# Patient Record
Sex: FEMALE | Race: WHITE | Employment: OTHER | URBAN - METROPOLITAN AREA
[De-identification: names, ages, dates, MRNs, and addresses within clinical notes are randomized per-mention and may not be internally consistent; named-entity substitution may affect disease eponyms.]

---

## 2018-09-03 ENCOUNTER — APPOINTMENT (OUTPATIENT)
Dept: GENERAL RADIOLOGY | Age: 73
End: 2018-09-03
Payer: COMMERCIAL

## 2018-09-03 ENCOUNTER — HOSPITAL ENCOUNTER (EMERGENCY)
Age: 73
Discharge: HOME | End: 2018-09-04
Attending: EMERGENCY MEDICINE
Payer: COMMERCIAL

## 2018-09-03 ENCOUNTER — APPOINTMENT (OUTPATIENT)
Dept: CT IMAGING | Age: 73
End: 2018-09-03
Payer: COMMERCIAL
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
## 2018-09-03 DIAGNOSIS — S09.90XA CLOSED HEAD INJURY, INITIAL ENCOUNTER: Primary | ICD-10-CM

## 2018-09-03 LAB
ANION GAP SERPL CALCULATED.3IONS-SCNC: 14 MMOL/L (ref 7–16)
APTT: 42.2 SEC (ref 24.5–35.1)
BASOPHILS ABSOLUTE: 0.05 E9/L (ref 0–0.2)
BASOPHILS RELATIVE PERCENT: 0.7 % (ref 0–2)
BUN BLDV-MCNC: 28 MG/DL (ref 8–23)
CALCIUM SERPL-MCNC: 9.8 MG/DL (ref 8.6–10.2)
CHLORIDE BLD-SCNC: 105 MMOL/L (ref 98–107)
CO2: 22 MMOL/L (ref 22–29)
CREAT SERPL-MCNC: 1.3 MG/DL (ref 0.5–1)
EKG ATRIAL RATE: 375 BPM
EKG Q-T INTERVAL: 420 MS
EKG QRS DURATION: 98 MS
EKG QTC CALCULATION (BAZETT): 478 MS
EKG R AXIS: -21 DEGREES
EKG T AXIS: -6 DEGREES
EKG VENTRICULAR RATE: 78 BPM
EOSINOPHILS ABSOLUTE: 0.35 E9/L (ref 0.05–0.5)
EOSINOPHILS RELATIVE PERCENT: 5.1 % (ref 0–6)
GFR AFRICAN AMERICAN: 48
GFR NON-AFRICAN AMERICAN: 40 ML/MIN/1.73
GLUCOSE BLD-MCNC: 95 MG/DL (ref 74–109)
HCT VFR BLD CALC: 38.1 % (ref 34–48)
HEMOGLOBIN: 11.7 G/DL (ref 11.5–15.5)
IMMATURE GRANULOCYTES #: 0.02 E9/L
IMMATURE GRANULOCYTES %: 0.3 % (ref 0–5)
INR BLD: 1.4
LYMPHOCYTES ABSOLUTE: 1.5 E9/L (ref 1.5–4)
LYMPHOCYTES RELATIVE PERCENT: 22 % (ref 20–42)
MCH RBC QN AUTO: 26.4 PG (ref 26–35)
MCHC RBC AUTO-ENTMCNC: 30.7 % (ref 32–34.5)
MCV RBC AUTO: 86 FL (ref 80–99.9)
MONOCYTES ABSOLUTE: 0.56 E9/L (ref 0.1–0.95)
MONOCYTES RELATIVE PERCENT: 8.2 % (ref 2–12)
NEUTROPHILS ABSOLUTE: 4.34 E9/L (ref 1.8–7.3)
NEUTROPHILS RELATIVE PERCENT: 63.7 % (ref 43–80)
PDW BLD-RTO: 15.8 FL (ref 11.5–15)
PLATELET # BLD: 197 E9/L (ref 130–450)
PMV BLD AUTO: 11.9 FL (ref 7–12)
POTASSIUM SERPL-SCNC: 4.2 MMOL/L (ref 3.5–5)
PROTHROMBIN TIME: 15.6 SEC (ref 9.3–12.4)
RBC # BLD: 4.43 E12/L (ref 3.5–5.5)
SODIUM BLD-SCNC: 141 MMOL/L (ref 132–146)
TROPONIN: <0.01 NG/ML (ref 0–0.03)
WBC # BLD: 6.8 E9/L (ref 4.5–11.5)

## 2018-09-03 PROCEDURE — 72131 CT LUMBAR SPINE W/O DYE: CPT

## 2018-09-03 PROCEDURE — 6360000002 HC RX W HCPCS: Performed by: STUDENT IN AN ORGANIZED HEALTH CARE EDUCATION/TRAINING PROGRAM

## 2018-09-03 PROCEDURE — 70450 CT HEAD/BRAIN W/O DYE: CPT

## 2018-09-03 PROCEDURE — 72125 CT NECK SPINE W/O DYE: CPT

## 2018-09-03 PROCEDURE — 85610 PROTHROMBIN TIME: CPT

## 2018-09-03 PROCEDURE — 71045 X-RAY EXAM CHEST 1 VIEW: CPT

## 2018-09-03 PROCEDURE — 85025 COMPLETE CBC W/AUTO DIFF WBC: CPT

## 2018-09-03 PROCEDURE — 80048 BASIC METABOLIC PNL TOTAL CA: CPT

## 2018-09-03 PROCEDURE — 73502 X-RAY EXAM HIP UNI 2-3 VIEWS: CPT

## 2018-09-03 PROCEDURE — 72128 CT CHEST SPINE W/O DYE: CPT

## 2018-09-03 PROCEDURE — 36415 COLL VENOUS BLD VENIPUNCTURE: CPT

## 2018-09-03 PROCEDURE — 96374 THER/PROPH/DIAG INJ IV PUSH: CPT

## 2018-09-03 PROCEDURE — 84484 ASSAY OF TROPONIN QUANT: CPT

## 2018-09-03 PROCEDURE — 99284 EMERGENCY DEPT VISIT MOD MDM: CPT

## 2018-09-03 PROCEDURE — 85730 THROMBOPLASTIN TIME PARTIAL: CPT

## 2018-09-03 RX ORDER — MORPHINE SULFATE 4 MG/ML
6 INJECTION, SOLUTION INTRAMUSCULAR; INTRAVENOUS ONCE
Status: COMPLETED | OUTPATIENT
Start: 2018-09-03 | End: 2018-09-03

## 2018-09-03 RX ADMIN — MORPHINE SULFATE 6 MG: 4 INJECTION, SOLUTION INTRAMUSCULAR; INTRAVENOUS at 23:15

## 2018-09-03 ASSESSMENT — PAIN SCALES - GENERAL
PAINLEVEL_OUTOF10: 6
PAINLEVEL_OUTOF10: 6

## 2018-09-03 ASSESSMENT — ENCOUNTER SYMPTOMS
SHORTNESS OF BREATH: 0
VOMITING: 0
SORE THROAT: 0
ABDOMINAL DISTENTION: 0
NAUSEA: 0
EYE REDNESS: 0
EYE DISCHARGE: 0
DIARRHEA: 0
COUGH: 0
WHEEZING: 0
BACK PAIN: 1
EYE PAIN: 0
SINUS PRESSURE: 0

## 2018-09-03 ASSESSMENT — PAIN DESCRIPTION - DESCRIPTORS: DESCRIPTORS: ACHING

## 2018-09-03 ASSESSMENT — PAIN DESCRIPTION - LOCATION: LOCATION: BACK;HEAD

## 2018-09-03 ASSESSMENT — PAIN DESCRIPTION - ORIENTATION: ORIENTATION: MID

## 2018-09-03 ASSESSMENT — PAIN DESCRIPTION - PAIN TYPE: TYPE: ACUTE PAIN

## 2018-09-04 ENCOUNTER — APPOINTMENT (OUTPATIENT)
Dept: CT IMAGING | Age: 73
End: 2018-09-04
Payer: COMMERCIAL

## 2018-09-04 VITALS
BODY MASS INDEX: 36.29 KG/M2 | HEIGHT: 69 IN | WEIGHT: 245 LBS | TEMPERATURE: 97.9 F | DIASTOLIC BLOOD PRESSURE: 78 MMHG | HEART RATE: 88 BPM | SYSTOLIC BLOOD PRESSURE: 140 MMHG | OXYGEN SATURATION: 97 % | RESPIRATION RATE: 19 BRPM

## 2018-09-04 PROCEDURE — 96375 TX/PRO/DX INJ NEW DRUG ADDON: CPT

## 2018-09-04 PROCEDURE — 2500000003 HC RX 250 WO HCPCS: Performed by: STUDENT IN AN ORGANIZED HEALTH CARE EDUCATION/TRAINING PROGRAM

## 2018-09-04 PROCEDURE — 70450 CT HEAD/BRAIN W/O DYE: CPT

## 2018-09-04 RX ORDER — KETAMINE HYDROCHLORIDE 10 MG/ML
20 INJECTION, SOLUTION INTRAMUSCULAR; INTRAVENOUS ONCE
Status: COMPLETED | OUTPATIENT
Start: 2018-09-04 | End: 2018-09-04

## 2018-09-04 RX ORDER — FENTANYL CITRATE 50 UG/ML
50 INJECTION, SOLUTION INTRAMUSCULAR; INTRAVENOUS ONCE
Status: DISCONTINUED | OUTPATIENT
Start: 2018-09-04 | End: 2018-09-04

## 2018-09-04 RX ADMIN — KETAMINE HYDROCHLORIDE 20 MG: 10 INJECTION, SOLUTION INTRAMUSCULAR; INTRAVENOUS at 01:01

## 2018-09-04 ASSESSMENT — PAIN SCALES - GENERAL
PAINLEVEL_OUTOF10: 6
PAINLEVEL_OUTOF10: 6

## 2018-09-04 NOTE — ED PROVIDER NOTES
and are negative. Physical Exam   Constitutional: She is oriented to person, place, and time. Alert and oriented ×3. No shortness of breath. No acute distress. HENT:   Head: Not macrocephalic and not microcephalic. Head is without raccoon's eyes, without Reddy's sign, without abrasion, without contusion, without laceration, without right periorbital erythema and without left periorbital erythema. Hair is normal.   Mouth/Throat: Oropharynx is clear and moist. No oropharyngeal exudate, posterior oropharyngeal edema or posterior oropharyngeal erythema. Eyes: Pupils are equal, round, and reactive to light. Conjunctivae and EOM are normal.   Neck: Spinous process tenderness and muscular tenderness present. No neck rigidity. No edema, no erythema and normal range of motion present. Cervical collar applied. Cardiovascular: Normal rate, regular rhythm, normal heart sounds and intact distal pulses. Exam reveals no friction rub. No murmur heard. Pulmonary/Chest: Effort normal and breath sounds normal. No respiratory distress. She has no wheezes. She has no rales. She exhibits no tenderness. Abdominal: Soft. She exhibits no distension. There is no tenderness. There is no rebound and no guarding. Musculoskeletal:        Left hip: She exhibits decreased range of motion, decreased strength, tenderness and bony tenderness. She exhibits no swelling, no crepitus, no deformity and no laceration. Thoracic back: She exhibits decreased range of motion, tenderness and bony tenderness. She exhibits no swelling, no edema, no deformity, no laceration, no pain, no spasm and normal pulse. Lumbar back: She exhibits decreased range of motion, tenderness and bony tenderness. She exhibits no swelling, no edema, no deformity, no laceration, no pain, no spasm and normal pulse. Left forearm: She exhibits swelling. She exhibits no tenderness, no bony tenderness, no edema, no deformity and no laceration. mg/dL    BUN 28 (H) 8 - 23 mg/dL    CREATININE 1.3 (H) 0.5 - 1.0 mg/dL    GFR Non-African American 40 >=60 mL/min/1.73    GFR African American 48     Calcium 9.8 8.6 - 10.2 mg/dL   Troponin   Result Value Ref Range    Troponin <0.01 0.00 - 0.03 ng/mL   Protime-INR   Result Value Ref Range    Protime 15.6 (H) 9.3 - 12.4 sec    INR 1.4    APTT   Result Value Ref Range    aPTT 42.2 (H) 24.5 - 35.1 sec   EKG 12 Lead   Result Value Ref Range    Ventricular Rate 78 BPM    Atrial Rate 375 BPM    QRS Duration 98 ms    Q-T Interval 420 ms    QTc Calculation (Bazett) 478 ms    R Axis -21 degrees    T Axis -6 degrees       Radiology:  CT Head WO Contrast   Final Result     Normal head/brain CT. Stable head CT compared with September 3, 2018. This report has been electronically signed by Zoila Santos MD.      CT Lumbar Spine WO Contrast   Final Result     No acute findings. This report has been electronically signed by Zoila Santos MD.      CT Thoracic Spine WO Contrast   Final Result     No acute findings. This report has been electronically signed by Zoila Santos MD.      CT Cervical Spine WO Contrast   Final Result     No acute findings. This report has been electronically signed by Zoila Santos MD.      CT Head WO Contrast   Final Result     No acute intracranial findings. This report has been electronically signed by Zoila Santos MD.      XR CHEST 1 VW   Final Result   No acute cardiopulmonary process. XR HIP LEFT (2-3 VIEWS)   Final Result   No acute fractures or dislocations in the left hip.          ------------------------- NURSING NOTES AND VITALS REVIEWED ---------------------------  Date / Time Roomed:  9/3/2018  9:26 PM  ED Bed Assignment:  18B/18B-18    The nursing notes within the ED encounter and vital signs as below have been reviewed.    BP (!) 173/89   Pulse 81   Temp 97.9 °F (36.6 °C) (Temporal)   Resp 19   Ht 5' 9\" (1.753 m)   Wt 245 lb (111.1 kg)   SpO2

## 2018-09-08 ENCOUNTER — APPOINTMENT (OUTPATIENT)
Dept: GENERAL RADIOLOGY | Age: 73
End: 2018-09-08
Payer: COMMERCIAL

## 2018-09-08 ENCOUNTER — HOSPITAL ENCOUNTER (EMERGENCY)
Age: 73
Discharge: HOME | End: 2018-09-08
Payer: COMMERCIAL

## 2018-09-08 VITALS
HEIGHT: 69 IN | HEART RATE: 78 BPM | OXYGEN SATURATION: 98 % | BODY MASS INDEX: 37.03 KG/M2 | DIASTOLIC BLOOD PRESSURE: 69 MMHG | WEIGHT: 250 LBS | TEMPERATURE: 98 F | SYSTOLIC BLOOD PRESSURE: 146 MMHG | RESPIRATION RATE: 20 BRPM

## 2018-09-08 DIAGNOSIS — T07.XXXA MULTIPLE CONTUSIONS: Primary | ICD-10-CM
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
## 2018-09-08 PROCEDURE — 73060 X-RAY EXAM OF HUMERUS: CPT

## 2018-09-08 PROCEDURE — 99284 EMERGENCY DEPT VISIT MOD MDM: CPT

## 2018-09-08 PROCEDURE — 73562 X-RAY EXAM OF KNEE 3: CPT

## 2018-09-08 PROCEDURE — 73070 X-RAY EXAM OF ELBOW: CPT

## 2018-09-08 ASSESSMENT — PAIN SCALES - GENERAL: PAINLEVEL_OUTOF10: 5

## 2018-09-08 ASSESSMENT — PAIN DESCRIPTION - LOCATION: LOCATION: ARM;KNEE

## 2018-09-08 ASSESSMENT — PAIN DESCRIPTION - PAIN TYPE: TYPE: ACUTE PAIN

## 2018-09-08 NOTE — ED PROVIDER NOTES
1. Multiple contusions    2. Fall, initial encounter      Plan   Discharge to home  Patient condition is good    New Medications     Discharge Medication List as of 9/8/2018  6:41 PM        Electronically signed by SONIA Duncan CNP   DD: 9/8/18  **This report was transcribed using voice recognition software. Every effort was made to ensure accuracy; however, inadvertent computerized transcription errors may be present.   END OF ED PROVIDER NOTE       SOINA Macias CNP  09/08/18 [id number]

## 2019-01-02 ENCOUNTER — APPOINTMENT (OUTPATIENT)
Dept: GENERAL RADIOLOGY | Age: 74
End: 2019-01-02
Payer: COMMERCIAL

## 2019-01-02 ENCOUNTER — HOSPITAL ENCOUNTER (EMERGENCY)
Age: 74
Discharge: HOME | End: 2019-01-02
Payer: COMMERCIAL

## 2019-01-02 ENCOUNTER — APPOINTMENT (OUTPATIENT)
Dept: CT IMAGING | Age: 74
End: 2019-01-02
Payer: COMMERCIAL

## 2019-01-02 VITALS
RESPIRATION RATE: 16 BRPM | DIASTOLIC BLOOD PRESSURE: 64 MMHG | BODY MASS INDEX: 37.03 KG/M2 | SYSTOLIC BLOOD PRESSURE: 118 MMHG | HEART RATE: 68 BPM | WEIGHT: 250 LBS | HEIGHT: 69 IN | TEMPERATURE: 97.6 F | OXYGEN SATURATION: 100 %

## 2019-01-02 DIAGNOSIS — T07.XXXA MULTIPLE CONTUSIONS: ICD-10-CM

## 2019-01-02 DIAGNOSIS — S09.90XA INJURY OF HEAD, INITIAL ENCOUNTER: Primary | ICD-10-CM
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
## 2019-01-02 LAB
ALBUMIN SERPL-MCNC: 4.1 G/DL (ref 3.5–5.2)
ALP BLD-CCNC: 117 U/L (ref 35–104)
ALT SERPL-CCNC: 12 U/L (ref 0–32)
ANION GAP SERPL CALCULATED.3IONS-SCNC: 13 MMOL/L (ref 7–16)
AST SERPL-CCNC: 24 U/L (ref 0–31)
BACTERIA: ABNORMAL /HPF
BASOPHILS ABSOLUTE: 0.04 E9/L (ref 0–0.2)
BASOPHILS RELATIVE PERCENT: 0.5 % (ref 0–2)
BILIRUB SERPL-MCNC: 0.4 MG/DL (ref 0–1.2)
BILIRUBIN URINE: NEGATIVE
BLOOD, URINE: NEGATIVE
BUN BLDV-MCNC: 28 MG/DL (ref 8–23)
CALCIUM SERPL-MCNC: 9.4 MG/DL (ref 8.6–10.2)
CHLORIDE BLD-SCNC: 106 MMOL/L (ref 98–107)
CLARITY: CLEAR
CO2: 22 MMOL/L (ref 22–29)
CO2: 23 MMOL/L (ref 22–29)
COLOR: YELLOW
CREAT SERPL-MCNC: 1.3 MG/DL (ref 0.5–1)
EOSINOPHILS ABSOLUTE: 0.21 E9/L (ref 0.05–0.5)
EOSINOPHILS RELATIVE PERCENT: 2.9 % (ref 0–6)
EPITHELIAL CELLS, UA: ABNORMAL /HPF
GFR AFRICAN AMERICAN: 48
GFR AFRICAN AMERICAN: 48
GFR NON-AFRICAN AMERICAN: 40 ML/MIN/1.73
GFR NON-AFRICAN AMERICAN: 40 ML/MIN/1.73
GLUCOSE BLD-MCNC: 89 MG/DL (ref 74–99)
GLUCOSE BLD-MCNC: 91 MG/DL (ref 74–99)
GLUCOSE URINE: NEGATIVE MG/DL
HCT VFR BLD CALC: 37.8 % (ref 34–48)
HEMOGLOBIN: 11.8 G/DL (ref 11.5–15.5)
IMMATURE GRANULOCYTES #: 0.02 E9/L
IMMATURE GRANULOCYTES %: 0.3 % (ref 0–5)
KETONES, URINE: NEGATIVE MG/DL
LACTIC ACID: 1.1 MMOL/L (ref 0.5–2.2)
LEUKOCYTE ESTERASE, URINE: ABNORMAL
LYMPHOCYTES ABSOLUTE: 0.7 E9/L (ref 1.5–4)
LYMPHOCYTES RELATIVE PERCENT: 9.6 % (ref 20–42)
MCH RBC QN AUTO: 26.9 PG (ref 26–35)
MCHC RBC AUTO-ENTMCNC: 31.2 % (ref 32–34.5)
MCV RBC AUTO: 86.1 FL (ref 80–99.9)
MONOCYTES ABSOLUTE: 0.55 E9/L (ref 0.1–0.95)
MONOCYTES RELATIVE PERCENT: 7.5 % (ref 2–12)
NEUTROPHILS ABSOLUTE: 5.8 E9/L (ref 1.8–7.3)
NEUTROPHILS RELATIVE PERCENT: 79.2 % (ref 43–80)
NITRITE, URINE: NEGATIVE
PDW BLD-RTO: 14.3 FL (ref 11.5–15)
PH UA: 5.5 (ref 5–9)
PLATELET # BLD: 192 E9/L (ref 130–450)
PMV BLD AUTO: 11.3 FL (ref 7–12)
POC ANION GAP: 12 MMOL/L (ref 7–16)
POC BUN: 28 MG/DL (ref 8–23)
POC CHLORIDE: 108 MMOL/L (ref 100–108)
POC CREATININE: 1.3 MG/DL (ref 0.5–1)
POC POTASSIUM: 3.6 MMOL/L (ref 3.5–5)
POC SODIUM: 143 MMOL/L (ref 132–146)
POTASSIUM SERPL-SCNC: 3.8 MMOL/L (ref 3.5–5)
PROTEIN UA: ABNORMAL MG/DL
RBC # BLD: 4.39 E12/L (ref 3.5–5.5)
RBC UA: ABNORMAL /HPF (ref 0–2)
SODIUM BLD-SCNC: 141 MMOL/L (ref 132–146)
SPECIFIC GRAVITY UA: >=1.03 (ref 1–1.03)
TOTAL PROTEIN: 7.8 G/DL (ref 6.4–8.3)
UROBILINOGEN, URINE: 0.2 E.U./DL
WBC # BLD: 7.3 E9/L (ref 4.5–11.5)
WBC UA: ABNORMAL /HPF (ref 0–5)

## 2019-01-02 PROCEDURE — 6370000000 HC RX 637 (ALT 250 FOR IP): Performed by: PHYSICIAN ASSISTANT

## 2019-01-02 PROCEDURE — 99284 EMERGENCY DEPT VISIT MOD MDM: CPT

## 2019-01-02 PROCEDURE — 36415 COLL VENOUS BLD VENIPUNCTURE: CPT

## 2019-01-02 PROCEDURE — 74176 CT ABD & PELVIS W/O CONTRAST: CPT

## 2019-01-02 PROCEDURE — 84520 ASSAY OF UREA NITROGEN: CPT

## 2019-01-02 PROCEDURE — 70450 CT HEAD/BRAIN W/O DYE: CPT

## 2019-01-02 PROCEDURE — 73502 X-RAY EXAM HIP UNI 2-3 VIEWS: CPT

## 2019-01-02 PROCEDURE — 81001 URINALYSIS AUTO W/SCOPE: CPT

## 2019-01-02 PROCEDURE — 73030 X-RAY EXAM OF SHOULDER: CPT

## 2019-01-02 PROCEDURE — 80053 COMPREHEN METABOLIC PANEL: CPT

## 2019-01-02 PROCEDURE — 82947 ASSAY GLUCOSE BLOOD QUANT: CPT

## 2019-01-02 PROCEDURE — 73070 X-RAY EXAM OF ELBOW: CPT

## 2019-01-02 PROCEDURE — 85025 COMPLETE CBC W/AUTO DIFF WBC: CPT

## 2019-01-02 PROCEDURE — 72125 CT NECK SPINE W/O DYE: CPT

## 2019-01-02 PROCEDURE — 82565 ASSAY OF CREATININE: CPT

## 2019-01-02 PROCEDURE — 80051 ELECTROLYTE PANEL: CPT

## 2019-01-02 PROCEDURE — 83605 ASSAY OF LACTIC ACID: CPT

## 2019-01-02 RX ORDER — TRAMADOL HYDROCHLORIDE 50 MG/1
50 TABLET ORAL ONCE
Status: COMPLETED | OUTPATIENT
Start: 2019-01-02 | End: 2019-01-02

## 2019-01-02 RX ADMIN — TRAMADOL HYDROCHLORIDE 50 MG: 50 TABLET, FILM COATED ORAL at 16:39

## 2019-01-02 ASSESSMENT — PAIN SCALES - GENERAL
PAINLEVEL_OUTOF10: 8
PAINLEVEL_OUTOF10: 8

## 2019-01-02 ASSESSMENT — PAIN DESCRIPTION - ORIENTATION: ORIENTATION: LEFT

## 2019-01-02 ASSESSMENT — PAIN DESCRIPTION - LOCATION: LOCATION: SHOULDER;HIP

## 2019-01-02 ASSESSMENT — PAIN DESCRIPTION - PAIN TYPE: TYPE: ACUTE PAIN

## 2019-05-06 ENCOUNTER — HOSPITAL ENCOUNTER (OUTPATIENT)
Age: 74
Discharge: HOME | End: 2019-05-06
Payer: COMMERCIAL

## 2019-05-06 LAB
ALBUMIN SERPL-MCNC: 3.9 G/DL (ref 3.5–5.2)
ALP BLD-CCNC: 121 U/L (ref 35–104)
ALT SERPL-CCNC: 10 U/L (ref 0–32)
ANION GAP SERPL CALCULATED.3IONS-SCNC: 9 MMOL/L (ref 7–16)
AST SERPL-CCNC: 18 U/L (ref 0–31)
BILIRUB SERPL-MCNC: 0.4 MG/DL (ref 0–1.2)
BUN BLDV-MCNC: 19 MG/DL (ref 8–23)
CALCIUM SERPL-MCNC: 9.2 MG/DL (ref 8.6–10.2)
CHLORIDE BLD-SCNC: 107 MMOL/L (ref 98–107)
CO2: 27 MMOL/L (ref 22–29)
CREAT SERPL-MCNC: 1.2 MG/DL (ref 0.5–1)
CREATININE URINE: 122 MG/DL (ref 29–226)
GFR AFRICAN AMERICAN: 53
GFR NON-AFRICAN AMERICAN: 44 ML/MIN/1.73
GLUCOSE BLD-MCNC: 102 MG/DL (ref 74–99)
HCT VFR BLD CALC: 36.4 % (ref 34–48)
HEMOGLOBIN: 10.9 G/DL (ref 11.5–15.5)
MCH RBC QN AUTO: 26.5 PG (ref 26–35)
MCHC RBC AUTO-ENTMCNC: 29.9 % (ref 32–34.5)
MCV RBC AUTO: 88.3 FL (ref 80–99.9)
PARATHYROID HORMONE INTACT: 94 PG/ML (ref 15–65)
PDW BLD-RTO: 15.1 FL (ref 11.5–15)
PHOSPHORUS: 2.9 MG/DL (ref 2.5–4.5)
PLATELET # BLD: 185 E9/L (ref 130–450)
PMV BLD AUTO: 11.5 FL (ref 7–12)
POTASSIUM SERPL-SCNC: 4.4 MMOL/L (ref 3.5–5)
PROTEIN PROTEIN: 33 MG/DL (ref 0–12)
PROTEIN/CREAT RATIO: 0.3
PROTEIN/CREAT RATIO: 0.3 (ref 0–0.2)
RBC # BLD: 4.12 E12/L (ref 3.5–5.5)
SODIUM BLD-SCNC: 143 MMOL/L (ref 132–146)
TOTAL PROTEIN: 6.9 G/DL (ref 6.4–8.3)
VITAMIN D 25-HYDROXY: 25 NG/ML (ref 30–100)
WBC # BLD: 4.8 E9/L (ref 4.5–11.5)

## 2019-05-06 PROCEDURE — 85027 COMPLETE CBC AUTOMATED: CPT

## 2019-05-06 PROCEDURE — 84100 ASSAY OF PHOSPHORUS: CPT

## 2019-05-06 PROCEDURE — 83970 ASSAY OF PARATHORMONE: CPT

## 2019-05-06 PROCEDURE — 82306 VITAMIN D 25 HYDROXY: CPT

## 2019-05-06 PROCEDURE — 82570 ASSAY OF URINE CREATININE: CPT

## 2019-05-06 PROCEDURE — 80053 COMPREHEN METABOLIC PANEL: CPT

## 2019-05-06 PROCEDURE — 36415 COLL VENOUS BLD VENIPUNCTURE: CPT

## 2019-05-06 PROCEDURE — 84156 ASSAY OF PROTEIN URINE: CPT

## 2020-07-01 ENCOUNTER — HOSPITAL ENCOUNTER (OUTPATIENT)
Age: 75
Discharge: HOME | End: 2020-07-03
Payer: COMMERCIAL

## 2020-07-01 ENCOUNTER — HOSPITAL ENCOUNTER (OUTPATIENT)
Dept: CT IMAGING | Age: 75
Discharge: HOME | End: 2020-07-01
Payer: COMMERCIAL

## 2020-07-01 ENCOUNTER — HOSPITAL ENCOUNTER (OUTPATIENT)
Dept: GENERAL RADIOLOGY | Age: 75
Discharge: HOME | End: 2020-07-03
Payer: COMMERCIAL

## 2020-07-01 PROCEDURE — 70150 X-RAY EXAM OF FACIAL BONES: CPT

## 2020-07-01 PROCEDURE — 72050 X-RAY EXAM NECK SPINE 4/5VWS: CPT

## 2020-07-01 PROCEDURE — 70450 CT HEAD/BRAIN W/O DYE: CPT

## 2020-07-07 ENCOUNTER — HOSPITAL ENCOUNTER (EMERGENCY)
Age: 75
Discharge: HOME | End: 2020-07-07
Attending: STUDENT IN AN ORGANIZED HEALTH CARE EDUCATION/TRAINING PROGRAM
Payer: COMMERCIAL

## 2020-07-07 ENCOUNTER — APPOINTMENT (OUTPATIENT)
Dept: CT IMAGING | Age: 75
End: 2020-07-07
Payer: COMMERCIAL

## 2020-07-07 VITALS
SYSTOLIC BLOOD PRESSURE: 151 MMHG | RESPIRATION RATE: 16 BRPM | DIASTOLIC BLOOD PRESSURE: 98 MMHG | TEMPERATURE: 98.2 F | HEART RATE: 86 BPM | OXYGEN SATURATION: 94 %

## 2020-07-07 PROCEDURE — 99284 EMERGENCY DEPT VISIT MOD MDM: CPT

## 2020-07-07 PROCEDURE — 70450 CT HEAD/BRAIN W/O DYE: CPT

## 2020-07-07 PROCEDURE — 72125 CT NECK SPINE W/O DYE: CPT

## 2020-07-07 NOTE — ED PROVIDER NOTES
HPI:  7/7/20, Time: 3:35 PM EDT         Demi Bocanegra is a 67 y.o. female presenting to the ED for fall, beginning earlier today. Patient states that she was just walking in her home and she had a mechanical fall. Hit the left posterior portion of her head. She has a bump in this area. No loss of consciousness. Patient states that this is her second fall in a week. She was assessed earlier this week by her primary care physician who ordered imaging. She had no acute pathology at that time. Patient states that she is typically unsteady on her feet. States that she is on Xarelto for chronic A. fib. Denies any dizziness or lightheadedness or chest pain, shortness of breath or anything prior to this episode. States that this is a mechanical fall. Discussed possibility of rehab with patient. Patient denies any other injuries or any other symptoms. Patient denies fever/chills, cough, congestion, chest pain, shortness of breath, edema, headache, visual disturbances, focal paresthesias, focal weakness, abdominal pain, nausea, vomiting, diarrhea, constipation, dysuria, hematuria, neck or back pain or other complaints. ROS:   Pertinent positives and negatives are stated within HPI, all other systems reviewed and are negative.      --------------------------------------------- PAST HISTORY ---------------------------------------------  Past Medical History:  has a past medical history of Arthritis, Atrial fibrillation ([facility] [address] GERD (gastroesophageal reflux disease), History of blood transfusion, MI, old, Renal insufficiency, and Thyroid disease. Past Surgical History:  has a past surgical history that includes pacemaker placement; joint replacement; fracture surgery; Abdomen surgery; Gastric bypass surgery; Cholecystectomy; and Appendectomy. Social History:  reports that she has quit smoking. She has never used smokeless tobacco. She reports current alcohol use.  She reports that she does not use drugs. Family History: family history is not on file. The patients home medications have been reviewed. Allergies: Hydromorphone; Ciprofloxacin; Codeine; Dilaudid [hydromorphone hcl]; Hydrocodone-acetaminophen; Percodan [oxycodone-aspirin]; and Vicodin [hydrocodone-acetaminophen]        ---------------------------------------------------PHYSICAL EXAM--------------------------------------    Constitutional:  Well developed, well nourished, no acute distress, non-toxic appearance   Eyes:  PERRL, conjunctiva normal, EOMI  HENT: Swelling to posterior left side of head. Consistent with posterior parietal versus occipital region. No midline cervical tenderness. No reduction in range of motion. External ears normal, nose normal, oropharynx moist. Neck- normal range of motion, no tenderness, supple   Respiratory:  No respiratory distress, normal breath sounds, no rales, no wheezing   Cardiovascular:  Normal rate, normal rhythm, no murmurs, no gallops, no rubs. Radial and DP pulses 2+ bilaterally. GI:  Soft, nondistended, normal bowel sounds, nontender, no organomegaly, no mass, no rebound, no guarding   :  No costovertebral angle tenderness   Musculoskeletal: Multiple healing superficial abrasions on left elbow and right arm. Bandages in place. No tenderness. No reduction in range of motion. Integument:  Well hydrated, no rash. Adequate perfusion. Lymphatic:  No lymphadenopathy noted   Neurologic:  Alert & oriented x 3, CN 2-12 normal, normal motor function, normal sensory function, no focal deficits noted. Psychiatric:  Speech and behavior appropriate       -------------------------------------------------- RESULTS -------------------------------------------------  I have personally reviewed all laboratory and imaging results for this patient. Results are listed below. LABS:  No results found for this visit on 07/07/20.     RADIOLOGY:  Interpreted by Radiologist.  Ziyad Neumann Result   No acute intracranial abnormality. Specifically, there is no acute   intracranial hemorrhage. Atrophy and periventricular leukomalacia         CT Cervical Spine WO Contrast   Final Result   There is no acute fracture or subluxation of the cervical spine. Spondylosis               ------------------------- NURSING NOTES AND VITALS REVIEWED ---------------------------   The nursing notes within the ED encounter and vital signs as below have been reviewed by myself. BP (!) 151/98   Pulse 86   Temp 98.2 °F (36.8 °C) (Tympanic)   Resp 16   SpO2 94%   Oxygen Saturation Interpretation: Normal    The patients available past medical records and past encounters were reviewed. ------------------------------ ED COURSE/MEDICAL DECISION MAKING----------------------  Medications - No data to display    Medical Decision Making:    Discussed imaging results with patient. Patient's imaging is negative. This is fall occurred at about noon today. By the time the imaging was done she had multiple hours of observation with no symptoms. Denied any headache or visual changes. Imaging was negative for acute pathology. Patient is able to ambulate without any concern. Recommend that she discuss her fall concerns with her primary care provider and to return emergency department symptoms worsen. Patient agreed with this plan was discharged home. This patient's ED course included: a personal history and physicial examination, re-evaluation prior to disposition and a personal history and physicial eaxmination    This patient has remained hemodynamically stable during their ED course. Counseling: The emergency provider has spoken with the patient and discussed todays results, in addition to providing specific details for the plan of care and counseling regarding the diagnosis and prognosis. Questions are answered at this time and they are agreeable with the plan. --------------------------------- IMPRESSION AND DISPOSITION ---------------------------------    IMPRESSION  1. Injury of head, initial encounter    2.  Fall, initial encounter        DISPOSITION  Disposition: Discharge to home  Patient condition is stable                 Diana Chance DO  07/07/20 [id number]

## 2020-12-03 ENCOUNTER — OFFICE VISIT (OUTPATIENT)
Dept: PRIMARY CARE CLINIC | Age: 75
End: 2020-12-03
Payer: COMMERCIAL

## 2020-12-03 VITALS
OXYGEN SATURATION: 96 % | TEMPERATURE: 97.3 F | RESPIRATION RATE: 16 BRPM | BODY MASS INDEX: 39.25 KG/M2 | SYSTOLIC BLOOD PRESSURE: 122 MMHG | WEIGHT: 265 LBS | HEIGHT: 69 IN | DIASTOLIC BLOOD PRESSURE: 86 MMHG | HEART RATE: 68 BPM

## 2020-12-03 DIAGNOSIS — Z20.822 EXPOSURE TO COVID-19 VIRUS: ICD-10-CM

## 2020-12-03 PROCEDURE — G8417 CALC BMI ABV UP PARAM F/U: HCPCS | Performed by: PHYSICIAN ASSISTANT

## 2020-12-03 PROCEDURE — 1123F ACP DISCUSS/DSCN MKR DOCD: CPT | Performed by: PHYSICIAN ASSISTANT

## 2020-12-03 PROCEDURE — G8400 PT W/DXA NO RESULTS DOC: HCPCS | Performed by: PHYSICIAN ASSISTANT

## 2020-12-03 PROCEDURE — 99213 OFFICE O/P EST LOW 20 MIN: CPT | Performed by: PHYSICIAN ASSISTANT

## 2020-12-03 PROCEDURE — 1090F PRES/ABSN URINE INCON ASSESS: CPT | Performed by: PHYSICIAN ASSISTANT

## 2020-12-03 PROCEDURE — G8427 DOCREV CUR MEDS BY ELIG CLIN: HCPCS | Performed by: PHYSICIAN ASSISTANT

## 2020-12-03 PROCEDURE — G8484 FLU IMMUNIZE NO ADMIN: HCPCS | Performed by: PHYSICIAN ASSISTANT

## 2020-12-03 PROCEDURE — 1036F TOBACCO NON-USER: CPT | Performed by: PHYSICIAN ASSISTANT

## 2020-12-03 PROCEDURE — 4040F PNEUMOC VAC/ADMIN/RCVD: CPT | Performed by: PHYSICIAN ASSISTANT

## 2020-12-03 RX ORDER — CEPHALEXIN 250 MG/1
250 CAPSULE ORAL DAILY
COMMUNITY

## 2020-12-03 NOTE — PROGRESS NOTES
Chief Complaint   Headache (cough, scratchy throat, began 2 days ago fatigue, pos exposure)      History of Present Illness   Source of history provided by: patient. Vania Acosta is a 67 y.o. old female who has a past medical history of:   Past Medical History:   Diagnosis Date    Arthritis     Atrial fibrillation ([address]     GERD (gastroesophageal reflux disease)     History of blood transfusion     MI, old     Renal insufficiency     Thyroid disease     Presents to the flu clinic for evaluation of mild cough, fatigue and scratchy throat. She was exposed to a friend recently who a few days after told her they tested positive for COVID. She has had no known fever or chills. She always has soreness or pain , but she wanted to get checked just to be sure. She denies any CP, dyspnea, LE edema, abdominal pain, vomiting or rash. No history of international travel in the past 14 days. She has had contact with individuals with known COVID-19 infection or under investigation for COVID-19 infection. Denies any hx of asthma or COPD. No hx of tobacco use. ROS   Pertinent positives and negatives are stated within HPI, all other systems reviewed and are negative. Surgical History:  has a past surgical history that includes pacemaker placement; joint replacement; fracture surgery; Abdomen surgery; Gastric bypass surgery; Cholecystectomy; and Appendectomy. Social History:  reports that she has quit smoking. She has never used smokeless tobacco. She reports current alcohol use. She reports that she does not use drugs. Family History: family history is not on file. Allergies: Hydromorphone; Ciprofloxacin; Codeine; Dilaudid [hydromorphone hcl];  Hydrocodone-acetaminophen; Percodan [oxycodone-aspirin]; and Vicodin [hydrocodone-acetaminophen]    Physical Exam      VS:  /86 (Site: Left Upper Arm, Position: Standing, Cuff Size: Large Adult)   Pulse 68   Temp 97.3 °F (36.3 °C) (Temporal)   Resp 16   Ht 5' 9\" (1.753 m)   Wt 265 lb (120.2 kg)   SpO2 96%   Breastfeeding No   BMI 39.13 kg/m²    Oxygen Saturation Interpretation: Normal.    Constitutional:  Alert, development consistent with age. NAD. Non toxic in appearance. Head:  NC/NT. Airway patent. Ears: TMs normal bilaterally. Canals without exudate or swelling bilaterally. Mouth: Posterior pharynx with mild erythema and clear postnasal drip. No tonsillar hypertrophy or exudate. Mucosa moist  Neck:  Normal ROM. Supple. Some mild tenderness to  anterior cervical adenopathy noted. Lungs: CTAB without wheezes, rales, or rhonchi. CV:  Irregular rhythm(history of afib)  Skin:  Normal turgor. Warm, dry, without visible rash. Lymphatic: No lymphangitis or adenopathy noted. Neurological:  Oriented. Motor functions intact. Lab / Imaging Results   (All laboratory and radiology results have been personally reviewed by myself)  Labs:  No results found for this visit on 12/03/20. Imaging: All Radiology results interpreted by Radiologist unless otherwise noted. No results found. Medical Decision Making   Pt non-toxic, in no apparent distress and stable at time of discharge. Assessment/Plan   Cecilia Edwards was seen today for headache. Diagnoses and all orders for this visit:    Exposure to COVID-19 virus  -     COVID-19 Ambulatory; Future    Cough    Fatigue, unspecified type    Throat symptom        COVID-19 swab obtained and pending, will call with results once available. Advised cautionary self-quarantine at home in the interim. Increase fluids and rest. Symptomatic relief discussed including Tylenol prn pain/fever. Schedule virtual f/u with PCP in 7-10 days if symptoms persist. ED sooner if symptoms worsen or change. ED immediately with high or refractory fever, progressive SOB, dyspnea, CP, calf pain/swelling, shaking chills, vomiting, abdominal pain, lethargy, flank pain, or decreased urinary output.  Pt verbalizes understanding and is in agreement with plan of care. All questions answered. Ree Harry PA-C    This visit was provided as a focused evaluation during the COVID -19 pandemic/national emergency. A comprehensive review of all previous patient history and testing was not conducted. Pertinent findings were elicited during the visit.

## 2020-12-04 NOTE — PATIENT INSTRUCTIONS
Patient Education        Cough: Care Instructions  Your Care Instructions     A cough is your body's response to something that bothers your throat or airways. Many things can cause a cough. You might cough because of a cold or the flu, bronchitis, or asthma. Smoking, postnasal drip, allergies, and stomach acid that backs up into your throat also can cause coughs. A cough is a symptom, not a disease. Most coughs stop when the cause, such as a cold, goes away. You can take a few steps at home to cough less and feel better. Follow-up care is a key part of your treatment and safety. Be sure to make and go to all appointments, and call your doctor if you are having problems. It's also a good idea to know your test results and keep a list of the medicines you take. How can you care for yourself at home? · Drink lots of water and other fluids. This helps thin the mucus and soothes a dry or sore throat. Honey or lemon juice in hot water or tea may ease a dry cough. · Take cough medicine as directed by your doctor. · Prop up your head on pillows to help you breathe and ease a dry cough. · Try cough drops to soothe a dry or sore throat. Cough drops don't stop a cough. Medicine-flavored cough drops are no better than candy-flavored drops or hard candy. · Do not smoke. Avoid secondhand smoke. If you need help quitting, talk to your doctor about stop-smoking programs and medicines. These can increase your chances of quitting for good. When should you call for help? Call 911 anytime you think you may need emergency care. For example, call if:    · You have severe trouble breathing. Call your doctor now or seek immediate medical care if:    · You cough up blood.     · You have new or worse trouble breathing.     · You have a new or higher fever.     · You have a new rash.    Watch closely for changes in your health, and be sure to contact your doctor if:    · You cough more deeply or more often, especially if you notice more mucus or a change in the color of your mucus.     · You have new symptoms, such as a sore throat, an earache, or sinus pain.     · You do not get better as expected. Where can you learn more? Go to https://chpezeus.[employer]. org and sign in to your [employer] account. Enter D279 in the [facility] box to learn more about \"Cough: Care Instructions. \"     If you do not have an account, please click on the \"Sign Up Now\" link. Current as of: February 24, 2020               Content Version: 12.6  © 8026-1263 [employer]. Care instructions adapted under license by [address] If you have questions about a medical condition or this instruction, always ask your healthcare professional. [address] any warranty or liability for your use of this information. Patient Education        Sore Throat: Care Instructions  Your Care Instructions     Infection by bacteria or a virus causes most sore throats. Cigarette smoke, dry air, air pollution, allergies, and yelling can also cause a sore throat. Sore throats can be painful and annoying. Fortunately, most sore throats go away on their own. If you have a bacterial infection, your doctor may prescribe antibiotics. Follow-up care is a key part of your treatment and safety. Be sure to make and go to all appointments, and call your doctor if you are having problems. It's also a good idea to know your test results and keep a list of the medicines you take. How can you care for yourself at home? · If your doctor prescribed antibiotics, take them as directed. Do not stop taking them just because you feel better. You need to take the full course of antibiotics. · Gargle with warm salt water once an hour to help reduce swelling and relieve discomfort. Use 1 teaspoon of salt mixed in 1 cup of warm water.   · Take an over-the-counter pain medicine, such as acetaminophen (Tylenol), ibuprofen (Advil, Motrin), information. Patient Education        10 Things to Do When You Have COVID-19    Stay home. Don't go to school, work, or public areas. And don't use public transportation, ride-shares, or taxis unless you have no choice. Leave your home only if you need to get medical care. But call the doctor's office first so they know you're coming. And wear a cloth face cover. Ask before leaving isolation. Talk with your doctor or other health professional about when it will be safe for you to leave isolation. Wear a cloth face cover when you are around other people. It can help stop the spread of the virus when you cough or sneeze. Limit contact with people in your home. If possible, stay in a separate bedroom and use a separate bathroom. Avoid contact with pets and other animals. If possible, have a friend or family member care for them while you're sick. Cover your mouth and nose with a tissue when you cough or sneeze. Then throw the tissue in the trash right away. Wash your hands often, especially after you cough or sneeze. Use soap and water, and scrub for at least 20 seconds. If soap and water aren't available, use an alcohol-based hand . Don't share personal household items. These include bedding, towels, cups and glasses, and eating utensils. Clean and disinfect your home every day. Use household  or disinfectant wipes or sprays. Take special care to clean things that you grab with your hands. These include doorknobs, remote controls, phones, and handles on your refrigerator and microwave. And don't forget countertops, tabletops, bathrooms, and computer keyboards. Take acetaminophen (Tylenol) to relieve fever and body aches. Read and follow all instructions on the label. Current as of: July 10, 2020               Content Version: 12.6  © 2426-4189 [employer]. Care instructions adapted under license by [facility] ([facility]).  If you have questions about a medical condition or this instruction, always ask your healthcare professional. [place] [address] any warranty or liability for your use of this information.

## 2020-12-05 LAB
SARS-COV-2: NOT DETECTED
SOURCE: NORMAL

## 2021-01-02 ENCOUNTER — APPOINTMENT (OUTPATIENT)
Dept: GENERAL RADIOLOGY | Age: 76
End: 2021-01-02
Payer: COMMERCIAL

## 2021-01-02 ENCOUNTER — APPOINTMENT (OUTPATIENT)
Dept: CT IMAGING | Age: 76
End: 2021-01-02
Payer: COMMERCIAL

## 2021-01-02 ENCOUNTER — HOSPITAL ENCOUNTER (EMERGENCY)
Age: 76
Discharge: ANOTHER HOSPITAL | End: 2021-01-03
Attending: EMERGENCY MEDICINE
Payer: COMMERCIAL

## 2021-01-02 DIAGNOSIS — S72.031A: Primary | ICD-10-CM

## 2021-01-02 LAB
ANION GAP SERPL CALCULATED.3IONS-SCNC: 8 MMOL/L (ref 7–16)
APTT: 35.2 SEC (ref 24.5–35.1)
BUN BLDV-MCNC: 21 MG/DL (ref 8–23)
CALCIUM SERPL-MCNC: 9.3 MG/DL (ref 8.6–10.2)
CHLORIDE BLD-SCNC: 106 MMOL/L (ref 98–107)
CO2: 24 MMOL/L (ref 22–29)
CREAT SERPL-MCNC: 1.3 MG/DL (ref 0.5–1)
GFR AFRICAN AMERICAN: 48
GFR NON-AFRICAN AMERICAN: 40 ML/MIN/1.73
GLUCOSE BLD-MCNC: 148 MG/DL (ref 74–99)
INR BLD: 1.4
POTASSIUM SERPL-SCNC: 5.4 MMOL/L (ref 3.5–5)
PROTHROMBIN TIME: 15.7 SEC (ref 9.3–12.4)
SARS-COV-2, NAAT: NOT DETECTED
SODIUM BLD-SCNC: 138 MMOL/L (ref 132–146)

## 2021-01-02 PROCEDURE — 96376 TX/PRO/DX INJ SAME DRUG ADON: CPT

## 2021-01-02 PROCEDURE — 85610 PROTHROMBIN TIME: CPT

## 2021-01-02 PROCEDURE — U0002 COVID-19 LAB TEST NON-CDC: HCPCS

## 2021-01-02 PROCEDURE — 85730 THROMBOPLASTIN TIME PARTIAL: CPT

## 2021-01-02 PROCEDURE — 6360000002 HC RX W HCPCS

## 2021-01-02 PROCEDURE — 85025 COMPLETE CBC W/AUTO DIFF WBC: CPT

## 2021-01-02 PROCEDURE — 6360000002 HC RX W HCPCS: Performed by: EMERGENCY MEDICINE

## 2021-01-02 PROCEDURE — 80048 BASIC METABOLIC PNL TOTAL CA: CPT

## 2021-01-02 PROCEDURE — 96374 THER/PROPH/DIAG INJ IV PUSH: CPT

## 2021-01-02 PROCEDURE — 73700 CT LOWER EXTREMITY W/O DYE: CPT

## 2021-01-02 PROCEDURE — 73502 X-RAY EXAM HIP UNI 2-3 VIEWS: CPT

## 2021-01-02 PROCEDURE — 99285 EMERGENCY DEPT VISIT HI MDM: CPT

## 2021-01-02 RX ORDER — MORPHINE SULFATE 4 MG/ML
4 INJECTION, SOLUTION INTRAMUSCULAR; INTRAVENOUS ONCE
Status: COMPLETED | OUTPATIENT
Start: 2021-01-02 | End: 2021-01-02

## 2021-01-02 RX ORDER — MORPHINE SULFATE 4 MG/ML
INJECTION, SOLUTION INTRAMUSCULAR; INTRAVENOUS
Status: COMPLETED
Start: 2021-01-02 | End: 2021-01-02

## 2021-01-02 RX ORDER — MORPHINE SULFATE 4 MG/ML
INJECTION, SOLUTION INTRAMUSCULAR; INTRAVENOUS
Status: DISCONTINUED
Start: 2021-01-02 | End: 2021-01-03 | Stop reason: HOSPADM

## 2021-01-02 RX ORDER — MORPHINE SULFATE 10 MG/ML
8 INJECTION, SOLUTION INTRAMUSCULAR; INTRAVENOUS ONCE
Status: COMPLETED | OUTPATIENT
Start: 2021-01-02 | End: 2021-01-02

## 2021-01-02 RX ORDER — MORPHINE SULFATE 4 MG/ML
4 INJECTION, SOLUTION INTRAMUSCULAR; INTRAVENOUS ONCE
Status: DISCONTINUED | OUTPATIENT
Start: 2021-01-02 | End: 2021-01-02

## 2021-01-02 RX ADMIN — MORPHINE SULFATE 4 MG: 4 INJECTION, SOLUTION INTRAMUSCULAR; INTRAVENOUS at 22:06

## 2021-01-02 RX ADMIN — MORPHINE SULFATE 8 MG: 10 INJECTION, SOLUTION INTRAMUSCULAR; INTRAVENOUS at 22:07

## 2021-01-02 RX ADMIN — MORPHINE SULFATE 4 MG: 4 INJECTION, SOLUTION INTRAMUSCULAR; INTRAVENOUS at 20:06

## 2021-01-02 ASSESSMENT — ENCOUNTER SYMPTOMS
NAUSEA: 0
DIARRHEA: 0
WHEEZING: 0
RHINORRHEA: 0
CONSTIPATION: 0
PHOTOPHOBIA: 0
SINUS PAIN: 0
ABDOMINAL PAIN: 0
SHORTNESS OF BREATH: 0
VOMITING: 0
SINUS PRESSURE: 0
COUGH: 0
SORE THROAT: 0

## 2021-01-02 ASSESSMENT — PAIN DESCRIPTION - DESCRIPTORS: DESCRIPTORS: SHARP;SHOOTING;STABBING;TENDER

## 2021-01-02 ASSESSMENT — PAIN SCALES - GENERAL
PAINLEVEL_OUTOF10: 7
PAINLEVEL_OUTOF10: 10
PAINLEVEL_OUTOF10: 9

## 2021-01-02 ASSESSMENT — PAIN DESCRIPTION - LOCATION: LOCATION: HIP

## 2021-01-02 ASSESSMENT — PAIN DESCRIPTION - ORIENTATION: ORIENTATION: RIGHT

## 2021-01-02 ASSESSMENT — PAIN DESCRIPTION - PAIN TYPE: TYPE: ACUTE PAIN

## 2021-01-03 VITALS
OXYGEN SATURATION: 94 % | DIASTOLIC BLOOD PRESSURE: 84 MMHG | HEART RATE: 74 BPM | BODY MASS INDEX: 41.2 KG/M2 | RESPIRATION RATE: 19 BRPM | SYSTOLIC BLOOD PRESSURE: 160 MMHG | WEIGHT: 279 LBS | TEMPERATURE: 97.1 F

## 2021-01-03 LAB
BASOPHILS ABSOLUTE: 0 E9/L (ref 0–0.2)
BASOPHILS RELATIVE PERCENT: 0.2 % (ref 0–2)
BURR CELLS: ABNORMAL
EOSINOPHILS ABSOLUTE: 0.09 E9/L (ref 0.05–0.5)
EOSINOPHILS RELATIVE PERCENT: 0.9 % (ref 0–6)
HCT VFR BLD CALC: 38 % (ref 34–48)
HEMOGLOBIN: 12 G/DL (ref 11.5–15.5)
LYMPHOCYTES ABSOLUTE: 0.61 E9/L (ref 1.5–4)
LYMPHOCYTES RELATIVE PERCENT: 6.2 % (ref 20–42)
MCH RBC QN AUTO: 28.5 PG (ref 26–35)
MCHC RBC AUTO-ENTMCNC: 31.6 % (ref 32–34.5)
MCV RBC AUTO: 90.3 FL (ref 80–99.9)
MONOCYTES ABSOLUTE: 0.41 E9/L (ref 0.1–0.95)
MONOCYTES RELATIVE PERCENT: 4.4 % (ref 2–12)
NEUTROPHILS ABSOLUTE: 9.08 E9/L (ref 1.8–7.3)
NEUTROPHILS RELATIVE PERCENT: 88.5 % (ref 43–80)
OVALOCYTES: ABNORMAL
PDW BLD-RTO: 14 FL (ref 11.5–15)
PLATELET # BLD: 161 E9/L (ref 130–450)
PMV BLD AUTO: 11.3 FL (ref 7–12)
POIKILOCYTES: ABNORMAL
POLYCHROMASIA: ABNORMAL
RBC # BLD: 4.21 E12/L (ref 3.5–5.5)
WBC # BLD: 10.2 E9/L (ref 4.5–11.5)

## 2021-01-03 PROCEDURE — 96376 TX/PRO/DX INJ SAME DRUG ADON: CPT

## 2021-01-03 PROCEDURE — 6360000002 HC RX W HCPCS: Performed by: EMERGENCY MEDICINE

## 2021-01-03 RX ORDER — MORPHINE SULFATE 5 MG/ML
5 INJECTION, SOLUTION INTRAMUSCULAR; INTRAVENOUS ONCE
Status: COMPLETED | OUTPATIENT
Start: 2021-01-03 | End: 2021-01-03

## 2021-01-03 RX ADMIN — MORPHINE SULFATE 5 MG: 5 INJECTION, SOLUTION INTRAMUSCULAR; INTRAVENOUS at 01:10

## 2021-01-03 ASSESSMENT — PAIN SCALES - GENERAL: PAINLEVEL_OUTOF10: 10

## 2021-01-03 ASSESSMENT — PAIN DESCRIPTION - LOCATION: LOCATION: HIP

## 2021-01-03 NOTE — ED NOTES
Bed: 07  Expected date: 1/2/21  Expected time: 6:59 PM  Means of arrival: [facility]  Comments:     Emma Hodge RN  01/02/21 [id number]

## 2021-01-03 NOTE — ED PROVIDER NOTES
Patient is a 65-year-old female who arrives via EMS following a fall. Patient complains of right hip pain. States that she was in leaving after eating dinner and going down steps outside when she missed a step and fell onto her right side. Patient landed on her right hip and has pain. She has been unable to ambulate since the fall. She did not hit her head or lose consciousness. She is on Xarelto for history of A. fib and she has a pacemaker. Patient has had several orthopedic surgeries in the past and they have all been done at [facility]. Patient was given fentanyl in route. Patient denies any lightheadedness or dizziness, chest pain, shortness of breath, abdominal pain, nausea, vomiting. She denies any new numbness or tingling the extremity, she does of a known history of neuropathy from diabetes. The history is provided by the patient. No  was used. Review of Systems   Constitutional: Negative for chills, fatigue and fever. HENT: Negative for congestion, rhinorrhea, sinus pressure, sinus pain, sneezing and sore throat. Eyes: Negative for photophobia and visual disturbance. Respiratory: Negative for cough, shortness of breath and wheezing. Cardiovascular: Negative for chest pain and palpitations. Gastrointestinal: Negative for abdominal pain, constipation, diarrhea, nausea and vomiting. Genitourinary: Negative for dysuria, frequency and hematuria. Musculoskeletal: Positive for arthralgias (right hip pain). Negative for neck pain and neck stiffness. Skin: Negative for rash and wound. Neurological: Negative for dizziness, light-headedness and headaches. Psychiatric/Behavioral: Negative for agitation, behavioral problems and confusion. Physical Exam  Constitutional:       General: She is not in acute distress. Appearance: She is not toxic-appearing. HENT:      Head: Normocephalic.       Mouth/Throat:      Mouth: Mucous membranes this visit on 01/02/21. Radiology  CT HIP RIGHT WO CONTRAST   Final Result   1. Acute transcervical right femoral neck fracture. 2. Mild-to-moderate degenerative changes of the bilateral hips. 3. Chondrocalcinosis. XR HIP RIGHT (2-3 VIEWS)   Final Result   Subtle step-off along inferior aspect of right femoral neck could indicate   fracture. CT could be helpful for further evaluation.        ------------------------- NURSING NOTES AND VITALS REVIEWED ---------------------------  Date / Time Roomed:  1/2/2021  7:13 PM  ED Bed Assignment:  07/07    The nursing notes within the ED encounter and vital signs as below have been reviewed. Patient Vitals for the past 24 hrs:   BP Temp Temp src Pulse Resp SpO2 Weight   01/02/21 2212 (!) 145/99 97 °F (36.1 °C) Oral 91 18 94 % --   01/02/21 1913 (!) 173/95 97.7 °F (36.5 °C) Axillary 83 18 93 % 279 lb (126.6 kg)       Oxygen Saturation Interpretation: Normal      ------------------------------------------ PROGRESS NOTES ------------------------------------------  Re-evaluation(s):  Please see ED course. I have spoken with the patient and discussed todays results, in addition to providing specific details for the plan of care and counseling regarding the diagnosis and prognosis. Their questions are answered at this time and they are agreeable with the plan. I have discussed the risks and benefits of transfer and they wish to proceed with the transfer. --------------------------------- ADDITIONAL PROVIDER NOTES ---------------------------------  Consultations:  Time: 2219. Spoke with , orthopedics. Discussed case. He states that due to their hospital protocol internal medicine has to admit the patient due to her age. Otherwise he will provide consultation. Time: 2228. Spoke with , internal medicine. Discussed case, she will accept the patient as a transfer.   Once they receive bed assignment then the patient will be transferred over for possible surgery tomorrow morning. Reason for transfer: Orthopedic surgery, continuity of care. This patient's ED course included: a personal history and physicial examination, re-evaluation prior to disposition, IV medications, cardiac monitoring and continuous pulse oximetry    This patient has remained hemodynamically stable during their ED course. Please note that the withdrawal or failure to initiate urgent interventions for this patient would likely result in a life threatening deterioration or permanent disability. Accordingly this patient received 0 minutes of critical care time, excluding separately billable procedures. Clinical Impression  1. Closed transcervical fracture of right femur, initial encounter ([facility] [address]          Disposition  Patient's disposition: Transfer to [address] [place] [address] Transferred by: Ambulance. Patient's condition is stable.             Scarlett Pressley DO  01/02/21 2233

## 2021-03-02 ENCOUNTER — HOSPITAL ENCOUNTER (EMERGENCY)
Age: 76
Discharge: HOME | End: 2021-03-02
Attending: EMERGENCY MEDICINE
Payer: COMMERCIAL

## 2021-03-02 ENCOUNTER — APPOINTMENT (OUTPATIENT)
Dept: CT IMAGING | Age: 76
End: 2021-03-02
Payer: COMMERCIAL

## 2021-03-02 VITALS
TEMPERATURE: 98.2 F | OXYGEN SATURATION: 96 % | BODY MASS INDEX: 37.33 KG/M2 | HEART RATE: 85 BPM | DIASTOLIC BLOOD PRESSURE: 84 MMHG | HEIGHT: 69 IN | WEIGHT: 252 LBS | SYSTOLIC BLOOD PRESSURE: 141 MMHG | RESPIRATION RATE: 18 BRPM

## 2021-03-02 DIAGNOSIS — M25.551 RIGHT HIP PAIN: Primary | ICD-10-CM

## 2021-03-02 LAB
ALBUMIN SERPL-MCNC: 3.9 G/DL (ref 3.5–5.2)
ALP BLD-CCNC: 108 U/L (ref 35–104)
ALT SERPL-CCNC: 10 U/L (ref 0–32)
ANION GAP SERPL CALCULATED.3IONS-SCNC: 11 MMOL/L (ref 7–16)
ANISOCYTOSIS: ABNORMAL
AST SERPL-CCNC: 27 U/L (ref 0–31)
BACTERIA: ABNORMAL /HPF
BASOPHILS ABSOLUTE: 0 E9/L (ref 0–0.2)
BASOPHILS RELATIVE PERCENT: 0.3 % (ref 0–2)
BILIRUB SERPL-MCNC: 0.4 MG/DL (ref 0–1.2)
BILIRUBIN URINE: NEGATIVE
BLOOD, URINE: NEGATIVE
BUN BLDV-MCNC: 24 MG/DL (ref 8–23)
CALCIUM SERPL-MCNC: 9.8 MG/DL (ref 8.6–10.2)
CHLORIDE BLD-SCNC: 101 MMOL/L (ref 98–107)
CLARITY: ABNORMAL
CO2: 24 MMOL/L (ref 22–29)
COLOR: YELLOW
CREAT SERPL-MCNC: 1.3 MG/DL (ref 0.5–1)
EOSINOPHILS ABSOLUTE: 0 E9/L (ref 0.05–0.5)
EOSINOPHILS RELATIVE PERCENT: 0.1 % (ref 0–6)
EPITHELIAL CELLS, UA: ABNORMAL /HPF
GFR AFRICAN AMERICAN: 48
GFR NON-AFRICAN AMERICAN: 40 ML/MIN/1.73
GLUCOSE BLD-MCNC: 122 MG/DL (ref 74–99)
GLUCOSE URINE: NEGATIVE MG/DL
HCT VFR BLD CALC: 37.8 % (ref 34–48)
HEMOGLOBIN: 11.9 G/DL (ref 11.5–15.5)
HYPOCHROMIA: ABNORMAL
KETONES, URINE: NEGATIVE MG/DL
LACTIC ACID, SEPSIS: 1.6 MMOL/L (ref 0.5–1.9)
LEUKOCYTE ESTERASE, URINE: ABNORMAL
LYMPHOCYTES ABSOLUTE: 0.14 E9/L (ref 1.5–4)
LYMPHOCYTES RELATIVE PERCENT: 1.8 % (ref 20–42)
MCH RBC QN AUTO: 27.4 PG (ref 26–35)
MCHC RBC AUTO-ENTMCNC: 31.5 % (ref 32–34.5)
MCV RBC AUTO: 86.9 FL (ref 80–99.9)
MONOCYTES ABSOLUTE: 0 E9/L (ref 0.1–0.95)
MONOCYTES RELATIVE PERCENT: 3.1 % (ref 2–12)
NEUTROPHILS ABSOLUTE: 6.66 E9/L (ref 1.8–7.3)
NEUTROPHILS RELATIVE PERCENT: 98.2 % (ref 43–80)
NITRITE, URINE: NEGATIVE
OVALOCYTES: ABNORMAL
PDW BLD-RTO: 13.8 FL (ref 11.5–15)
PH UA: 8 (ref 5–9)
PLATELET # BLD: 250 E9/L (ref 130–450)
PMV BLD AUTO: 11 FL (ref 7–12)
POIKILOCYTES: ABNORMAL
POLYCHROMASIA: ABNORMAL
POTASSIUM REFLEX MAGNESIUM: 5.3 MMOL/L (ref 3.5–5)
POTASSIUM SERPL-SCNC: 5 MMOL/L (ref 3.5–5)
PROTEIN UA: NEGATIVE MG/DL
RBC # BLD: 4.35 E12/L (ref 3.5–5.5)
RBC UA: ABNORMAL /HPF (ref 0–2)
SEDIMENTATION RATE, ERYTHROCYTE: 29 MM/HR (ref 0–20)
SODIUM BLD-SCNC: 136 MMOL/L (ref 132–146)
SPECIFIC GRAVITY UA: 1.02 (ref 1–1.03)
TOTAL PROTEIN: 7.7 G/DL (ref 6.4–8.3)
TROPONIN: 0.02 NG/ML (ref 0–0.03)
UROBILINOGEN, URINE: 0.2 E.U./DL
WBC # BLD: 6.8 E9/L (ref 4.5–11.5)
WBC UA: ABNORMAL /HPF (ref 0–5)

## 2021-03-02 PROCEDURE — 99284 EMERGENCY DEPT VISIT MOD MDM: CPT

## 2021-03-02 PROCEDURE — 81001 URINALYSIS AUTO W/SCOPE: CPT

## 2021-03-02 PROCEDURE — 87040 BLOOD CULTURE FOR BACTERIA: CPT

## 2021-03-02 PROCEDURE — 85025 COMPLETE CBC W/AUTO DIFF WBC: CPT

## 2021-03-02 PROCEDURE — 84484 ASSAY OF TROPONIN QUANT: CPT

## 2021-03-02 PROCEDURE — 83605 ASSAY OF LACTIC ACID: CPT

## 2021-03-02 PROCEDURE — 86140 C-REACTIVE PROTEIN: CPT

## 2021-03-02 PROCEDURE — 93005 ELECTROCARDIOGRAM TRACING: CPT | Performed by: EMERGENCY MEDICINE

## 2021-03-02 PROCEDURE — 80053 COMPREHEN METABOLIC PANEL: CPT

## 2021-03-02 PROCEDURE — 73701 CT LOWER EXTREMITY W/DYE: CPT

## 2021-03-02 PROCEDURE — 84132 ASSAY OF SERUM POTASSIUM: CPT

## 2021-03-02 PROCEDURE — 36415 COLL VENOUS BLD VENIPUNCTURE: CPT

## 2021-03-02 PROCEDURE — 74177 CT ABD & PELVIS W/CONTRAST: CPT

## 2021-03-02 PROCEDURE — 85651 RBC SED RATE NONAUTOMATED: CPT

## 2021-03-02 PROCEDURE — 6360000004 HC RX CONTRAST MEDICATION: Performed by: RADIOLOGY

## 2021-03-02 RX ADMIN — IOPAMIDOL 75 ML: 755 INJECTION, SOLUTION INTRAVENOUS at 16:58

## 2021-03-02 ASSESSMENT — ENCOUNTER SYMPTOMS
ABDOMINAL PAIN: 0
SORE THROAT: 0
TACHYPNEA: 1
EYE PAIN: 0
VOMITING: 0
BACK PAIN: 0
SHORTNESS OF BREATH: 0
NAUSEA: 0

## 2021-03-02 ASSESSMENT — PAIN DESCRIPTION - LOCATION: LOCATION: GROIN;HIP

## 2021-03-02 ASSESSMENT — PAIN DESCRIPTION - FREQUENCY: FREQUENCY: CONTINUOUS

## 2021-03-02 ASSESSMENT — PAIN - FUNCTIONAL ASSESSMENT: PAIN_FUNCTIONAL_ASSESSMENT: 0-10

## 2021-03-02 ASSESSMENT — PAIN DESCRIPTION - DESCRIPTORS: DESCRIPTORS: BURNING

## 2021-03-02 ASSESSMENT — PAIN DESCRIPTION - ORIENTATION: ORIENTATION: RIGHT

## 2021-03-02 ASSESSMENT — PAIN SCALES - GENERAL: PAINLEVEL_OUTOF10: 8

## 2021-03-02 NOTE — PROGRESS NOTES
Radiology Procedure Waiver   Name: Tonya Bowie  : 1945  MRN: [id number]    Date:  3/2/21    Time: 4:58 PM EST    Benefits of immediately proceeding with radiology exam(s) without pre-testing outweigh the risks or are not indicated as specified below and therefore the following is/are being waived:    [x] Benefits of immediate radiology exam(s) outweigh any risk. OR    Pre-exam testing is not indicated for the following reason(s):  [] Pregnancy test   [] Patients LMP on-time and regular.   [] Patient had Tubal Ligation or has other Contraception Device. [] Patient  is Menopausal or Premenarcheal.    [] Patient had Full or Partial Hysterectomy. [] Protocol for CT contrast allegry   [] Patient has tolerated well previously   [] Patient does not have a true allergy    [] MRI Questionnaire     [] BUN/Creatinine   [] Patient age w/no hx of renal dysfunction. [] Patient on Dialysis. [] Recent Normal Labs.   Electronically signed by Rossi Ayala DO on 3/2/21 at 4:58 PM EST

## 2021-03-02 NOTE — ED PROVIDER NOTES
Patient is 50-year-old female presented emergency department with 2 months of right hip pain. Patient did have a right hip replacement on 3 January at [facility] clinic states that her right hip pain has gradually worsened/been constant since a hip replacement. She has been going to physical therapy and when ambulating however states that despite this the right hip plain has not improved. She denies any nausea, vomiting, diarrhea, warmth in the hip. States the pain is burning like inside the hip does not radiate. She does require assistance lifting the leg and states this has not improved since the hip replacement. The history is provided by the patient. No  was used. Groin Pain  This is a new problem. The current episode started more than 1 week ago. The problem occurs constantly. The problem has been gradually worsening. Pertinent negatives include no chest pain, no abdominal pain, no headaches and no shortness of breath. The symptoms are aggravated by walking, twisting and bending. The symptoms are relieved by rest. She has tried nothing for the symptoms. The treatment provided no relief. Review of Systems   Constitutional: Negative for chills and fever. HENT: Negative for ear pain and sore throat. Eyes: Negative for pain. Respiratory: Negative for shortness of breath. Cardiovascular: Negative for chest pain. Gastrointestinal: Negative for abdominal pain, nausea and vomiting. Genitourinary: Negative for flank pain and pelvic pain. Musculoskeletal: Positive for arthralgias (Right hip pain). Negative for back pain and neck pain. Skin: Negative for rash. Neurological: Negative for headaches. Physical Exam  Vitals signs and nursing note reviewed. Constitutional:       General: She is not in acute distress. Appearance: She is well-developed. She is obese. She is ill-appearing. HENT:      Head: Normocephalic and atraumatic.       Right Ear: here and in [facility] clinic for further evaluation however patient declined stating that she would call them tomorrow and go to their office. Amount and/or Complexity of Data Reviewed  Clinical lab tests: reviewed  Tests in the radiology section of CPT®: reviewed  Tests in the medicine section of CPT®: reviewed              --------------------------------------------- PAST HISTORY ---------------------------------------------  Past Medical History:  has a past medical history of Arthritis, Atrial fibrillation ([address] GERD (gastroesophageal reflux disease), History of blood transfusion, MI, old, Renal insufficiency, and Thyroid disease. Past Surgical History:  has a past surgical history that includes pacemaker placement; joint replacement; fracture surgery; Abdomen surgery; Gastric bypass surgery; Cholecystectomy; and Appendectomy. Social History:  reports that she has quit smoking. She has never used smokeless tobacco. She reports current alcohol use. She reports that she does not use drugs. Family History: family history is not on file. The patients home medications have been reviewed.     Allergies: Hydromorphone, Ciprofloxacin, Codeine, Dilaudid [hydromorphone hcl], Hydrocodone-acetaminophen, Percodan [oxycodone-aspirin], and Vicodin [hydrocodone-acetaminophen]    -------------------------------------------------- RESULTS -------------------------------------------------  Labs:  Results for orders placed or performed during the hospital encounter of 03/02/21   Culture, Blood 1    Specimen: Blood   Result Value Ref Range    Blood Culture, Routine 24 Hours no growth    Culture, Blood 2    Specimen: Blood   Result Value Ref Range    Culture, Blood 2 24 Hours no growth    CBC Auto Differential   Result Value Ref Range    WBC 6.8 4.5 - 11.5 E9/L    RBC 4.35 3.50 - 5.50 E12/L    Hemoglobin 11.9 11.5 - 15.5 g/dL    Hematocrit 37.8 34.0 - 48.0 %    MCV 86.9 80.0 - 99.9 fL    MCH 27.4 26.0 - 35.0 pg MCHC 31.5 (L) 32.0 - 34.5 %    RDW 13.8 11.5 - 15.0 fL    Platelets 985 678 - 975 E9/L    MPV 11.0 7.0 - 12.0 fL    Neutrophils % 98.2 (H) 43.0 - 80.0 %    Lymphocytes % 1.8 (L) 20.0 - 42.0 %    Monocytes % 3.1 2.0 - 12.0 %    Eosinophils % 0.1 0.0 - 6.0 %    Basophils % 0.3 0.0 - 2.0 %    Neutrophils Absolute 6.66 1.80 - 7.30 E9/L    Lymphocytes Absolute 0.14 (L) 1.50 - 4.00 E9/L    Monocytes Absolute 0.00 (L) 0.10 - 0.95 E9/L    Eosinophils Absolute 0.00 (L) 0.05 - 0.50 E9/L    Basophils Absolute 0.00 0.00 - 0.20 E9/L    Anisocytosis 1+     Polychromasia 1+     Hypochromia 1+     Poikilocytes 1+     Ovalocytes 1+    Comprehensive Metabolic Panel w/ Reflex to MG   Result Value Ref Range    Sodium 136 132 - 146 mmol/L    Potassium reflex Magnesium 5.3 (H) 3.5 - 5.0 mmol/L    Chloride 101 98 - 107 mmol/L    CO2 24 22 - 29 mmol/L    Anion Gap 11 7 - 16 mmol/L    Glucose 122 (H) 74 - 99 mg/dL    BUN 24 (H) 8 - 23 mg/dL    CREATININE 1.3 (H) 0.5 - 1.0 mg/dL    GFR Non-African American 40 >=60 mL/min/1.73    GFR African American 48     Calcium 9.8 8.6 - 10.2 mg/dL    Total Protein 7.7 6.4 - 8.3 g/dL    Albumin 3.9 3.5 - 5.2 g/dL    Total Bilirubin 0.4 0.0 - 1.2 mg/dL    Alkaline Phosphatase 108 (H) 35 - 104 U/L    ALT 10 0 - 32 U/L    AST 27 0 - 31 U/L   Troponin   Result Value Ref Range    Troponin 0.02 0.00 - 0.03 ng/mL   C-Reactive Protein   Result Value Ref Range    CRP 0.8 (H) 0.0 - 0.4 mg/dL   Urinalysis, reflex to microscopic   Result Value Ref Range    Color, UA Yellow Straw/Yellow    Clarity, UA SLCLOUDY Clear    Glucose, Ur Negative Negative mg/dL    Bilirubin Urine Negative Negative    Ketones, Urine Negative Negative mg/dL    Specific Gravity, UA 1.020 1.005 - 1.030    Blood, Urine Negative Negative    pH, UA 8.0 5.0 - 9.0    Protein, UA Negative Negative mg/dL    Urobilinogen, Urine 0.2 <2.0 E.U./dL    Nitrite, Urine Negative Negative    Leukocyte Esterase, Urine SMALL (A) Negative   Lactate, Sepsis Result Value Ref Range    Lactic Acid, Sepsis 1.6 0.5 - 1.9 mmol/L   Microscopic Urinalysis   Result Value Ref Range    WBC, UA 2-5 0 - 5 /HPF    RBC, UA NONE 0 - 2 /HPF    Epithelial Cells, UA RARE /HPF    Bacteria, UA FEW (A) None Seen /HPF   Potassium   Result Value Ref Range    Potassium 5.0 3.5 - 5.0 mmol/L   Sedimentation Rate   Result Value Ref Range    Sed Rate 29 (H) 0 - 20 mm/Hr   EKG 12 Lead   Result Value Ref Range    Ventricular Rate 83 BPM    Atrial Rate 267 BPM    QRS Duration 100 ms    Q-T Interval 390 ms    QTc Calculation (Bazett) 458 ms    R Axis -40 degrees    T Axis -51 degrees       Radiology:  CT ABDOMEN PELVIS W IV CONTRAST Additional Contrast? None   Final Result   No evidence of acute intra-abdominal or pelvic pathology. Surgical changes as noted. CT HIP RIGHT W CONTRAST   Final Result   1. Right hip arthroplasty. No CT evidence of complication.             ------------------------- NURSING NOTES AND VITALS REVIEWED ---------------------------  Date / Time Roomed:  3/2/2021  2:59 PM  ED Bed Assignment:  22/22    The nursing notes within the ED encounter and vital signs as below have been reviewed. BP (!) 141/84   Pulse 85   Temp 98.2 °F (36.8 °C) (Oral)   Resp 18   Ht 5' 9\" (1.753 m)   Wt 252 lb (114.3 kg)   SpO2 96%   BMI 37.21 kg/m²   Oxygen Saturation Interpretation: Normal      ------------------------------------------ PROGRESS NOTES ------------------------------------------  6:33 AM EST  I have spoken with the Patient and/or Family and discussed todays results, in addition to providing specific details for the plan of care and counseling regarding the diagnosis and prognosis. Labs and Imaging discussed with patient including appropriate follow- up and re-evaluation. Their questions are answered at this time and they are agreeable with the plan. I discussed at length with them reasons for immediate return here for re evaluation.  They will followup with PCP by calling their office Next Business Day      --------------------------------- ADDITIONAL PROVIDER NOTES ---------------------------------  At this time the patient is without objective evidence of an acute process requiring hospitalization or inpatient management. They have remained hemodynamically stable throughout their entire ED visit and are stable for discharge with outpatient follow-up. The plan has been discussed in detail and they are aware of the specific conditions for emergent return, as well as the importance of follow-up. Discharge Medication List as of 3/2/2021  7:16 PM          Diagnosis:  1. Right hip pain        Disposition:  Patient's disposition: Discharge to home  Patient's condition is stable.                  Matt Washington DO  Resident  03/05/21 [id number]

## 2021-03-03 LAB
C-REACTIVE PROTEIN: 0.8 MG/DL (ref 0–0.4)
EKG ATRIAL RATE: 267 BPM
EKG Q-T INTERVAL: 390 MS
EKG QRS DURATION: 100 MS
EKG QTC CALCULATION (BAZETT): 458 MS
EKG R AXIS: -40 DEGREES
EKG T AXIS: -51 DEGREES
EKG VENTRICULAR RATE: 83 BPM

## 2021-03-07 LAB
BLOOD CULTURE, ROUTINE: NORMAL
CULTURE, BLOOD 2: NORMAL

## 2021-05-07 ENCOUNTER — TELEPHONE (OUTPATIENT)
Dept: ADMINISTRATIVE | Age: 76
End: 2021-05-07

## 2021-05-07 NOTE — TELEPHONE ENCOUNTER
Pt informed per Dr. Jordan Baugh she does not treat hip pain, and pt should f/u with orthopedic doctor or pain management.      Pt verbalized understanding

## 2021-05-07 NOTE — TELEPHONE ENCOUNTER
Lenora Hogan called trying to schedule with Neuro. She said that he doctor sent over the referral on Monday and she hasn't received a phone call yet - she said that this has been going on for weeks now; he was told to see doctor Sherri Ahumada. I explained that the referral is probably over at the office and that I would send a note over asking office to call her back, and if she doesn't hear back from office by Monday afternoon to please call back and select option number 3 in the phone tree to reach the office directly  - [phone].

## 2021-11-03 ENCOUNTER — APPOINTMENT (OUTPATIENT)
Dept: CT IMAGING | Age: 76
End: 2021-11-03
Payer: COMMERCIAL

## 2021-11-03 ENCOUNTER — HOSPITAL ENCOUNTER (EMERGENCY)
Age: 76
Discharge: HOME | End: 2021-11-03
Attending: EMERGENCY MEDICINE
Payer: COMMERCIAL

## 2021-11-03 VITALS
RESPIRATION RATE: 18 BRPM | SYSTOLIC BLOOD PRESSURE: 170 MMHG | OXYGEN SATURATION: 98 % | TEMPERATURE: 98.5 F | DIASTOLIC BLOOD PRESSURE: 93 MMHG | HEART RATE: 96 BPM

## 2021-11-03 DIAGNOSIS — S01.01XA LACERATION OF SCALP, INITIAL ENCOUNTER: ICD-10-CM

## 2021-11-03 DIAGNOSIS — S09.90XA INJURY OF HEAD, INITIAL ENCOUNTER: Primary | ICD-10-CM

## 2021-11-03 DIAGNOSIS — M54.50 ACUTE LOW BACK PAIN WITHOUT SCIATICA, UNSPECIFIED BACK PAIN LATERALITY: ICD-10-CM

## 2021-11-03 PROCEDURE — 99283 EMERGENCY DEPT VISIT LOW MDM: CPT

## 2021-11-03 PROCEDURE — 12013 RPR F/E/E/N/L/M 2.6-5.0 CM: CPT

## 2021-11-03 PROCEDURE — 90471 IMMUNIZATION ADMIN: CPT | Performed by: EMERGENCY MEDICINE

## 2021-11-03 PROCEDURE — 6360000002 HC RX W HCPCS: Performed by: EMERGENCY MEDICINE

## 2021-11-03 PROCEDURE — 90714 TD VACC NO PRESV 7 YRS+ IM: CPT | Performed by: EMERGENCY MEDICINE

## 2021-11-03 PROCEDURE — 72131 CT LUMBAR SPINE W/O DYE: CPT

## 2021-11-03 PROCEDURE — 72125 CT NECK SPINE W/O DYE: CPT

## 2021-11-03 PROCEDURE — 70450 CT HEAD/BRAIN W/O DYE: CPT

## 2021-11-03 RX ADMIN — CLOSTRIDIUM TETANI TOXOID ANTIGEN (FORMALDEHYDE INACTIVATED) AND CORYNEBACTERIUM DIPHTHERIAE TOXOID ANTIGEN (FORMALDEHYDE INACTIVATED) 0.5 ML: 5; 2 INJECTION, SUSPENSION INTRAMUSCULAR at 12:03

## 2021-11-03 ASSESSMENT — ENCOUNTER SYMPTOMS
CHEST TIGHTNESS: 0
ABDOMINAL PAIN: 0
WHEEZING: 0
NAUSEA: 0
DIARRHEA: 0
EYE PAIN: 0
SHORTNESS OF BREATH: 0
SORE THROAT: 0
FACIAL SWELLING: 1
COUGH: 0
VOMITING: 0
BACK PAIN: 1

## 2021-11-03 ASSESSMENT — PAIN SCALES - GENERAL: PAINLEVEL_OUTOF10: 8

## 2021-11-03 ASSESSMENT — PAIN DESCRIPTION - PAIN TYPE: TYPE: ACUTE PAIN

## 2021-11-03 ASSESSMENT — PAIN DESCRIPTION - LOCATION: LOCATION: HEAD

## 2021-11-03 NOTE — ED PROVIDER NOTES
Chief complaint:  Head injury    HPI history provided by the patient and report  The patient comes in stating that she uses a cane and was trying to step over the curb and her toe caught the edge of the curb causing her to trip and fall forward. She states that she has a history of hip fracture and did not want to hurt her hip so she protected her hip and hit the ground with her head. Complains of bruising and small laceration to right forehead, eyebrow area. Denies loss of consciousness, has slight headache where she hit her head no other general headache. Mild general lumbar pain as well. Denies neck or thoracic pain. Denies any arm or leg pain or injuries, states she protected them well. No extremity numbness, tingling, paresthesias or weakness. No abdominal pain or chest pain or shortness of breath. Does take Xarelto. Bandage applied prior to arrival.  Bleeding controlled on arrival.    Review of Systems   Constitutional: Negative for chills, diaphoresis, fatigue and fever. HENT: Positive for facial swelling. Negative for congestion and sore throat. Eyes: Negative for pain. Respiratory: Negative for cough, chest tightness, shortness of breath and wheezing. Cardiovascular: Negative for chest pain and palpitations. Gastrointestinal: Negative for abdominal pain, diarrhea, nausea and vomiting. Genitourinary: Negative for dysuria, flank pain, frequency and urgency. Musculoskeletal: Positive for back pain. Negative for arthralgias, gait problem, joint swelling, myalgias, neck pain and neck stiffness. Skin: Negative for rash and wound. Neurological: Negative for dizziness, seizures, syncope, weakness, light-headedness, numbness and headaches. Hematological: Negative for adenopathy. All other systems reviewed and are negative. Physical Exam  Vitals and nursing note reviewed. Constitutional:       General: She is not in acute distress. Appearance: She is well-developed.  She is not ill-appearing, toxic-appearing or diaphoretic. HENT:      Head: Normocephalic. Contusion and laceration present. No raccoon eyes or Reddy's sign. Jaw: There is normal jaw occlusion. No malocclusion. Comments: Hematoma with 1 cm laceration to right eyebrow, forehead area. No other sign of acute head or face injury. Bleeding controlled. No nasal bridge, nasal or facial, maxillary or mandibular pain on exam.  Eyes:      General: No scleral icterus. Extraocular Movements: Extraocular movements intact. Conjunctiva/sclera: Conjunctivae normal.      Pupils: Pupils are equal, round, and reactive to light. Cardiovascular:      Rate and Rhythm: Normal rate and regular rhythm. Heart sounds: Normal heart sounds. No murmur heard. Pulmonary:      Effort: Pulmonary effort is normal. No respiratory distress. Breath sounds: Normal breath sounds. No stridor, decreased air movement or transmitted upper airway sounds. No decreased breath sounds, wheezing, rhonchi or rales. Chest:      Chest wall: No tenderness. Abdominal:      General: Bowel sounds are normal. There is no distension. There are no signs of injury. Palpations: Abdomen is soft. Tenderness: There is no abdominal tenderness. There is no right CVA tenderness, left CVA tenderness, guarding or rebound. Musculoskeletal:         General: No swelling, tenderness, deformity or signs of injury. Cervical back: Normal range of motion and neck supple. No signs of trauma or rigidity. No pain with movement, spinous process tenderness or muscular tenderness. Normal range of motion. Right lower leg: No edema. Left lower leg: No edema. Comments: Arms legs are palpated throughout their entirety show no sign of acute bony or joint injury and are neurovascular intact. No cervical or thoracic tenderness on palpation.   Vague and nonspecific general lumbar tenderness on palpation with no point bony tenderness or step-off. Patient actually sits up in the bed during the exam tolerates range of motion well. Pelvis is stable and nontender. No sternal, clavicular chest wall tenderness. Skin:     General: Skin is warm and dry. Coloration: Skin is not cyanotic, jaundiced, mottled or pale. Findings: Laceration present. No erythema or rash. Neurological:      General: No focal deficit present. Mental Status: She is alert and oriented to person, place, and time. GCS: GCS eye subscore is 4. GCS verbal subscore is 5. GCS motor subscore is 6. Cranial Nerves: Cranial nerves are intact. No cranial nerve deficit. Sensory: Sensation is intact. Motor: Motor function is intact. Coordination: Coordination is intact. Coordination normal.          Procedures     LACERATION REPAIR  PROCEDURE NOTE:  Unless otherwise indicated, this procedure was done or directly supervised by the ED attending. Laceration #: 1. Location: right eye brow  Length: 3cm. The wound area was irrigated with sterile saline and draped in a sterile fashion. The wound area was anesthetized with Lidocaine 1% without epinephrine. WOUND COMPLEXITY:    Debridement: None. Undermining: None. Wound Margins Revised: None. Flaps Aligned: yes. The wound was explored with the following results no foreign body or tendon injury seen. The wound was closed with 5-0 Ethilon using interrupted sutures. Dressing:  a bandage was placed. Total number suture: 3    [facility]                --------------------------------------------- PAST HISTORY ---------------------------------------------  Past Medical History:  has a past medical history of Arthritis, Atrial fibrillation ([address] GERD (gastroesophageal reflux disease), History of blood transfusion, MI, old, Renal insufficiency, and Thyroid disease. Past Surgical History:  has a past surgical history that includes pacemaker placement; joint replacement; fracture surgery;  Abdomen surgery; Gastric bypass surgery; Cholecystectomy; and Appendectomy. Social History:  reports that she has quit smoking. She has never used smokeless tobacco. She reports current alcohol use. She reports that she does not use drugs. Family History: family history is not on file. The patients home medications have been reviewed. Allergies: Hydromorphone, Ciprofloxacin, Codeine, Dilaudid [hydromorphone hcl], Hydrocodone-acetaminophen, Percodan [oxycodone-aspirin], and Vicodin [hydrocodone-acetaminophen]    -------------------------------------------------- RESULTS -------------------------------------------------  Labs:  No results found for this visit on 11/03/21. Radiology:  CT CERVICAL SPINE WO CONTRAST   Final Result   1. There is no acute compression fracture or subluxation of the cervical   spine. 2. Multilevel degenerative disc and degenerative joint disease. CT HEAD WO CONTRAST   Final Result   1. There is no acute intracranial abnormality. Specifically, there is no   intracranial hemorrhage. 2. Atrophy and periventricular leukomalacia,   3. Right periorbital and supraorbital soft tissue contusion hematoma. CT LUMBAR SPINE WO CONTRAST   Final Result   No acute osseous abnormality of the lumbar spine. Multilevel degenerative disc disease with spondylosis.             ------------------------- NURSING NOTES AND VITALS REVIEWED ---------------------------  Date / Time Roomed:  11/3/2021 11:33 AM  ED Bed Assignment:  21/21    The nursing notes within the ED encounter and vital signs as below have been reviewed.    BP (!) 170/93   Pulse 96   Temp 98.5 °F (36.9 °C) (Oral)   Resp 18   SpO2 98%   Oxygen Saturation Interpretation: Normal      ------------------------------------------ PROGRESS NOTES ------------------------------------------  I have spoken with the patient and discussed todays results, in addition to providing specific details for the plan of care and

## 2021-11-29 ENCOUNTER — HOSPITAL ENCOUNTER (EMERGENCY)
Age: 76
Discharge: HOME | End: 2021-11-29
Attending: EMERGENCY MEDICINE
Payer: COMMERCIAL

## 2021-11-29 ENCOUNTER — APPOINTMENT (OUTPATIENT)
Dept: CT IMAGING | Age: 76
End: 2021-11-29
Payer: COMMERCIAL

## 2021-11-29 ENCOUNTER — APPOINTMENT (OUTPATIENT)
Dept: GENERAL RADIOLOGY | Age: 76
End: 2021-11-29
Payer: COMMERCIAL

## 2021-11-29 VITALS
SYSTOLIC BLOOD PRESSURE: 175 MMHG | OXYGEN SATURATION: 95 % | TEMPERATURE: 96.4 F | HEIGHT: 69 IN | HEART RATE: 90 BPM | RESPIRATION RATE: 18 BRPM | WEIGHT: 252 LBS | BODY MASS INDEX: 37.33 KG/M2 | DIASTOLIC BLOOD PRESSURE: 113 MMHG

## 2021-11-29 DIAGNOSIS — R42 VERTIGO: Primary | ICD-10-CM

## 2021-11-29 LAB
ANION GAP SERPL CALCULATED.3IONS-SCNC: 14 MMOL/L (ref 7–16)
BASOPHILS ABSOLUTE: 0.02 E9/L (ref 0–0.2)
BASOPHILS RELATIVE PERCENT: 0.5 % (ref 0–2)
BILIRUBIN URINE: NEGATIVE
BLOOD, URINE: NEGATIVE
BUN BLDV-MCNC: 21 MG/DL (ref 6–23)
CALCIUM SERPL-MCNC: 9.6 MG/DL (ref 8.6–10.2)
CHLORIDE BLD-SCNC: 106 MMOL/L (ref 98–107)
CLARITY: CLEAR
CO2: 22 MMOL/L (ref 22–29)
COLOR: YELLOW
CREAT SERPL-MCNC: 1.4 MG/DL (ref 0.5–1)
EOSINOPHILS ABSOLUTE: 0.23 E9/L (ref 0.05–0.5)
EOSINOPHILS RELATIVE PERCENT: 5.2 % (ref 0–6)
GFR AFRICAN AMERICAN: 44
GFR NON-AFRICAN AMERICAN: 36 ML/MIN/1.73
GLUCOSE BLD-MCNC: 111 MG/DL (ref 74–99)
GLUCOSE URINE: NEGATIVE MG/DL
HCT VFR BLD CALC: 38.4 % (ref 34–48)
HEMOGLOBIN: 12 G/DL (ref 11.5–15.5)
IMMATURE GRANULOCYTES #: 0.02 E9/L
IMMATURE GRANULOCYTES %: 0.5 % (ref 0–5)
KETONES, URINE: NEGATIVE MG/DL
LEUKOCYTE ESTERASE, URINE: NEGATIVE
LYMPHOCYTES ABSOLUTE: 0.91 E9/L (ref 1.5–4)
LYMPHOCYTES RELATIVE PERCENT: 20.5 % (ref 20–42)
MCH RBC QN AUTO: 26.8 PG (ref 26–35)
MCHC RBC AUTO-ENTMCNC: 31.3 % (ref 32–34.5)
MCV RBC AUTO: 85.9 FL (ref 80–99.9)
MONOCYTES ABSOLUTE: 0.51 E9/L (ref 0.1–0.95)
MONOCYTES RELATIVE PERCENT: 11.5 % (ref 2–12)
NEUTROPHILS ABSOLUTE: 2.75 E9/L (ref 1.8–7.3)
NEUTROPHILS RELATIVE PERCENT: 61.8 % (ref 43–80)
NITRITE, URINE: NEGATIVE
PDW BLD-RTO: 15.1 FL (ref 11.5–15)
PH UA: 6 (ref 5–9)
PLATELET # BLD: 199 E9/L (ref 130–450)
PMV BLD AUTO: 11.4 FL (ref 7–12)
POTASSIUM SERPL-SCNC: 4 MMOL/L (ref 3.5–5)
PROTEIN UA: NEGATIVE MG/DL
RBC # BLD: 4.47 E12/L (ref 3.5–5.5)
SODIUM BLD-SCNC: 142 MMOL/L (ref 132–146)
SPECIFIC GRAVITY UA: 1.02 (ref 1–1.03)
TROPONIN, HIGH SENSITIVITY: 33 NG/L (ref 0–9)
TROPONIN, HIGH SENSITIVITY: 35 NG/L (ref 0–9)
UROBILINOGEN, URINE: 0.2 E.U./DL
WBC # BLD: 4.4 E9/L (ref 4.5–11.5)

## 2021-11-29 PROCEDURE — 70450 CT HEAD/BRAIN W/O DYE: CPT

## 2021-11-29 PROCEDURE — 81003 URINALYSIS AUTO W/O SCOPE: CPT

## 2021-11-29 PROCEDURE — 99283 EMERGENCY DEPT VISIT LOW MDM: CPT

## 2021-11-29 PROCEDURE — 2580000003 HC RX 258: Performed by: EMERGENCY MEDICINE

## 2021-11-29 PROCEDURE — 85025 COMPLETE CBC W/AUTO DIFF WBC: CPT

## 2021-11-29 PROCEDURE — 93005 ELECTROCARDIOGRAM TRACING: CPT | Performed by: PHYSICIAN ASSISTANT

## 2021-11-29 PROCEDURE — 6370000000 HC RX 637 (ALT 250 FOR IP): Performed by: GENERAL PRACTICE

## 2021-11-29 PROCEDURE — 80048 BASIC METABOLIC PNL TOTAL CA: CPT

## 2021-11-29 PROCEDURE — 84484 ASSAY OF TROPONIN QUANT: CPT

## 2021-11-29 PROCEDURE — 71046 X-RAY EXAM CHEST 2 VIEWS: CPT

## 2021-11-29 RX ORDER — 0.9 % SODIUM CHLORIDE 0.9 %
500 INTRAVENOUS SOLUTION INTRAVENOUS ONCE
Status: COMPLETED | OUTPATIENT
Start: 2021-11-29 | End: 2021-11-29

## 2021-11-29 RX ORDER — CETIRIZINE HYDROCHLORIDE 10 MG/1
10 TABLET ORAL DAILY
COMMUNITY

## 2021-11-29 RX ORDER — ACETAMINOPHEN 160 MG
TABLET,DISINTEGRATING ORAL
COMMUNITY
End: 2022-09-02 | Stop reason: DRUGHIGH

## 2021-11-29 RX ORDER — GABAPENTIN 300 MG/1
300 CAPSULE ORAL 3 TIMES DAILY
COMMUNITY

## 2021-11-29 RX ORDER — MECLIZINE HCL 12.5 MG/1
25 TABLET ORAL ONCE
Status: COMPLETED | OUTPATIENT
Start: 2021-11-29 | End: 2021-11-29

## 2021-11-29 RX ORDER — HYDROXYZINE HYDROCHLORIDE 10 MG/1
10 TABLET, FILM COATED ORAL DAILY
COMMUNITY

## 2021-11-29 RX ORDER — MECLIZINE HCL 12.5 MG/1
12.5 TABLET ORAL 3 TIMES DAILY PRN
Qty: 30 TABLET | Refills: 0 | Status: SHIPPED | OUTPATIENT
Start: 2021-11-29 | End: 2021-12-09

## 2021-11-29 RX ADMIN — SODIUM CHLORIDE 500 ML: 9 INJECTION, SOLUTION INTRAVENOUS at 17:38

## 2021-11-29 RX ADMIN — MECLIZINE 25 MG: 12.5 TABLET ORAL at 17:51

## 2021-11-29 ASSESSMENT — ENCOUNTER SYMPTOMS
SORE THROAT: 0
EYE PAIN: 0
SHORTNESS OF BREATH: 0
VOMITING: 0
ABDOMINAL DISTENTION: 0
WHEEZING: 0
EYE REDNESS: 0
DIARRHEA: 0
EYE DISCHARGE: 0
SINUS PRESSURE: 0
COUGH: 0
BACK PAIN: 0
NAUSEA: 0

## 2021-11-29 ASSESSMENT — PAIN DESCRIPTION - LOCATION: LOCATION: HEAD

## 2021-11-29 ASSESSMENT — PAIN DESCRIPTION - PAIN TYPE: TYPE: ACUTE PAIN

## 2021-11-29 ASSESSMENT — PAIN DESCRIPTION - DESCRIPTORS: DESCRIPTORS: HEADACHE

## 2021-11-29 ASSESSMENT — PAIN DESCRIPTION - FREQUENCY: FREQUENCY: CONTINUOUS

## 2021-11-29 NOTE — ED NOTES
Radiology Procedure Waiver   Name: Kaylyn Cantor  : 1945  MRN: [id number]    Date:  21    Time: 6:58 PM EST    Benefits of immediately proceeding with radiology exam(s) without pre-testing outweigh the risks or are not indicated as specified below and therefore the following is/are being waived:    [x] Benefits of immediate radiology exam(s) outweigh any risk. OR    Pre-exam testing is not indicated for the following reason(s):  [] Pregnancy test   [] Patients LMP on-time and regular.   [] Patient had Tubal Ligation or has other Contraception Device. [] Patient  is Menopausal or Premenarcheal.    [] Patient had Full or Partial Hysterectomy. [] Protocol for CT contrast allegry   [] Patient has tolerated well previously   [] Patient does not have a true allergy    [] MRI Questionnaire     [] BUN/Creatinine   [] Patient age w/no hx of renal dysfunction. [] Patient on Dialysis. [] Recent Normal Labs.   Electronically signed by Diogo Talley DO on 21 at 6:58 PM EST                 Peterson Egan DO  Resident  [phone]

## 2021-11-29 NOTE — ED NOTES
Name: Rosalind Calderon  : 1945  MRN: [id number]    Date: 2021    Benefits of immediately proceeding with Radiology exam outweigh the risks and therefore the following is being waived:      [] Pregnancy test    [] Protocol for Iodine allergy    [] MRI questionnaire    [x] BUN/Creatinine        MD Leni Marin MD  21 [id number]

## 2021-11-29 NOTE — ED PROVIDER NOTES
ED  Provider Note  Admit Date/RoomTime: 11/29/2021  3:13 PM  ED Room: 07/07     HPI:   Shirley Easton is a 67 y.o. female presenting to the ED for dizzyness, beginning 8 hours ago. History comes primarily from the patient. Past medical history includes fibrillation (on Xarelto), hypothyroidism, renal failure. The complaint persistent, has been constant, mild in severity, improved by nothing and worsened by changing position. Associated symptoms include none. Radha Jordinolamide states that she got up to go to the bathroom early this morning, and noted that the room was spinning around her, rotating in a rightward direction. She states that she has never had such a vertiginous symptom before and it makes her feel unsteady on her feet. The symptoms have persisted over the course the last 8 hours. She had no sensation of the symptoms before going to bed the night previously. For this reason she presented to [facility] emergency department for further evaluation and treatment. On arrival, the patient was assessed with history, physical exam, imaging studies and laboratory studies, vital signs. Vital signs were stable on arrival and the patient was afebrile. Review of Systems   Constitutional: Positive for activity change. Negative for chills and fever. HENT: Negative for ear pain, sinus pressure and sore throat. Eyes: Negative for pain, discharge and redness. Respiratory: Negative for cough, shortness of breath and wheezing. Cardiovascular: Negative for chest pain. Gastrointestinal: Negative for abdominal distention, diarrhea, nausea and vomiting. Genitourinary: Negative for dysuria and frequency. Musculoskeletal: Negative for arthralgias and back pain. Skin: Negative for rash and wound. Neurological: Positive for dizziness. Negative for weakness and headaches. Hematological: Negative for adenopathy. All other systems reviewed and are negative.        Physical Exam  Vitals and nursing note reviewed. Constitutional:       Appearance: She is well-developed. HENT:      Head: Normocephalic and atraumatic. Eyes:      Pupils: Pupils are equal, round, and reactive to light. Cardiovascular:      Rate and Rhythm: Normal rate and regular rhythm. Pulmonary:      Effort: Pulmonary effort is normal. No respiratory distress. Breath sounds: Normal breath sounds. No wheezing or rales. Abdominal:      General: Bowel sounds are normal.      Palpations: Abdomen is soft. Tenderness: There is no abdominal tenderness. There is no guarding or rebound. Musculoskeletal:      Cervical back: Normal range of motion and neck supple. Skin:     General: Skin is warm and dry. Capillary Refill: Capillary refill takes less than 2 seconds. Neurological:      Mental Status: She is alert and oriented to person, place, and time. Cranial Nerves: No cranial nerve deficit. Coordination: Coordination normal.      Comments: Head impulse test exam positive with a leftward beating nystagmus appreciated. .  No focal deficits otherwise appreciated. EKG interpretation  Rate 90  Atrial fibrillation  Left axis deviation  Left anterior fascicular block  Q waves noted in anterior lateral leads consistent with old infarct  Abnormal EKG, unchanged from previous    Procedures     MDM  Number of Diagnoses or Management Options  Vertigo  Diagnosis management comments: Emergency Department evaluation was notable for reassuring work-up in the setting of dizziness. Patient symptoms were significantly improved with administration of meclizine, imaging studies were negative for evidence of obvious intracranial hemorrhage or mass. Laboratory studies were generally unremarkable, patient was considered stable for discharge to home with outpatient follow-up for her likely benign paroxysmal positional vertigo.     They were advised to return to the emergency department should they develop fever, chills, night sweats, nausea, vomiting, diarrhea, chest pain, shortness of breath, or worsening of their symptoms despite treatment from this emergency department visit. They were instructed to follow-up with their primary care provider in 2 days. This information was relayed to the patient who understood this plan of care and was amenable to the plan. Amount and/or Complexity of Data Reviewed  Clinical lab tests: reviewed  Tests in the radiology section of CPT®: reviewed  Tests in the medicine section of CPT®: reviewed  Decide to obtain previous medical records or to obtain history from someone other than the patient: yes              --------------------------------------------- PAST HISTORY ---------------------------------------------  Past Medical History:  has a past medical history of Arthritis, Atrial fibrillation ([facility] [address] GERD (gastroesophageal reflux disease), History of blood transfusion, MI, old, Renal insufficiency, and Thyroid disease. Past Surgical History:  has a past surgical history that includes pacemaker placement; joint replacement; fracture surgery; Abdomen surgery; Gastric bypass surgery; Cholecystectomy; and Appendectomy. Social History:  reports that she has quit smoking. She has never used smokeless tobacco. She reports current alcohol use. She reports that she does not use drugs. Family History: family history is not on file. The patients home medications have been reviewed.     Allergies: Hydromorphone, Ciprofloxacin, Codeine, Dilaudid [hydromorphone hcl], Hydrocodone-acetaminophen, Percodan [oxycodone-aspirin], and Vicodin [hydrocodone-acetaminophen]    -------------------------------------------------- RESULTS -------------------------------------------------  Labs:  Results for orders placed or performed during the hospital encounter of 11/29/21   CBC Auto Differential   Result Value Ref Range    WBC 4.4 (L) 4.5 - 11.5 E9/L    RBC 4.47 3.50 - 5.50 E12/L    Hemoglobin 12.0 11.5 - 15.5 g/dL    Hematocrit 38.4 34.0 - 48.0 %    MCV 85.9 80.0 - 99.9 fL    MCH 26.8 26.0 - 35.0 pg    MCHC 31.3 (L) 32.0 - 34.5 %    RDW 15.1 (H) 11.5 - 15.0 fL    Platelets 188 175 - 490 E9/L    MPV 11.4 7.0 - 12.0 fL    Neutrophils % 61.8 43.0 - 80.0 %    Immature Granulocytes % 0.5 0.0 - 5.0 %    Lymphocytes % 20.5 20.0 - 42.0 %    Monocytes % 11.5 2.0 - 12.0 %    Eosinophils % 5.2 0.0 - 6.0 %    Basophils % 0.5 0.0 - 2.0 %    Neutrophils Absolute 2.75 1.80 - 7.30 E9/L    Immature Granulocytes # 0.02 E9/L    Lymphocytes Absolute 0.91 (L) 1.50 - 4.00 E9/L    Monocytes Absolute 0.51 0.10 - 0.95 E9/L    Eosinophils Absolute 0.23 0.05 - 0.50 E9/L    Basophils Absolute 0.02 0.00 - 0.20 H2/O   Basic Metabolic Panel   Result Value Ref Range    Sodium 142 132 - 146 mmol/L    Potassium 4.0 3.5 - 5.0 mmol/L    Chloride 106 98 - 107 mmol/L    CO2 22 22 - 29 mmol/L    Anion Gap 14 7 - 16 mmol/L    Glucose 111 (H) 74 - 99 mg/dL    BUN 21 6 - 23 mg/dL    CREATININE 1.4 (H) 0.5 - 1.0 mg/dL    GFR Non-African American 36 >=60 mL/min/1.73    GFR African American 44     Calcium 9.6 8.6 - 10.2 mg/dL   Troponin   Result Value Ref Range    Troponin, High Sensitivity 35 (H) 0 - 9 ng/L   Urinalysis   Result Value Ref Range    Color, UA Yellow Straw/Yellow    Clarity, UA Clear Clear    Glucose, Ur Negative Negative mg/dL    Bilirubin Urine Negative Negative    Ketones, Urine Negative Negative mg/dL    Specific Gravity, UA 1.025 1.005 - 1.030    Blood, Urine Negative Negative    pH, UA 6.0 5.0 - 9.0    Protein, UA Negative Negative mg/dL    Urobilinogen, Urine 0.2 <2.0 E.U./dL    Nitrite, Urine Negative Negative    Leukocyte Esterase, Urine Negative Negative   Troponin   Result Value Ref Range    Troponin, High Sensitivity 33 (H) 0 - 9 ng/L   EKG 12 Lead   Result Value Ref Range    Ventricular Rate 90 BPM    Atrial Rate 37 BPM    QRS Duration 104 ms    Q-T Interval 412 ms    QTc Calculation (Bazett) 504 ms    R Axis -60 degrees T Axis 45 degrees       Radiology:  XR CHEST (2 VW)   Final Result   No acute process. Stable cardiomegaly. CT HEAD WO CONTRAST   Final Result   1. No acute intracranial abnormality. 2. Chronic small vessel ischemic disease.             ------------------------- NURSING NOTES AND VITALS REVIEWED ---------------------------  Date / Time Roomed:  11/29/2021  3:13 PM  ED Bed Assignment:  [facility]/    The nursing notes within the ED encounter and vital signs as below have been reviewed. BP (!) 175/113   Pulse 90   Temp 96.4 °F (35.8 °C) (Temporal)   Resp 18   Ht 5' 9\" (1.753 m)   Wt 252 lb (114.3 kg)   SpO2 95%   BMI 37.21 kg/m²   Oxygen Saturation Interpretation: Normal      ------------------------------------------ PROGRESS NOTES ------------------------------------------  1:07 AM EST  I have spoken with the patient and discussed todays results, in addition to providing specific details for the plan of care and counseling regarding the diagnosis and prognosis. Their questions are answered at this time and they are agreeable with the plan. I discussed at length with them reasons for immediate return here for re evaluation. They will followup with their primary care physician by calling their office tomorrow. --------------------------------- ADDITIONAL PROVIDER NOTES ---------------------------------  At this time the patient is without objective evidence of an acute process requiring hospitalization or inpatient management. They have remained hemodynamically stable throughout their entire ED visit and are stable for discharge with outpatient follow-up. The plan has been discussed in detail and they are aware of the specific conditions for emergent return, as well as the importance of follow-up.       Discharge Medication List as of 11/29/2021  8:37 PM      START taking these medications    Details   meclizine (ANTIVERT) 12.5 MG tablet Take 1 tablet by mouth 3 times daily as needed for Dizziness, Disp-30 tablet, R-0Normal             Diagnosis:  1. Vertigo        Disposition:  Patient's disposition: Discharge to home  Patient's condition is stable.             Hanna Shannon 42., DO  Resident  11/30/21 4818

## 2021-11-29 NOTE — ED NOTES
Bed: H5  Expected date:   Expected time:   Means of arrival:   Comments:  [address] [place] [address] [address], RN  11/29/21 5011

## 2021-11-29 NOTE — ED NOTES
FIRST PROVIDER CONTACT ASSESSMENT NOTE           Department of Emergency Medicine                 First Provider Note            21  12:17 PM EST    Date of Encounter: No admission date for patient encounter. Patient Name: Nidhi Harris  : 1945  MRN: [id number]    Chief Complaint: Dizziness (room spinning times 8 hours), Headache, and Nausea      History of Present Illness:   Nidhi Harris is a 67 y.o. female who presents to the ED for dizziness, room spinning since 0400 this morning. She also c/o headache. Denies recent fall/injury. Focused Physical Exam:  VS:    ED Triage Vitals [21 1135]   BP Temp Temp Source Pulse Resp SpO2 Height Weight   -- 96.4 °F (35.8 °C) Temporal -- -- -- -- --        Physical Ex: Constitutional: Alert and non-toxic. Patient was able to ambulate. Medical History:  has a past medical history of Arthritis, Atrial fibrillation ([address] GERD (gastroesophageal reflux disease), History of blood transfusion, MI, old, Renal insufficiency, and Thyroid disease. Surgical History:  has a past surgical history that includes pacemaker placement; joint replacement; fracture surgery; Abdomen surgery; Gastric bypass surgery; Cholecystectomy; and Appendectomy. Social History:  reports that she has quit smoking. She has never used smokeless tobacco. She reports current alcohol use. She reports that she does not use drugs. Family History: family history is not on file.     Allergies: Hydromorphone, Ciprofloxacin, Codeine, Dilaudid [hydromorphone hcl], Hydrocodone-acetaminophen, Percodan [oxycodone-aspirin], and Vicodin [hydrocodone-acetaminophen]     Initial Plan of Care: Initiate Treatment-Testing, Proceed toTreatment Area When Bed Available for ED Attending/MLP to Continue Care      ---END OF FIRST PROVIDER CONTACT ASSESSMENT NOTE---  Electronically signed by ALEX Pollack   DD: 21       ALEX Pollack  21 [address] [place], [place]  21 [id number] [facility]

## 2021-12-01 LAB
EKG ATRIAL RATE: 37 BPM
EKG Q-T INTERVAL: 412 MS
EKG QRS DURATION: 104 MS
EKG QTC CALCULATION (BAZETT): 504 MS
EKG R AXIS: -60 DEGREES
EKG T AXIS: 45 DEGREES
EKG VENTRICULAR RATE: 90 BPM

## 2022-05-12 ENCOUNTER — HOSPITAL ENCOUNTER (OUTPATIENT)
Age: 77
Discharge: HOME | End: 2022-05-12
Payer: COMMERCIAL

## 2022-05-12 LAB — POTASSIUM SERPL-SCNC: 4.4 MMOL/L (ref 3.5–5)

## 2022-05-12 PROCEDURE — 84132 ASSAY OF SERUM POTASSIUM: CPT

## 2022-05-12 PROCEDURE — 36415 COLL VENOUS BLD VENIPUNCTURE: CPT

## 2022-09-02 ENCOUNTER — APPOINTMENT (OUTPATIENT)
Dept: CT IMAGING | Age: 77
DRG: 137 | End: 2022-09-02
Payer: COMMERCIAL

## 2022-09-02 ENCOUNTER — APPOINTMENT (OUTPATIENT)
Dept: GENERAL RADIOLOGY | Age: 77
DRG: 137 | End: 2022-09-02
Payer: COMMERCIAL

## 2022-09-02 ENCOUNTER — HOSPITAL ENCOUNTER (INPATIENT)
Age: 77
LOS: 3 days | Discharge: HOME | DRG: 137 | End: 2022-09-05
Attending: EMERGENCY MEDICINE | Admitting: INTERNAL MEDICINE
Payer: COMMERCIAL

## 2022-09-02 DIAGNOSIS — U07.1 COVID-19: ICD-10-CM

## 2022-09-02 DIAGNOSIS — S09.90XA CLOSED HEAD INJURY, INITIAL ENCOUNTER: ICD-10-CM

## 2022-09-02 DIAGNOSIS — D64.9 ANEMIA, UNSPECIFIED TYPE: ICD-10-CM

## 2022-09-02 DIAGNOSIS — R53.1 GENERALIZED WEAKNESS: ICD-10-CM

## 2022-09-02 DIAGNOSIS — J96.01 ACUTE RESPIRATORY FAILURE WITH HYPOXIA (HCC): Primary | ICD-10-CM
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
## 2022-09-02 DIAGNOSIS — Z79.01 ANTICOAGULATED: ICD-10-CM

## 2022-09-02 LAB
ALBUMIN SERPL-MCNC: 3.9 G/DL (ref 3.5–5.2)
ALP BLD-CCNC: 116 U/L (ref 35–104)
ALT SERPL-CCNC: 12 U/L (ref 0–32)
ANION GAP SERPL CALCULATED.3IONS-SCNC: 10 MMOL/L (ref 7–16)
ANISOCYTOSIS: ABNORMAL
AST SERPL-CCNC: 23 U/L (ref 0–31)
BACTERIA: ABNORMAL /HPF
BASOPHILS ABSOLUTE: 0.04 E9/L (ref 0–0.2)
BASOPHILS RELATIVE PERCENT: 0.9 % (ref 0–2)
BILIRUB SERPL-MCNC: 0.7 MG/DL (ref 0–1.2)
BILIRUBIN URINE: NEGATIVE
BLOOD, URINE: NEGATIVE
BUN BLDV-MCNC: 21 MG/DL (ref 6–23)
CALCIUM SERPL-MCNC: 9.2 MG/DL (ref 8.6–10.2)
CHLORIDE BLD-SCNC: 101 MMOL/L (ref 98–107)
CLARITY: CLEAR
CO2: 24 MMOL/L (ref 22–29)
COLOR: YELLOW
CREAT SERPL-MCNC: 1.4 MG/DL (ref 0.5–1)
EKG ATRIAL RATE: 19 BPM
EKG Q-T INTERVAL: 384 MS
EKG QRS DURATION: 106 MS
EKG QTC CALCULATION (BAZETT): 482 MS
EKG R AXIS: -29 DEGREES
EKG T AXIS: 4 DEGREES
EKG VENTRICULAR RATE: 95 BPM
EOSINOPHILS ABSOLUTE: 0.04 E9/L (ref 0.05–0.5)
EOSINOPHILS RELATIVE PERCENT: 0.9 % (ref 0–6)
EPITHELIAL CELLS, UA: ABNORMAL /HPF
GFR AFRICAN AMERICAN: 44
GFR NON-AFRICAN AMERICAN: 36 ML/MIN/1.73
GLUCOSE BLD-MCNC: 89 MG/DL (ref 74–99)
GLUCOSE URINE: NEGATIVE MG/DL
HCT VFR BLD CALC: 32.2 % (ref 34–48)
HEMOGLOBIN: 9.5 G/DL (ref 11.5–15.5)
HYPOCHROMIA: ABNORMAL
KETONES, URINE: NEGATIVE MG/DL
LACTATE DEHYDROGENASE: 238 U/L (ref 135–214)
LACTIC ACID: 0.8 MMOL/L (ref 0.5–2.2)
LEUKOCYTE ESTERASE, URINE: ABNORMAL
LIPASE: 37 U/L (ref 13–60)
LYMPHOCYTES ABSOLUTE: 0.14 E9/L (ref 1.5–4)
LYMPHOCYTES RELATIVE PERCENT: 2.6 % (ref 20–42)
MCH RBC QN AUTO: 23.3 PG (ref 26–35)
MCHC RBC AUTO-ENTMCNC: 29.5 % (ref 32–34.5)
MCV RBC AUTO: 78.9 FL (ref 80–99.9)
MONOCYTES ABSOLUTE: 0.4 E9/L (ref 0.1–0.95)
MONOCYTES RELATIVE PERCENT: 8.7 % (ref 2–12)
NEUTROPHILS ABSOLUTE: 3.92 E9/L (ref 1.8–7.3)
NEUTROPHILS RELATIVE PERCENT: 87 % (ref 43–80)
NITRITE, URINE: NEGATIVE
OVALOCYTES: ABNORMAL
PDW BLD-RTO: 17.1 FL (ref 11.5–15)
PH UA: 7 (ref 5–9)
PLATELET # BLD: 154 E9/L (ref 130–450)
PMV BLD AUTO: 11.8 FL (ref 7–12)
POIKILOCYTES: ABNORMAL
POLYCHROMASIA: ABNORMAL
POTASSIUM REFLEX MAGNESIUM: 4.4 MMOL/L (ref 3.5–5)
PRO-BNP: 5329 PG/ML (ref 0–450)
PROTEIN UA: NEGATIVE MG/DL
RBC # BLD: 4.08 E12/L (ref 3.5–5.5)
RBC UA: ABNORMAL /HPF (ref 0–2)
SARS-COV-2, NAAT: DETECTED
SODIUM BLD-SCNC: 135 MMOL/L (ref 132–146)
SPECIFIC GRAVITY UA: 1.01 (ref 1–1.03)
TARGET CELLS: ABNORMAL
TOTAL PROTEIN: 6.9 G/DL (ref 6.4–8.3)
TROPONIN, HIGH SENSITIVITY: 52 NG/L (ref 0–9)
TROPONIN, HIGH SENSITIVITY: 58 NG/L (ref 0–9)
UROBILINOGEN, URINE: 0.2 E.U./DL
WBC # BLD: 4.5 E9/L (ref 4.5–11.5)
WBC UA: ABNORMAL /HPF (ref 0–5)

## 2022-09-02 PROCEDURE — 6360000002 HC RX W HCPCS: Performed by: STUDENT IN AN ORGANIZED HEALTH CARE EDUCATION/TRAINING PROGRAM

## 2022-09-02 PROCEDURE — 84484 ASSAY OF TROPONIN QUANT: CPT

## 2022-09-02 PROCEDURE — 83880 ASSAY OF NATRIURETIC PEPTIDE: CPT

## 2022-09-02 PROCEDURE — 93005 ELECTROCARDIOGRAM TRACING: CPT | Performed by: STUDENT IN AN ORGANIZED HEALTH CARE EDUCATION/TRAINING PROGRAM

## 2022-09-02 PROCEDURE — 36415 COLL VENOUS BLD VENIPUNCTURE: CPT

## 2022-09-02 PROCEDURE — 96374 THER/PROPH/DIAG INJ IV PUSH: CPT

## 2022-09-02 PROCEDURE — 83605 ASSAY OF LACTIC ACID: CPT

## 2022-09-02 PROCEDURE — 2580000003 HC RX 258: Performed by: STUDENT IN AN ORGANIZED HEALTH CARE EDUCATION/TRAINING PROGRAM

## 2022-09-02 PROCEDURE — 71045 X-RAY EXAM CHEST 1 VIEW: CPT

## 2022-09-02 PROCEDURE — 72125 CT NECK SPINE W/O DYE: CPT

## 2022-09-02 PROCEDURE — 70450 CT HEAD/BRAIN W/O DYE: CPT

## 2022-09-02 PROCEDURE — 1200000000 HC SEMI PRIVATE

## 2022-09-02 PROCEDURE — 6370000000 HC RX 637 (ALT 250 FOR IP): Performed by: STUDENT IN AN ORGANIZED HEALTH CARE EDUCATION/TRAINING PROGRAM

## 2022-09-02 PROCEDURE — 85025 COMPLETE CBC W/AUTO DIFF WBC: CPT

## 2022-09-02 PROCEDURE — 81001 URINALYSIS AUTO W/SCOPE: CPT

## 2022-09-02 PROCEDURE — 86140 C-REACTIVE PROTEIN: CPT

## 2022-09-02 PROCEDURE — 80053 COMPREHEN METABOLIC PANEL: CPT

## 2022-09-02 PROCEDURE — 83615 LACTATE (LD) (LDH) ENZYME: CPT

## 2022-09-02 PROCEDURE — 83690 ASSAY OF LIPASE: CPT

## 2022-09-02 PROCEDURE — 99285 EMERGENCY DEPT VISIT HI MDM: CPT

## 2022-09-02 PROCEDURE — 87635 SARS-COV-2 COVID-19 AMP PRB: CPT

## 2022-09-02 RX ORDER — ERGOCALCIFEROL 1.25 MG/1
50000 CAPSULE ORAL WEEKLY
COMMUNITY

## 2022-09-02 RX ORDER — NIRMATRELVIR AND RITONAVIR 300-100 MG
3 KIT ORAL EVERY 12 HOURS
Status: ON HOLD | COMMUNITY
End: 2022-09-05 | Stop reason: HOSPADM

## 2022-09-02 RX ORDER — ACETAMINOPHEN 500 MG
1000 TABLET ORAL ONCE
Status: COMPLETED | OUTPATIENT
Start: 2022-09-02 | End: 2022-09-02

## 2022-09-02 RX ORDER — 0.9 % SODIUM CHLORIDE 0.9 %
500 INTRAVENOUS SOLUTION INTRAVENOUS ONCE
Status: COMPLETED | OUTPATIENT
Start: 2022-09-02 | End: 2022-09-02

## 2022-09-02 RX ORDER — DEXAMETHASONE SODIUM PHOSPHATE 10 MG/ML
10 INJECTION INTRAMUSCULAR; INTRAVENOUS ONCE
Status: COMPLETED | OUTPATIENT
Start: 2022-09-02 | End: 2022-09-02

## 2022-09-02 RX ORDER — POLYETHYLENE GLYCOL 3350 17 G/17G
17 POWDER, FOR SOLUTION ORAL DAILY PRN
COMMUNITY

## 2022-09-02 RX ORDER — BENZONATATE 100 MG/1
100 CAPSULE ORAL 3 TIMES DAILY PRN
COMMUNITY

## 2022-09-02 RX ADMIN — ACETAMINOPHEN 1000 MG: 500 TABLET ORAL at 16:07

## 2022-09-02 RX ADMIN — SODIUM CHLORIDE 500 ML: 9 INJECTION, SOLUTION INTRAVENOUS at 16:06

## 2022-09-02 RX ADMIN — DEXAMETHASONE SODIUM PHOSPHATE 10 MG: 10 INJECTION INTRAMUSCULAR; INTRAVENOUS at 18:56

## 2022-09-02 ASSESSMENT — PAIN SCALES - GENERAL
PAINLEVEL_OUTOF10: 7
PAINLEVEL_OUTOF10: 5

## 2022-09-02 ASSESSMENT — PAIN DESCRIPTION - DESCRIPTORS: DESCRIPTORS: ACHING

## 2022-09-02 ASSESSMENT — PAIN - FUNCTIONAL ASSESSMENT: PAIN_FUNCTIONAL_ASSESSMENT: NONE - DENIES PAIN

## 2022-09-02 ASSESSMENT — LIFESTYLE VARIABLES: HOW OFTEN DO YOU HAVE A DRINK CONTAINING ALCOHOL: NEVER

## 2022-09-02 NOTE — ED PROVIDER NOTES
Department of Emergency Medicine   ED Provider Note  Admit Date/RoomTime: 9/2/2022  3:09 PM  ED Room: 7844/7705-79          History of Present Illness:  9/2/22, Time: 3:19 PM EDT         Sumi Kevin is a 65 y.o. female with history of atrial fibrillation presenting to the ED for generalized weakness, multiple falls, beginning yesterday. The complaint has been persistent, moderate in severity, and worsened by light exertion. Patient states that yesterday she started developing some nasal congestion, dry cough and generalized weakness. She had a COVID test yesterday at home and it was positive. She is fully vaccinated x4, no history of COVID-19. She denies shortness of breath or lightheadedness or syncope. Denies chest pain. She has not had any fevers, but complains of generalized weakness. Yesterday she had a fall where she lost her balance trying to get up to her Rollator, did not hit her head at that time and was helped up by EMS. This morning she had a fall trying to get up from the toilet, fell and hit her head on the door of the bathroom, did not lose consciousness. She is on anticoagulation with Xarelto. She was able to stand up afterwards with help, denies hip pain or abdominal pain or nausea or vomiting. Denies vision changes or numbness or weakness or tingling. She endorses generalized weakness and fatigue. Review of Systems:     Pertinent positives and negatives are stated within HPI. 10 point ROS otherwise negative.    --------------------------------------------- PAST HISTORY ---------------------------------------------  Past Medical History:  has a past medical history of Arthritis, Atrial fibrillation ([address] GERD (gastroesophageal reflux disease), History of blood transfusion, MI, old, Renal insufficiency, and Thyroid disease. Past Surgical History:  has a past surgical history that includes pacemaker placement; joint replacement; fracture surgery;  Abdomen surgery; Gastric bypass surgery; Cholecystectomy; and Appendectomy. Social History:  reports that she has quit smoking. She has never used smokeless tobacco. She reports current alcohol use. She reports that she does not use drugs. Family History: family history is not on file. The patients home medications have been reviewed. Allergies: Hydromorphone, Ciprofloxacin, Codeine, Dilaudid [hydromorphone hcl], Hydrocodone-acetaminophen, Percodan [oxycodone-aspirin], and Vicodin [hydrocodone-acetaminophen]        ---------------------------------------------------PHYSICAL EXAM--------------------------------------    Constitutional/General: AAO to person/place/time/purpose, NAD, no labored breathing  Head: Normocephalic, small hematoma R parietal region. Eyes: EOMI, conjunctiva normal, sclera non icteric  Mouth: Moist mucous membranes, uvula midline  Neck: Supple, no stridor, no meningeal signs, no midline tenderness  Respiratory: Lungs clear to auscultation bilaterally, no wheezes, rales, or rhonchi. Not in respiratory distress  Cardiovascular:  Irregularly irregular, normal rate. No murmurs, no gallops, or rubs. 2+ distal pulses. Equal extremity pulses. Chest: No chest wall tenderness or deformity  GI:  Abdomen Soft, Non tender, Non distended. No rebound, guarding, or rigidity. No pulsatile masses. Multiple surgical scars. Musculoskeletal: Moves all extremities x 4. Warm and well perfused, no clubbing, cyanosis, or edema. Capillary refill <3 seconds  Integument: skin warm and dry. No rashes.    Neurologic: GCS 15, no focal deficits, symmetric strength 4/5 in the major muscle groups of upper and lower extremities bilaterally  Psychiatric: Normal Affect      -----------------------------------------PROCEDURES----------------------------------------------------      -------------------------------------------------- RESULTS -------------------------------------------------  I have personally reviewed all laboratory and imaging results for this patient. Results are listed below.      LABS:  Results for orders placed or performed during the hospital encounter of 09/02/22   COVID-19, Rapid    Specimen: Nasopharyngeal Swab   Result Value Ref Range    SARS-CoV-2, NAAT DETECTED (A) Not Detected   CBC with Auto Differential   Result Value Ref Range    WBC 4.5 4.5 - 11.5 E9/L    RBC 4.08 3.50 - 5.50 E12/L    Hemoglobin 9.5 (L) 11.5 - 15.5 g/dL    Hematocrit 32.2 (L) 34.0 - 48.0 %    MCV 78.9 (L) 80.0 - 99.9 fL    MCH 23.3 (L) 26.0 - 35.0 pg    MCHC 29.5 (L) 32.0 - 34.5 %    RDW 17.1 (H) 11.5 - 15.0 fL    Platelets 343 752 - 472 E9/L    MPV 11.8 7.0 - 12.0 fL    Neutrophils % 87.0 (H) 43.0 - 80.0 %    Lymphocytes % 2.6 (L) 20.0 - 42.0 %    Monocytes % 8.7 2.0 - 12.0 %    Eosinophils % 0.9 0.0 - 6.0 %    Basophils % 0.9 0.0 - 2.0 %    Neutrophils Absolute 3.92 1.80 - 7.30 E9/L    Lymphocytes Absolute 0.14 (L) 1.50 - 4.00 E9/L    Monocytes Absolute 0.40 0.10 - 0.95 E9/L    Eosinophils Absolute 0.04 (L) 0.05 - 0.50 E9/L    Basophils Absolute 0.04 0.00 - 0.20 E9/L    Anisocytosis 1+     Polychromasia 1+     Hypochromia 1+     Poikilocytes 1+     Ovalocytes 1+     Target Cells 1+    Comprehensive Metabolic Panel w/ Reflex to MG   Result Value Ref Range    Sodium 135 132 - 146 mmol/L    Potassium reflex Magnesium 4.4 3.5 - 5.0 mmol/L    Chloride 101 98 - 107 mmol/L    CO2 24 22 - 29 mmol/L    Anion Gap 10 7 - 16 mmol/L    Glucose 89 74 - 99 mg/dL    BUN 21 6 - 23 mg/dL    Creatinine 1.4 (H) 0.5 - 1.0 mg/dL    GFR Non-African American 36 >=60 mL/min/1.73    GFR African American 44     Calcium 9.2 8.6 - 10.2 mg/dL    Total Protein 6.9 6.4 - 8.3 g/dL    Albumin 3.9 3.5 - 5.2 g/dL    Total Bilirubin 0.7 0.0 - 1.2 mg/dL    Alkaline Phosphatase 116 (H) 35 - 104 U/L    ALT 12 0 - 32 U/L    AST 23 0 - 31 U/L   Lipase   Result Value Ref Range    Lipase 37 13 - 60 U/L   Troponin   Result Value Ref Range    Troponin, High Sensitivity 58 (H) 0 - 9 ng/L °C) (Bladder)   Resp 23   Wt 252 lb (114.3 kg)   SpO2 96%   BMI 37.21 kg/m²   Oxygen Saturation Interpretation: Abnormal and Improved after treatment    The patients available past medical records and past encounters were reviewed.         ------------------------------ ED COURSE/MEDICAL DECISION MAKING----------------------  Medications   glucose chewable tablet 16 g (has no administration in time range)   dextrose bolus 10% 125 mL (has no administration in time range)     Or   dextrose bolus 10% 250 mL (has no administration in time range)   glucagon (rDNA) injection 1 mg (has no administration in time range)   dextrose 10 % infusion (has no administration in time range)   sodium chloride flush 0.9 % injection 5-40 mL (has no administration in time range)   sodium chloride flush 0.9 % injection 5-40 mL (has no administration in time range)   0.9 % sodium chloride infusion (has no administration in time range)   polyethylene glycol (GLYCOLAX) packet 17 g (has no administration in time range)   acetaminophen (TYLENOL) tablet 650 mg (has no administration in time range)     Or   acetaminophen (TYLENOL) suppository 650 mg (has no administration in time range)   benzonatate (TESSALON) capsule 100 mg (has no administration in time range)   gabapentin (NEURONTIN) capsule 300 mg (has no administration in time range)   levothyroxine (SYNTHROID) tablet 50 mcg (has no administration in time range)   rivaroxaban (XARELTO) tablet 15 mg (has no administration in time range)   calcitRIOL (ROCALTROL) capsule 0.5 mcg (has no administration in time range)   cetirizine (ZYRTEC) tablet 10 mg (has no administration in time range)   hydrOXYzine HCl (ATARAX) tablet 10 mg (has no administration in time range)   sucralfate (CARAFATE) tablet 1 g (has no administration in time range)   acetaminophen (TYLENOL) tablet 1,000 mg (1,000 mg Oral Given 9/2/22 1607)   0.9 % sodium chloride bolus (0 mLs IntraVENous Stopped 9/2/22 5857) dexamethasone (DECADRON) injection 10 mg (10 mg IntraVENous Given 9/2/22 3800)            Medical Decision Making: This is a 67-year-old female presenting to the emerged department for generalized weakness, falls. Patient is on anticoagulation with Xarelto, has had decreased p.o. intake, multiple falls. She is COVID-19 positive yesterday. Given fall, head strike, CT imaging obtained with no evidence of intracranial process or acute fracture or dislocation of cervical spine. Patient found to be hypoxic with light exertion, pulse ox 82% on room air when ambulating to the commode. Patient was placed on 2 L of O2 via nasal cannula with improvement. She is fully vaccinated for COVID-19, COVID-19 positive here. Chest x-ray relatively clear. She is borderline febrile, given Tylenol. Given patient's decreased p.o. intake, Xarelto use, concern for possible pulmonary embolism, CT imaging was ordered but patient refused given her kidney function. Troponin here elevated from baseline but stable on repeat. Suspect acute hypoxic respiratory failure secondary to COVID-19, patient given Decadron, will be admitted for further work-up, treatment, monitoring. See ED COURSE for additional MDM. Labs & imaging reviewed and interpreted, see RESULTS above. Re-Evaluations:             ED Course as of 09/03/22 0031   Fri Sep 02, 2022   1557 EKG shows atrial fibrillation rate controlled at 95 with no acute ischemic changes. Normal conduction. Otherwise agree with resident. [NC]   1558 EKG: This EKG is signed and interpreted by me.     Rate: 95  Rhythm: Atrial fibrillation  Interpretation: no acute changes and atrial fibrillation (chronic)  Comparison: stable as compared to patient's most recent EKG   [RH]   1600 ATTENDING PROVIDER ATTESTATION:     I have personally performed and/or participated in the history, exam, medical decision making, and procedures and agree with all pertinent clinical information unless otherwise noted. I have also reviewed and agree with the past medical, family and social history unless otherwise noted. I have discussed this patient in detail with the resident, and provided the instruction and education regarding patient here stating she has COVID, is weak and fatigued and keeps slumping to the ground and unable to get up and walk or care for herself due to general fatigue and weakness. Has only had 2 cups of coffee in the last 2 days. No particular pain anywhere, no headache no stiff neck. No chest pain or palpitations or shortness of breath. No abdominal pain. No lightheadedness or syncope. No focal weakness. No treatment prior to arrival.  Exertion makes her worse. .  My findings/plan: Patient with dry lips, dry oral mucosa. Mild pharyngeal erythema and cobblestoning. No adenopathy or meningeal signs. Airway patent. Heart rate regular, lungs are clear and equal.  Abdomen soft and nontender. Arms and legs show no signs of acute bone or joint injury. No cervical, thoracic or lumbar spine tenderness. No sign of acute head or face injury. [NC]   [id number] Per nursing patient desatted to 82% spo2 on RA after ambulating to [facility]. Currently on 2 liters of o2 via NC.  [RH]   1832 Received call from CT, patient is refusing CTA of chest, she was told her kidney doctor not to receive IV contrast.  Patient understands risks of missing diagnosis such as pulmonary embolism, will discuss further with patient when she is back from CT. [RH]   1833 Discussed case with Dr. Kanchan Shane, agreed to admit patient.   [RH]      ED Course User Index  [NC] Britton Nguyen DO  [RH] Ryley Clancy Dam, DO         This patient's ED course included: a personal history and physicial examination, re-evaluation prior to disposition, multiple bedside re-evaluations, IV medications, cardiac monitoring, and continuous pulse oximetry    This patient has improved during their ED

## 2022-09-03 ENCOUNTER — APPOINTMENT (OUTPATIENT)
Dept: GENERAL RADIOLOGY | Age: 77
DRG: 137 | End: 2022-09-03
Payer: COMMERCIAL

## 2022-09-03 ENCOUNTER — APPOINTMENT (OUTPATIENT)
Dept: CT IMAGING | Age: 77
DRG: 137 | End: 2022-09-03
Payer: COMMERCIAL

## 2022-09-03 PROBLEM — U07.1 COVID-19: Status: ACTIVE | Noted: 2022-09-03

## 2022-09-03 LAB
ADENOVIRUS BY PCR: NOT DETECTED
ALBUMIN SERPL-MCNC: 4.1 G/DL (ref 3.5–5.2)
ALP BLD-CCNC: 118 U/L (ref 35–104)
ALT SERPL-CCNC: 13 U/L (ref 0–32)
ANION GAP SERPL CALCULATED.3IONS-SCNC: 13 MMOL/L (ref 7–16)
ANISOCYTOSIS: ABNORMAL
AST SERPL-CCNC: 22 U/L (ref 0–31)
BASOPHILS ABSOLUTE: 0 E9/L (ref 0–0.2)
BASOPHILS RELATIVE PERCENT: 0 % (ref 0–2)
BILIRUB SERPL-MCNC: 0.5 MG/DL (ref 0–1.2)
BORDETELLA PARAPERTUSSIS BY PCR: NOT DETECTED
BORDETELLA PERTUSSIS BY PCR: NOT DETECTED
BUN BLDV-MCNC: 30 MG/DL (ref 6–23)
C-REACTIVE PROTEIN: 1.9 MG/DL (ref 0–0.4)
C-REACTIVE PROTEIN: 2.1 MG/DL (ref 0–0.4)
CALCIUM SERPL-MCNC: 9.3 MG/DL (ref 8.6–10.2)
CHLAMYDOPHILIA PNEUMONIAE BY PCR: NOT DETECTED
CHLORIDE BLD-SCNC: 102 MMOL/L (ref 98–107)
CO2: 21 MMOL/L (ref 22–29)
CORONAVIRUS 229E BY PCR: NOT DETECTED
CORONAVIRUS HKU1 BY PCR: NOT DETECTED
CORONAVIRUS NL63 BY PCR: NOT DETECTED
CORONAVIRUS OC43 BY PCR: NOT DETECTED
CREAT SERPL-MCNC: 1.3 MG/DL (ref 0.5–1)
D DIMER: 550 NG/ML DDU
EOSINOPHILS ABSOLUTE: 0 E9/L (ref 0.05–0.5)
EOSINOPHILS RELATIVE PERCENT: 0 % (ref 0–6)
FERRITIN: 50 NG/ML
FOLATE: 13.4 NG/ML (ref 4.8–24.2)
GFR AFRICAN AMERICAN: 48
GFR NON-AFRICAN AMERICAN: 40 ML/MIN/1.73
GLUCOSE BLD-MCNC: 235 MG/DL (ref 74–99)
HCT VFR BLD CALC: 32.4 % (ref 34–48)
HEMOGLOBIN: 9.4 G/DL (ref 11.5–15.5)
HUMAN METAPNEUMOVIRUS BY PCR: NOT DETECTED
HUMAN RHINOVIRUS/ENTEROVIRUS BY PCR: NOT DETECTED
HYPOCHROMIA: ABNORMAL
INFLUENZA A BY PCR: NOT DETECTED
INFLUENZA B BY PCR: NOT DETECTED
IRON SATURATION: 8 % (ref 15–50)
IRON: 25 MCG/DL (ref 37–145)
LYMPHOCYTES ABSOLUTE: 0.22 E9/L (ref 1.5–4)
LYMPHOCYTES RELATIVE PERCENT: 10 % (ref 20–42)
MCH RBC QN AUTO: 23.8 PG (ref 26–35)
MCHC RBC AUTO-ENTMCNC: 29 % (ref 32–34.5)
MCV RBC AUTO: 82 FL (ref 80–99.9)
METER GLUCOSE: 224 MG/DL (ref 74–99)
MONOCYTES ABSOLUTE: 0.11 E9/L (ref 0.1–0.95)
MONOCYTES RELATIVE PERCENT: 5 % (ref 2–12)
MYCOPLASMA PNEUMONIAE BY PCR: NOT DETECTED
NEUTROPHILS ABSOLUTE: 1.87 E9/L (ref 1.8–7.3)
NEUTROPHILS RELATIVE PERCENT: 85 % (ref 43–80)
OVALOCYTES: ABNORMAL
PARAINFLUENZA VIRUS 1 BY PCR: NOT DETECTED
PARAINFLUENZA VIRUS 2 BY PCR: NOT DETECTED
PARAINFLUENZA VIRUS 3 BY PCR: NOT DETECTED
PARAINFLUENZA VIRUS 4 BY PCR: NOT DETECTED
PDW BLD-RTO: 17.2 FL (ref 11.5–15)
PLATELET # BLD: 152 E9/L (ref 130–450)
PMV BLD AUTO: 11.6 FL (ref 7–12)
POIKILOCYTES: ABNORMAL
POLYCHROMASIA: ABNORMAL
POTASSIUM SERPL-SCNC: 4.1 MMOL/L (ref 3.5–5)
PROCALCITONIN: 0.06 NG/ML (ref 0–0.08)
RBC # BLD: 3.95 E12/L (ref 3.5–5.5)
RESPIRATORY SYNCYTIAL VIRUS BY PCR: NOT DETECTED
SARS-COV-2, PCR: DETECTED
SODIUM BLD-SCNC: 136 MMOL/L (ref 132–146)
TOTAL CK: 94 U/L (ref 20–180)
TOTAL IRON BINDING CAPACITY: 323 MCG/DL (ref 250–450)
TOTAL PROTEIN: 7.2 G/DL (ref 6.4–8.3)
VITAMIN B-12: 504 PG/ML (ref 211–946)
VITAMIN D 25-HYDROXY: 51 NG/ML (ref 30–100)
WBC # BLD: 2.2 E9/L (ref 4.5–11.5)

## 2022-09-03 PROCEDURE — 82728 ASSAY OF FERRITIN: CPT

## 2022-09-03 PROCEDURE — XW033E5 INTRODUCTION OF REMDESIVIR ANTI-INFECTIVE INTO PERIPHERAL VEIN, PERCUTANEOUS APPROACH, NEW TECHNOLOGY GROUP 5: ICD-10-PCS | Performed by: INTERNAL MEDICINE

## 2022-09-03 PROCEDURE — 86140 C-REACTIVE PROTEIN: CPT

## 2022-09-03 PROCEDURE — 6360000002 HC RX W HCPCS: Performed by: INTERNAL MEDICINE

## 2022-09-03 PROCEDURE — 6360000002 HC RX W HCPCS: Performed by: CLINICAL NURSE SPECIALIST

## 2022-09-03 PROCEDURE — 82306 VITAMIN D 25 HYDROXY: CPT

## 2022-09-03 PROCEDURE — 6370000000 HC RX 637 (ALT 250 FOR IP): Performed by: INTERNAL MEDICINE

## 2022-09-03 PROCEDURE — 94640 AIRWAY INHALATION TREATMENT: CPT

## 2022-09-03 PROCEDURE — 87088 URINE BACTERIA CULTURE: CPT

## 2022-09-03 PROCEDURE — 6370000000 HC RX 637 (ALT 250 FOR IP): Performed by: NURSE PRACTITIONER

## 2022-09-03 PROCEDURE — 82607 VITAMIN B-12: CPT

## 2022-09-03 PROCEDURE — 71250 CT THORAX DX C-: CPT

## 2022-09-03 PROCEDURE — 0202U NFCT DS 22 TRGT SARS-COV-2: CPT

## 2022-09-03 PROCEDURE — 73502 X-RAY EXAM HIP UNI 2-3 VIEWS: CPT

## 2022-09-03 PROCEDURE — 82962 GLUCOSE BLOOD TEST: CPT

## 2022-09-03 PROCEDURE — 2580000003 HC RX 258: Performed by: INTERNAL MEDICINE

## 2022-09-03 PROCEDURE — 87186 SC STD MICRODIL/AGAR DIL: CPT

## 2022-09-03 PROCEDURE — 1200000000 HC SEMI PRIVATE

## 2022-09-03 PROCEDURE — 85025 COMPLETE CBC W/AUTO DIFF WBC: CPT

## 2022-09-03 PROCEDURE — 87081 CULTURE SCREEN ONLY: CPT

## 2022-09-03 PROCEDURE — 87077 CULTURE AEROBIC IDENTIFY: CPT

## 2022-09-03 PROCEDURE — 2700000000 HC OXYGEN THERAPY PER DAY

## 2022-09-03 PROCEDURE — 82746 ASSAY OF FOLIC ACID SERUM: CPT

## 2022-09-03 PROCEDURE — 83550 IRON BINDING TEST: CPT

## 2022-09-03 PROCEDURE — 80053 COMPREHEN METABOLIC PANEL: CPT

## 2022-09-03 PROCEDURE — 84145 PROCALCITONIN (PCT): CPT

## 2022-09-03 PROCEDURE — 36415 COLL VENOUS BLD VENIPUNCTURE: CPT

## 2022-09-03 PROCEDURE — 83540 ASSAY OF IRON: CPT

## 2022-09-03 PROCEDURE — 85378 FIBRIN DEGRADE SEMIQUANT: CPT

## 2022-09-03 PROCEDURE — 82550 ASSAY OF CK (CPK): CPT

## 2022-09-03 PROCEDURE — 2580000003 HC RX 258: Performed by: CLINICAL NURSE SPECIALIST

## 2022-09-03 PROCEDURE — 6370000000 HC RX 637 (ALT 250 FOR IP): Performed by: CLINICAL NURSE SPECIALIST

## 2022-09-03 RX ORDER — POLYETHYLENE GLYCOL 3350 17 G/17G
17 POWDER, FOR SOLUTION ORAL DAILY PRN
Status: DISCONTINUED | OUTPATIENT
Start: 2022-09-03 | End: 2022-09-05 | Stop reason: HOSPADM

## 2022-09-03 RX ORDER — ACETAMINOPHEN 650 MG/1
650 SUPPOSITORY RECTAL EVERY 6 HOURS PRN
Status: DISCONTINUED | OUTPATIENT
Start: 2022-09-03 | End: 2022-09-05 | Stop reason: HOSPADM

## 2022-09-03 RX ORDER — CEPHALEXIN 250 MG/1
250 CAPSULE ORAL DAILY
Status: DISCONTINUED | OUTPATIENT
Start: 2022-09-03 | End: 2022-09-05 | Stop reason: HOSPADM

## 2022-09-03 RX ORDER — ACETAMINOPHEN 325 MG/1
650 TABLET ORAL EVERY 6 HOURS PRN
Status: DISCONTINUED | OUTPATIENT
Start: 2022-09-03 | End: 2022-09-05 | Stop reason: HOSPADM

## 2022-09-03 RX ORDER — BENZONATATE 100 MG/1
100 CAPSULE ORAL 3 TIMES DAILY PRN
Status: DISCONTINUED | OUTPATIENT
Start: 2022-09-03 | End: 2022-09-05 | Stop reason: HOSPADM

## 2022-09-03 RX ORDER — DEXAMETHASONE SODIUM PHOSPHATE 10 MG/ML
6 INJECTION INTRAMUSCULAR; INTRAVENOUS EVERY 24 HOURS
Status: DISCONTINUED | OUTPATIENT
Start: 2022-09-03 | End: 2022-09-05 | Stop reason: HOSPADM

## 2022-09-03 RX ORDER — 0.9 % SODIUM CHLORIDE 0.9 %
30 INTRAVENOUS SOLUTION INTRAVENOUS PRN
Status: DISCONTINUED | OUTPATIENT
Start: 2022-09-03 | End: 2022-09-05 | Stop reason: HOSPADM

## 2022-09-03 RX ORDER — SODIUM CHLORIDE 0.9 % (FLUSH) 0.9 %
5-40 SYRINGE (ML) INJECTION EVERY 12 HOURS SCHEDULED
Status: DISCONTINUED | OUTPATIENT
Start: 2022-09-03 | End: 2022-09-05 | Stop reason: HOSPADM

## 2022-09-03 RX ORDER — ARFORMOTEROL TARTRATE 15 UG/2ML
15 SOLUTION RESPIRATORY (INHALATION) 2 TIMES DAILY
Status: DISCONTINUED | OUTPATIENT
Start: 2022-09-03 | End: 2022-09-05 | Stop reason: HOSPADM

## 2022-09-03 RX ORDER — BUDESONIDE 0.5 MG/2ML
0.5 INHALANT ORAL 2 TIMES DAILY
Status: DISCONTINUED | OUTPATIENT
Start: 2022-09-03 | End: 2022-09-05 | Stop reason: HOSPADM

## 2022-09-03 RX ORDER — TRAMADOL HYDROCHLORIDE 50 MG/1
50 TABLET ORAL EVERY 6 HOURS PRN
Status: DISCONTINUED | OUTPATIENT
Start: 2022-09-03 | End: 2022-09-05 | Stop reason: HOSPADM

## 2022-09-03 RX ORDER — DEXTROSE MONOHYDRATE 100 MG/ML
INJECTION, SOLUTION INTRAVENOUS CONTINUOUS PRN
Status: DISCONTINUED | OUTPATIENT
Start: 2022-09-03 | End: 2022-09-05 | Stop reason: HOSPADM

## 2022-09-03 RX ORDER — ASCORBIC ACID 500 MG
500 TABLET ORAL DAILY
Status: DISCONTINUED | OUTPATIENT
Start: 2022-09-03 | End: 2022-09-05 | Stop reason: HOSPADM

## 2022-09-03 RX ORDER — ZINC GLUCONATE 50 MG
50 TABLET ORAL DAILY
Status: DISCONTINUED | OUTPATIENT
Start: 2022-09-03 | End: 2022-09-05 | Stop reason: HOSPADM

## 2022-09-03 RX ORDER — SODIUM CHLORIDE 9 MG/ML
INJECTION, SOLUTION INTRAVENOUS PRN
Status: DISCONTINUED | OUTPATIENT
Start: 2022-09-03 | End: 2022-09-05 | Stop reason: HOSPADM

## 2022-09-03 RX ORDER — SODIUM CHLORIDE 0.9 % (FLUSH) 0.9 %
5-40 SYRINGE (ML) INJECTION PRN
Status: DISCONTINUED | OUTPATIENT
Start: 2022-09-03 | End: 2022-09-05 | Stop reason: HOSPADM

## 2022-09-03 RX ORDER — ENOXAPARIN SODIUM 100 MG/ML
30 INJECTION SUBCUTANEOUS 2 TIMES DAILY
Status: DISCONTINUED | OUTPATIENT
Start: 2022-09-03 | End: 2022-09-03

## 2022-09-03 RX ORDER — IPRATROPIUM BROMIDE AND ALBUTEROL SULFATE 2.5; .5 MG/3ML; MG/3ML
1 SOLUTION RESPIRATORY (INHALATION)
Status: DISCONTINUED | OUTPATIENT
Start: 2022-09-03 | End: 2022-09-05 | Stop reason: HOSPADM

## 2022-09-03 RX ORDER — AZITHROMYCIN 250 MG/1
500 TABLET, FILM COATED ORAL DAILY
Status: DISCONTINUED | OUTPATIENT
Start: 2022-09-03 | End: 2022-09-05 | Stop reason: HOSPADM

## 2022-09-03 RX ORDER — CETIRIZINE HYDROCHLORIDE 10 MG/1
10 TABLET ORAL DAILY
Status: DISCONTINUED | OUTPATIENT
Start: 2022-09-03 | End: 2022-09-05 | Stop reason: HOSPADM

## 2022-09-03 RX ORDER — SUCRALFATE 1 G/1
1 TABLET ORAL 2 TIMES DAILY
Status: DISCONTINUED | OUTPATIENT
Start: 2022-09-03 | End: 2022-09-05 | Stop reason: HOSPADM

## 2022-09-03 RX ORDER — GABAPENTIN 300 MG/1
300 CAPSULE ORAL 3 TIMES DAILY
Status: DISCONTINUED | OUTPATIENT
Start: 2022-09-03 | End: 2022-09-05 | Stop reason: HOSPADM

## 2022-09-03 RX ORDER — LEVOTHYROXINE SODIUM 0.05 MG/1
50 TABLET ORAL DAILY
Status: DISCONTINUED | OUTPATIENT
Start: 2022-09-03 | End: 2022-09-05 | Stop reason: HOSPADM

## 2022-09-03 RX ORDER — LANOLIN ALCOHOL/MO/W.PET/CERES
50 CREAM (GRAM) TOPICAL DAILY
Status: DISCONTINUED | OUTPATIENT
Start: 2022-09-03 | End: 2022-09-05 | Stop reason: HOSPADM

## 2022-09-03 RX ORDER — HYDROXYZINE HYDROCHLORIDE 10 MG/1
10 TABLET, FILM COATED ORAL DAILY
Status: DISCONTINUED | OUTPATIENT
Start: 2022-09-03 | End: 2022-09-05 | Stop reason: HOSPADM

## 2022-09-03 RX ORDER — CALCITRIOL 0.25 UG/1
0.5 CAPSULE, LIQUID FILLED ORAL DAILY
Status: DISCONTINUED | OUTPATIENT
Start: 2022-09-03 | End: 2022-09-05 | Stop reason: HOSPADM

## 2022-09-03 RX ADMIN — CETIRIZINE HYDROCHLORIDE 10 MG: 10 TABLET, FILM COATED ORAL at 08:55

## 2022-09-03 RX ADMIN — SUCRALFATE 1 G: 1 TABLET ORAL at 20:02

## 2022-09-03 RX ADMIN — ARFORMOTEROL TARTRATE 15 MCG: 15 SOLUTION RESPIRATORY (INHALATION) at 18:25

## 2022-09-03 RX ADMIN — BUDESONIDE 500 MCG: 0.5 INHALANT RESPIRATORY (INHALATION) at 10:48

## 2022-09-03 RX ADMIN — ARFORMOTEROL TARTRATE 15 MCG: 15 SOLUTION RESPIRATORY (INHALATION) at 10:48

## 2022-09-03 RX ADMIN — AZITHROMYCIN MONOHYDRATE 500 MG: 250 TABLET ORAL at 14:17

## 2022-09-03 RX ADMIN — Medication 10 ML: at 08:56

## 2022-09-03 RX ADMIN — CALCITRIOL 0.5 MCG: 0.25 CAPSULE ORAL at 08:55

## 2022-09-03 RX ADMIN — GABAPENTIN 300 MG: 300 CAPSULE ORAL at 08:55

## 2022-09-03 RX ADMIN — Medication 10 ML: at 20:03

## 2022-09-03 RX ADMIN — DEXAMETHASONE SODIUM PHOSPHATE 6 MG: 10 INJECTION INTRAMUSCULAR; INTRAVENOUS at 14:18

## 2022-09-03 RX ADMIN — Medication 50 MG: at 14:17

## 2022-09-03 RX ADMIN — HYDROXYZINE HYDROCHLORIDE 10 MG: 10 TABLET ORAL at 08:54

## 2022-09-03 RX ADMIN — REMDESIVIR 200 MG: 100 INJECTION, POWDER, LYOPHILIZED, FOR SOLUTION INTRAVENOUS at 18:49

## 2022-09-03 RX ADMIN — CEPHALEXIN 250 MG: 250 CAPSULE ORAL at 17:19

## 2022-09-03 RX ADMIN — SUCRALFATE 1 G: 1 TABLET ORAL at 08:55

## 2022-09-03 RX ADMIN — RIVAROXABAN 15 MG: 15 TABLET, FILM COATED ORAL at 08:55

## 2022-09-03 RX ADMIN — GABAPENTIN 300 MG: 300 CAPSULE ORAL at 20:02

## 2022-09-03 RX ADMIN — GABAPENTIN 300 MG: 300 CAPSULE ORAL at 14:17

## 2022-09-03 RX ADMIN — PYRIDOXINE HCL TAB 50 MG 50 MG: 50 TAB at 14:17

## 2022-09-03 RX ADMIN — BUDESONIDE 500 MCG: 0.5 INHALANT RESPIRATORY (INHALATION) at 18:25

## 2022-09-03 RX ADMIN — IPRATROPIUM BROMIDE AND ALBUTEROL SULFATE 1 AMPULE: .5; 2.5 SOLUTION RESPIRATORY (INHALATION) at 18:25

## 2022-09-03 RX ADMIN — IPRATROPIUM BROMIDE AND ALBUTEROL SULFATE 1 AMPULE: .5; 2.5 SOLUTION RESPIRATORY (INHALATION) at 10:48

## 2022-09-03 RX ADMIN — OXYCODONE HYDROCHLORIDE AND ACETAMINOPHEN 500 MG: 500 TABLET ORAL at 14:17

## 2022-09-03 RX ADMIN — LEVOTHYROXINE SODIUM 50 MCG: 0.05 TABLET ORAL at 06:48

## 2022-09-03 NOTE — PROGRESS NOTES
Internal Medicine Progress Note    [facility]    Rubia Pichardo, LEONCIO.CYNOMichaelleI. Rahat Mendez D.O., NAOMORRIS Montilla D.O. Addie Peter, MSN, APRN, NP-C  Amanda Ellis. Giorgio Vick, MSN, APRN-CNP     Primary Care Physician: Armani Munoz MD   Admitting Physician:  Roosevelt Griffin DO  Admission date and time: 9/2/2022  3:09 PM    Room:  [address] [place] [address]  Admitting diagnosis: Generalized weakness [R53.1]  Anticoagulated [Z79.01]  Acute respiratory failure with hypoxia ([facility] [address] [J96.01]  Closed head injury, initial encounter [S09.90XA]  Fall, initial encounter [W19. XXXA]  Anemia, unspecified type [D64.9]  Acute hypoxemic respiratory failure due to COVID-19 ([facility] [address] [U07.1, J96.01]  COVID-19 [U07.1]    Patient Name: Jeffery Brown  MRN: [id number]    Date of Service: 9/3/2022     Subjective:  Stacia Zamorano is a 65 y.o. female who was seen and examined today,9/3/2022, at the bedside. Patient does admit to shortness of breath with exertion. Patient also complains of weakness. States she did have 2 falls this past week. States on one instance-second fall that she did fall backward hitting her head and has a very sore area on the back/top area. States she was informed that she does have bruising in that area. Patient states that she did do a home COVID test and called and reported this to her family physician. States that she was started on Paxil Satnam and questions if she was having some sort of a allergic reaction to this causing weakness. Patient denies fevers but states she is chilled all the time which is not new for her. No family present during my examination. Review of System:   Constitutional:   Denies fever or chills, weight loss or gain, positive for fatigue/malaise. HEENT:   Denies ear pain, sore throat, sinus or eye problems. Cardiovascular:   Denies any chest pain, irregular heartbeats, or palpitations. Respiratory:   Admits to shortness of breath, coughing, no sputum production, hemoptysis, or wheezing. Gastrointestinal:   Denies nausea, vomiting, diarrhea, or constipation. Denies any abdominal pain. Patient reports she has chronic bowel incontinence. Genitourinary:    Denies any urgency, frequency, hematuria. Voiding  without difficulty. Patient mitts to chronic urinary incontinence. Extremities:   Patient mitts to chronic swelling of lower extremities. Neurology:    Denies any headache or focal neurological deficits, admits to generalized weakness. Psch:   Denies being anxious or depressed. Musculoskeletal:    Denies  myalgias, joint complaints. Patient is a chronic back pain. History of bilateral knee surgery. States primary care physician does prescribe tramadol for her which she takes very sparingly. Left hip pain status post fall  Integumentary:   Denies any rashes, ulcers, or excoriations. Denies bruising. Hematologic/Lymphatic:  Denies bruising or bleeding. Physical Exam:  I/O this shift: In: 580 [P.O.:580]  Out: 1000 [Urine:1000]    Intake/Output Summary (Last 24 hours) at 9/3/2022 1607  Last data filed at 9/3/2022 1414  Gross per 24 hour   Intake 580 ml   Output 1000 ml   Net -420 ml   No intake/output data recorded. Patient Vitals for the past 96 hrs (Last 3 readings):   Weight   09/03/22 0045 252 lb (114.3 kg)   09/02/22 1518 252 lb (114.3 kg)     Vital Signs:   Blood pressure 133/81, pulse 74, temperature 97.9 °F (36.6 °C), temperature source Oral, resp. rate 20, height 5' 9\" (1.753 m), weight 252 lb (114.3 kg), SpO2 97 %, not currently breastfeeding. General appearance:  Alert, responsive, oriented to person, place, and time. Well preserved, alert, no distress. Head:  Normocephalic. No masses, lesions or tenderness. Eyes:  PERRLA. EOMI. Sclera clear. Impaired vision. Eyeglasses on. ENT:  Ears normal. Mucosa normal.  Oxygen via nasal cannula. Neck:    Supple.  Trachea midline. No thyromegaly. No JVD. No bruits. Heart:    Rhythm regular. Rate controlled. No murmurs. Lungs:    Symmetrical. Clear to auscultation bilaterally. No wheezes. No rhonchi. No rales. Abdomen:   Soft. Non-tender. Obese. Non-distended. Bowel sounds positive. No organomegaly or masses. No pain on palpation. Extremities:    Peripheral pulses present. No peripheral edema. No ulcers. No cyanosis. No clubbing. Surgical scars noted of the bilateral knees. Neurologic:    Alert x 3. No focal deficit. Cranial nerves grossly intact. No focal weakness. Psych:   Behavior is normal. Mood appears normal. Speech is not rapid and/or pressured. Musculoskeletal:   Spine ROM normal. Muscular strength intact. Gait not assessed. Integumentary:  Bruising noted posterior scalp.   Genitalia/Breast:  Deferred  Medication:  Scheduled Meds:   sodium chloride flush  5-40 mL IntraVENous 2 times per day    gabapentin  300 mg Oral TID    levothyroxine  50 mcg Oral Daily    rivaroxaban  15 mg Oral Daily with breakfast    calcitRIOL  0.5 mcg Oral Daily    cetirizine  10 mg Oral Daily    hydrOXYzine HCl  10 mg Oral Daily    sucralfate  1 g Oral BID    dexamethasone  6 mg IntraVENous Q24H    azithromycin  500 mg Oral Daily    Arformoterol Tartrate  15 mcg Nebulization BID    budesonide  0.5 mg Nebulization BID    ipratropium-albuterol  1 ampule Inhalation Q4H WA    zinc gluconate  50 mg Oral Daily    ascorbic acid  500 mg Oral Daily    vitamin B-6  50 mg Oral Daily    cephALEXin  250 mg Oral Daily     Continuous Infusions:   dextrose      sodium chloride         Objective Data:  Recent Labs     09/02/22  1610   WBC 4.5   RBC 4.08   HGB 9.5*   HCT 32.2*   MCV 78.9*   MCH 23.3*   MCHC 29.5*   RDW 17.1*      MPV 11.8     Recent Labs     09/02/22  1610      K 4.4      CO2 24   BUN 21   CREATININE 1.4*   GLUCOSE 89   CALCIUM 9.2   PROT 6.9   LABALBU 3.9   BILITOT 0.7   ALKPHOS 116*   AST 23   ALT 12     Lab Results   Component Value Date    TROPONINI 0.02 03/02/2021    TROPONINI <0.01 09/03/2018            Assessment:  COVID-19  Acute hypoxic respiratory failure  Mechanical fall x2  Acute on chronic anemia  Elevated troponin  Chronic normocytic anemia  Arthritis  Atrial fibrillation  GERD  History of MI  Hypothyroidism  Bowel and bladder incontinence  History of gastric bypass  Pacemaker in situ  tobacco abuse next  Chronic kidney disease    Plan: Will consult infectious disease. Will add tramadol for chronic pain syndrome. Obtain x-ray of the left hip for further evaluation status post fall. Supportive oxygen therapy to maintain O2 saturation above 90%-wean/titrate as tolerated  Continue Dexamethasone   Monitor inflammatory markers    Anticoagulation: Continue Xarelto. Antibiotic regimen: As per infectious disease  Activity as tolerated. PT/OT to evaluate and treat. Incentive spirometry as instructed. Sit up in chair as often as tolerated and prone as often as tolerated but at least every 2 hours. Continue current respiratory treatments. Maximum COVID protocol is being undertaken  Monitor blood glucose levels and treat accordingly  We will continue to address underlying comorbidities during the hospitalization. Continue current therapy. See orders for further plan of care. More than 50% of my  time was spent at the bedside counseling/coordinating care with the patient and/or family with face to face contact. This time was spent reviewing notes and laboratory data as well as instructing and counseling the patient. Time I spent with the family or surrogate(s) is included only if the patient was incapable of providing the necessary information or participating in medical decisions. I also discussed the differential diagnosis and all of the proposed management plans with the patient and individuals accompanying the patient. The patient was seen, examined and then discussed with Dr. Stephanie Allan. Janessa Dawkins, SONIA - CNP,  9/3/2022  4:07 PM        I saw and evaluated the patient. I agree with the findings and the plan of care as documented in Janessa Dawkins NP-C's  note.     Estelita Dumont DO, D.O., [facility]  4:33 PM  9/3/2022

## 2022-09-03 NOTE — PROGRESS NOTES
Pharmacy Consultation Note    Consult date: 9/3/2022  Physician/provider: SONIA Brandt  Pharmacy has been consulted to evaluate criteria for Remdesivir therapy. Based on the criteria, the patient DOES currently meet [facility] approved Covid-19 treatment criteria for Remdesivir.       Thank you for the consult,    Jonna Duran, PharmD 9/3/2022 5:05 PM   [phone]

## 2022-09-03 NOTE — PLAN OF CARE
Problem: Pain  Goal: Verbalizes/displays adequate comfort level or baseline comfort level  Outcome: Progressing     Problem: Safety - Adult  Goal: Free from fall injury  Outcome: Progressing     Problem: Chronic Conditions and Co-morbidities  Goal: Patient's chronic conditions and co-morbidity symptoms are monitored and maintained or improved  Outcome: Progressing     Problem: Cardiovascular - Adult  Goal: Maintains optimal cardiac output and hemodynamic stability  Outcome: Progressing  Goal: Absence of cardiac dysrhythmias or at baseline  Outcome: Progressing     Problem: Skin/Tissue Integrity - Adult  Goal: Skin integrity remains intact  Outcome: Progressing

## 2022-09-03 NOTE — H&P
Department of Internal Medicine  History and Physical    PCP: Karthikeyan Segovia MD  Admitting Physician: Dr. Evonne Burgos   Consultants:   Date of Service: 9/2/2022    CHIEF COMPLAINT:  falls    HISTORY OF PRESENT ILLNESS:    Patient is a 71-year-old female presented to ED due to multiple falls. Patient states she has been falling but denies any weakness. She denies passing out. States that she has been coughing and having sputum production. She denies any fever or chills. She denies any chest pain. She denies any lightheadedness or dizziness    PAST MEDICAL Hx:  Past Medical History:   Diagnosis Date    Arthritis     Atrial fibrillation (HCC)     GERD (gastroesophageal reflux disease)     History of blood transfusion     MI, old     Renal insufficiency     Thyroid disease        PAST SURGICAL Hx:   Past Surgical History:   Procedure Laterality Date    ABDOMEN SURGERY      APPENDECTOMY      CHOLECYSTECTOMY      FRACTURE SURGERY      GASTRIC BYPASS SURGERY      JOINT REPLACEMENT      knee    PACEMAKER PLACEMENT         FAMILY Hx:  History reviewed. No pertinent family history. HOME MEDICATIONS:  Prior to Admission medications    Medication Sig Start Date End Date Taking? Authorizing Provider   benzonatate (TESSALON) 100 MG capsule Take 100 mg by mouth 3 times daily as needed for Cough   Yes Historical Provider, MD   nirmatrelvir/ritonavir (PAXLOVID, 300/100,) 20 x 150 MG & 10 x 100MG TBPK Take 3 tablets by mouth every 12 hours Take 3 tablets (two 150 mg nirmatrelvir and one 100 mg ritonavir tablets) by mouth every 12 hours for 5 days.    Yes Historical Provider, MD   polyethylene glycol (GLYCOLAX) 17 GM/SCOOP powder Take 17 g by mouth daily as needed (Constipation)   Yes Historical Provider, MD   vitamin D (ERGOCALCIFEROL) 1.25 MG (22707 UT) CAPS capsule Take 50,000 Units by mouth once a week Sunday   Yes Historical Provider, MD   cetirizine (ZYRTEC) 10 MG tablet Take 10 mg by mouth daily    Historical Provider, MD   hydrOXYzine (ATARAX) 10 MG tablet Take 10 mg by mouth daily    Historical Provider, MD   gabapentin (NEURONTIN) 300 MG capsule Take 300 mg by mouth 3 times daily. Historical Provider, MD   cephALEXin (KEFLEX) 250 MG capsule Take 250 mg by mouth daily    Historical Provider, MD   rivaroxaban (XARELTO) 15 MG TABS tablet Take 15 mg by mouth daily (with breakfast)    Historical Provider, MD   omeprazole (PRILOSEC) 20 MG capsule Take 20 mg by mouth Daily    Historical Provider, MD   docusate sodium (COLACE) 100 MG capsule Take 100 mg by mouth 2 times daily as needed for Constipation    Historical Provider, MD   sucralfate (CARAFATE) 1 GM tablet Take 1 g by mouth 2 times daily     Historical Provider, MD   calcitRIOL (ROCALTROL) 0.5 MCG capsule Take 0.5 mcg by mouth daily. Historical Provider, MD   levothyroxine (SYNTHROID) 50 MCG tablet Take 50 mcg by mouth Daily.     Historical Provider, MD       ALLERGIES:  Hydromorphone, Ciprofloxacin, Codeine, Dilaudid [hydromorphone hcl], Hydrocodone-acetaminophen, Percodan [oxycodone-aspirin], and Vicodin [hydrocodone-acetaminophen]    SOCIAL Hx:  Social History     Socioeconomic History    Marital status:      Spouse name: Not on file    Number of children: Not on file    Years of education: Not on file    Highest education level: Not on file   Occupational History    Not on file   Tobacco Use    Smoking status: Former    Smokeless tobacco: Never   Substance and Sexual Activity    Alcohol use: Yes     Comment: occasional    Drug use: No    Sexual activity: Never   Other Topics Concern    Not on file   Social History Narrative    Not on file     Social Determinants of Health     Financial Resource Strain: Not on file   Food Insecurity: Not on file   Transportation Needs: Not on file   Physical Activity: Not on file   Stress: Not on file   Social Connections: Not on file   Intimate Partner Violence: Not on file   Housing Stability: Not on file       ROS: Positive in bold  General:   Denies chills, fatigue, fever, malaise, night sweats or weight loss    Psychological:   Denies anxiety, disorientation or hallucinations    ENT:    Denies epistaxis, headaches, vertigo or visual changes    Cardiovascular:   Denies any chest pain, irregular heartbeats, or palpitations. No paroxysmal nocturnal dyspnea. Respiratory:   Denies shortness of breath, coughing, sputum production, hemoptysis, or wheezing. No orthopnea. Gastrointestinal:   Denies nausea, vomiting, diarrhea, or constipation. Denies any abdominal pain. Denies change in bowel habits or stools. Genito-Urinary:    Denies any urgency, frequency, hematuria. Voiding without difficulty. Musculoskeletal:   Denies joint pain, joint stiffness, joint swelling or muscle pain    Neurology:    Denies any headache or focal neurological deficits. No weakness or paresthesia. Derm:    Denies any rashes, ulcers, or excoriations. Denies bruising. Extremities:   Denies any lower extremity swelling or edema. PHYSICAL EXAM: Abnormal findings noted  VITALS:  Vitals:    09/02/22 2040   BP:    Pulse:    Resp:    Temp: 99.3 °F (37.4 °C)   SpO2:          CONSTITUTIONAL:    Awake, alert, cooperative, no apparent distress, and appears stated age    EYES:    EOMI, sclera clear, conjunctiva normal    ENT:    Normocephalic, atraumatic,  External ears without lesions. NECK:    Supple, symmetrical, trachea midline,  no JVD    HEMATOLOGIC/LYMPHATICS:    No cervical lymphadenopathy and no supraclavicular lymphadenopathy    LUNGS:    Symmetric. No increased work of breathing, good air exchange, clear to auscultation bilaterally, no wheezes, rhonchi, or rales,     CARDIOVASCULAR:    Normal apical impulse, regular rate and rhythm, normal S1 and S2, no S3 or S4, and no murmur noted    ABDOMEN:    soft, non-distended, non-tender    MUSCULOSKELETAL:    There is no redness, warmth, or swelling of the joints.       NEUROLOGIC:

## 2022-09-03 NOTE — CONSULTS
[address] [place] [address]  Phone [phone]   [phone]      Admit Date: 2022  3:09 PM  Pt Name: Aurora Handley  MRN: [id number]  : 1945  Reason for Consult:    Chief Complaint   Patient presents with    Fall     Pt fell twice, last night and today, hematoma to back of head, denies CP/SOB, hip replacement in may, feels weak on feet, covid + yesterday     Requesting Physician:  Terrence Jarrett DO  PCP: Damion Davis MD  History Obtained From:  patient, chart   ID consulted for Generalized weakness [R53.1]  Anticoagulated [Z79.01]  Acute respiratory failure with hypoxia ([address] [J96.01]  Closed head injury, initial encounter [E34.73OG]  Fall, initial encounter [W19. XXXA]  Anemia, unspecified type [D64.9]  Acute hypoxemic respiratory failure due to COVID-19 ([address] [U07.1, J96.01]  COVID-19 [U07.1]  on hospital day 1  CHIEF COMPLAINT       Chief Complaint   Patient presents with    Fall     Pt fell twice, last night and today, hematoma to back of head, denies CP/SOB, hip replacement in may, feels weak on feet, covid + yesterday     HISTORYOF PRESENT ILLNESS   Aurora Handley is a 65 y.o. female who presents with   has a past medical history of Arthritis, Atrial fibrillation ([address] GERD (gastroesophageal reflux disease), History of blood transfusion, MI, old, Renal insufficiency, and Thyroid disease. ED TRIAGEVITALS  BP: 133/81, Temp: 97.9 °F (36.6 °C), Heart Rate: 74, Resp: 20, SpO2: 97 %  HPI  Pt presented to ER on 2022 with diagnosis of  Generalized weakness [R53.1]  Anticoagulated [Z79.01]  Acute respiratory failure with hypoxia (HCC) [J96.01]  Closed head injury, initial encounter [N71.73LR]  Fall, initial encounter [W19. XXXA]  Anemia, unspecified type [D64.9]  Acute hypoxemic respiratory failure due to COVID-19 ([address] [U07.1, J96.01]  COVID-19 [U07.1]  ID was consulted on 22 for COVID    Patient was admitted for weakness and s/p fall.   She reports she started not feeling well this past Wednesday with headache and weakness. She then woke up on Thursday feeling worse and self tested and came to hospital with weakness and increasing shortness of breath. Patient was on Paxlovid for 2 days prior to admission- she feels she and a reaction to this and it made her weak. She is currently on 2 liters nasal canula. She denies any fevers or chills at home. She is fatigued. She is on azithromycin. ID has been asked to evaluate. REVIEW OF SYSTEMS     CONSTITUTIONAL:   No fever, chills, weight loss  ALLERGIES:    No rash or itch  EYES:     No visual changes  ENT:      No  hearing loss or sore throat  CARDIOVASCULAR:   No chest pain or palpitations  RESPIRATORY:   ++ cough, ++ sob  ENDOCRINE:    No increase thirst, urination   HEME-LYMPH:   No easy bruising or bleeding  GI:     No nausea, vomiting or diarrhea  :     No urinary complaints  NEURO:    No seizures, stroke, HA  MUSCULOSKELETAL:  ++ muscle weakness   SKIN:     No rash ulcers or wounds  PSYCH:    No depression or anxiety    Medications Prior to Admission: benzonatate (TESSALON) 100 MG capsule, Take 100 mg by mouth 3 times daily as needed for Cough  nirmatrelvir/ritonavir (PAXLOVID, 300/100,) 20 x 150 MG & 10 x 100MG TBPK, Take 3 tablets by mouth every 12 hours Take 3 tablets (two 150 mg nirmatrelvir and one 100 mg ritonavir tablets) by mouth every 12 hours for 5 days. (Patient not taking: Reported on 9/3/2022)  polyethylene glycol (GLYCOLAX) 17 GM/SCOOP powder, Take 17 g by mouth daily as needed (Constipation)  vitamin D (ERGOCALCIFEROL) 1.25 MG (78603 UT) CAPS capsule, Take 50,000 Units by mouth once a week Sunday  cetirizine (ZYRTEC) 10 MG tablet, Take 10 mg by mouth daily  hydrOXYzine (ATARAX) 10 MG tablet, Take 10 mg by mouth daily  gabapentin (NEURONTIN) 300 MG capsule, Take 300 mg by mouth 3 times daily.   cephALEXin (KEFLEX) 250 MG capsule, Take 250 mg by mouth daily  rivaroxaban (XARELTO) 15 MG TABS tablet, Take 15 mg by mouth daily (with breakfast)  omeprazole (PRILOSEC) 20 MG capsule, Take 20 mg by mouth Daily  docusate sodium (COLACE) 100 MG capsule, Take 100 mg by mouth 2 times daily as needed for Constipation  sucralfate (CARAFATE) 1 GM tablet, Take 1 g by mouth 2 times daily   calcitRIOL (ROCALTROL) 0.5 MCG capsule, Take 0.5 mcg by mouth daily. levothyroxine (SYNTHROID) 50 MCG tablet, Take 50 mcg by mouth Daily.   CURRENT MEDICATIONS     Current Facility-Administered Medications:     glucose chewable tablet 16 g, 4 tablet, Oral, PRN, Ismail U Carey, DO    dextrose bolus 10% 125 mL, 125 mL, IntraVENous, PRN **OR** dextrose bolus 10% 250 mL, 250 mL, IntraVENous, PRN, Ismail U Carey, DO    glucagon (rDNA) injection 1 mg, 1 mg, SubCUTAneous, PRN, Ismail U Carey, DO    dextrose 10 % infusion, , IntraVENous, Continuous PRN, Ismail U Carey, DO    sodium chloride flush 0.9 % injection 5-40 mL, 5-40 mL, IntraVENous, 2 times per day, [employer], DO, 10 mL at 09/03/22 0856    sodium chloride flush 0.9 % injection 5-40 mL, 5-40 mL, IntraVENous, PRN, [employer], DO    0.9 % sodium chloride infusion, , IntraVENous, PRN, Ismail U Carey, DO    polyethylene glycol (GLYCOLAX) packet 17 g, 17 g, Oral, Daily PRN, [employer], DO    acetaminophen (TYLENOL) tablet 650 mg, 650 mg, Oral, Q6H PRN **OR** acetaminophen (TYLENOL) suppository 650 mg, 650 mg, Rectal, Q6H PRN, [employer], DO    benzonatate (TESSALON) capsule 100 mg, 100 mg, Oral, TID PRN, [employer], DO    gabapentin (NEURONTIN) capsule 300 mg, 300 mg, Oral, TID, Ismail U Carey, DO, 300 mg at 09/03/22 0855    levothyroxine (SYNTHROID) tablet 50 mcg, 50 mcg, Oral, Daily, [employer], DO, 50 mcg at 09/03/22 4770    rivaroxaban (XARELTO) tablet 15 mg, 15 mg, Oral, Daily with breakfast, [employer], DO, 15 mg at 09/03/22 9929    calcitRIOL (ROCALTROL) capsule 0.5 mcg, 0.5 mcg, Oral, Daily, Ismail U Carey, DO, 0.5 mcg at 09/03/22 0855    cetirizine (ZYRTEC) tablet 10 mg, 10 mg, Oral, Daily, [employer], DO, 10 mg at 09/03/22 2239    hydrOXYzine HCl (ATARAX) tablet 10 mg, 10 mg, Oral, Daily, [employer], DO, 10 mg at 09/03/22 0854    sucralfate (CARAFATE) tablet 1 g, 1 g, Oral, BID, Ismail U Carey, DO, 1 g at 09/03/22 0855    dexamethasone (DECADRON) injection 6 mg, 6 mg, IntraVENous, Q24H, Ismail U Carey, DO    azithromycin (ZITHROMAX) tablet 500 mg, 500 mg, Oral, Daily, Reji Oretga DO    Arformoterol Tartrate (BROVANA) nebulizer solution 15 mcg, 15 mcg, Nebulization, BID, Reji Ortega DO    budesonide (PULMICORT) nebulizer suspension 500 mcg, 0.5 mg, Nebulization, BID, Reji Ortega DO    ipratropium-albuterol (DUONEB) nebulizer solution 1 ampule, 1 ampule, Inhalation, Q4H WA, Reji Ortega DO    traMADol (ULTRAM) tablet 50 mg, 50 mg, Oral, Q6H PRN, Reji Ortega DO  ALLERGIES     Hydromorphone, Ciprofloxacin, Codeine, Dilaudid [hydromorphone hcl], Hydrocodone-acetaminophen, Percodan [oxycodone-aspirin], and Vicodin [hydrocodone-acetaminophen]  Immunization History   Administered Date(s) Administered    COVID-19, PFIZER PURPLE top, DILUTE for use, (age 15 y+), 30mcg/0.3mL 01/26/2021, 02/16/2021, 10/21/2021    Td (Adult), 5 Lf Tetanus Toxoid, Pf (Tenivac, Decavac) 11/03/2021      Internal Administration   First Dose COVID-19, PFIZER PURPLE top, DILUTE for use, (age 15 y+), 30mcg/0.3mL  01/26/2021   Second Dose COVID-19, PFIZER PURPLE top, DILUTE for use, (age 15 y+), 30mcg/0.3mL   02/16/2021       Last COVID Lab SARS-CoV-2 (no units)   Date Value   12/03/2020 Not Detected     SARS-CoV-2, NAAT (no units)   Date Value   09/02/2022 DETECTED (A)          PAST MEDICAL HISTORY     Past Medical History:   Diagnosis Date    Arthritis     Atrial fibrillation (HCC)     GERD (gastroesophageal reflux disease)     History of blood transfusion     MI, old     Renal insufficiency     Thyroid disease      SURGICAL HISTORY       Past Surgical History:   Procedure Laterality Date    ABDOMEN SURGERY      APPENDECTOMY      CHOLECYSTECTOMY      FRACTURE SURGERY      GASTRIC BYPASS SURGERY      JOINT REPLACEMENT      knee    PACEMAKER PLACEMENT       FAMILY HISTORY     History reviewed. No pertinent family history. SOCIAL HISTORY       Social History     Socioeconomic History    Marital status:      Spouse name: None    Number of children: None    Years of education: None    Highest education level: None   Tobacco Use    Smoking status: Former    Smokeless tobacco: Never   Substance and Sexual Activity    Alcohol use: Yes     Comment: occasional    Drug use: No    Sexual activity: Never   Lives alone  Has a bird    PHYSICAL EXAM       ED Triage Vitals   BP Temp Temp Source Heart Rate Resp SpO2 Height Weight   09/02/22 1518 09/02/22 1518 09/02/22 1518 09/02/22 1518 09/02/22 1518 09/02/22 1518 09/03/22 0045 09/02/22 1518   139/79 100.1 °F (37.8 °C) Oral 97 20 93 % 5' 9\" (1.753 m) 252 lb (114.3 kg)     Vitals:    Vitals:    09/02/22 2040 09/03/22 0030 09/03/22 0045 09/03/22 0845   BP:  129/66  133/81   Pulse:  77  74   Resp:  23  20   Temp: 99.3 °F (37.4 °C) 98 °F (36.7 °C)  97.9 °F (36.6 °C)   TempSrc: Bladder Oral  Oral   SpO2:  98%  97%   Weight:   252 lb (114.3 kg)    Height:   5' 9\" (1.753 m)      Physical Exam   Constitutional/General: Alert and oriented, NAD- sitting up in chair. Head: NC/AT  Eyes: PERRL, EOMI  Mouth:  no thrush   Neck: Supple   Pulmonary: Lungs are diminished to bases. Not in respiratory distress. On 2 liters nasal canula   Cardiovascular:  Regular rate and rhythm. Abdomen: Soft, + BS. No distension. Nontender. Extremities: Moves all extremities x 4. Warm and well perfused  Some edema. Bilateral knee replacement - incisions healed well.    Skin: Warm and dry without rash  Neurologic:    No focal deficits  Psych: Normal Affect  PIV         DIAGNOSTIC RESULTS   RADIOLOGY:   CT Head WO Contrast    Result Date: 9/2/2022  EXAMINATION: CT OF THE HEAD WITHOUT CONTRAST  9/2/2022 6:43 pm TECHNIQUE: CT of the head was performed without the administration of intravenous contrast. Automated exposure control, iterative reconstruction, and/or weight based adjustment of the mA/kV was utilized to reduce the radiation dose to as low as reasonably achievable. COMPARISON: 11/29/2021. HISTORY: ORDERING SYSTEM PROVIDED HISTORY: Posterior hematoma, fall with headstrike on xarelto TECHNOLOGIST PROVIDED HISTORY: Reason for exam:->Posterior hematoma, fall with headstrike on xarelto Has a \"code stroke\" or \"stroke alert\" been called? ->No Decision Support Exception - unselect if not a suspected or confirmed emergency medical condition->Emergency Medical Condition (MA) FINDINGS: BRAIN/VENTRICLES: There is no acute intracranial hemorrhage, mass effect or midline shift. No abnormal extra-axial fluid collection. The gray-white differentiation is maintained without evidence of an acute infarct. There is no evidence of hydrocephalus. ORBITS: The visualized portion of the orbits demonstrate no acute abnormality. SINUSES: The visualized paranasal sinuses and mastoid air cells demonstrate no acute abnormality. SOFT TISSUES/SKULL:  No acute abnormality of the visualized skull or soft tissues. No acute intracranial abnormality. There is age-appropriate atrophy and small-vessel ischemic disease. Mucosal thickening in the ethmoid air cells. CT Cervical Spine WO Contrast    Result Date: 9/2/2022  EXAMINATION: CT OF THE CERVICAL SPINE WITHOUT CONTRAST 9/2/2022 6:43 pm TECHNIQUE: CT of the cervical spine was performed without the administration of intravenous contrast. Multiplanar reformatted images are provided for review. Automated exposure control, iterative reconstruction, and/or weight based adjustment of the mA/kV was utilized to reduce the radiation dose to as low as reasonably achievable.  COMPARISON: 7-20 HISTORY: ORDERING SYSTEM PROVIDED Value Date/Time    COVID19 DETECTED 09/02/2022 04:10 PM    COVID19 Not Detected 12/03/2020 12:00 PM     COVID-19/KRISTIN-COV2 LABS  Recent Labs     09/02/22  1610 09/02/22  1720   CRP  --  1.9*   LDH  --  238*   AST 23  --    ALT 12  --      Lab Results   Component Value Date/Time    CHOL 159 07/14/2015 12:50 PM    TRIG 80 07/14/2015 12:50 PM    HDL 57 12/16/2017 09:39 AM    LDLCALC 94 12/16/2017 09:39 AM    LABVLDL 12 12/16/2017 09:39 AM     MICROBIOLOGY:  Cultures :   Lab Results   Component Value Date/Time    BC 5 Days no growth 03/02/2021 03:32 PM     Lab Results   Component Value Date/Time    BLOODCULT2 5 Days no growth 03/02/2021 03:32 PM     FINAL IMPRESSION    Patient is a 65 y.o. female who presented with   Chief Complaint   Patient presents with    Fall     Pt fell twice, last night and today, hematoma to back of head, denies CP/SOB, hip replacement in may, feels weak on feet, covid + yesterday    and admitted for   Weakness, fatigue   S/p fall  COVID   Shortness of breath, hypoxia   Rule out pneumonia     Plan:  On azithromycin   Consulted pharmacy for Remdesivir  Monitor labs, check procal, check ddimer  Check CT of chest  On steroids   Check MRSA screen , check respiratory array panel     To follow ordered labs, cultures and imaging. Imaging and labs were reviewed per medical records and any ID pertinent labs were addressed with the patient. The patient/FAMILY  was educated about the diagnosis, prognosis, indications, risks and benefits of treatment. An opportunity to ask questions was given to the patient/FAMILY and questions were answered. Thank you for involving me in the care of Trudy Guo. Please do not hesitate to call for any questions or concerns. Electronically signed by SONIA Mcgregor on 9/3/2022 at 10:44 AM    Pt seen and examined. Above discussed agree with advanced practice nurse. Labs, cultures, and radiographs reviewed.   Face to Face encounter occurred. Changes made as necessary.      Caren Jaramillo MD

## 2022-09-04 LAB
ALBUMIN SERPL-MCNC: 3.7 G/DL (ref 3.5–5.2)
ALP BLD-CCNC: 118 U/L (ref 35–104)
ALT SERPL-CCNC: 15 U/L (ref 0–32)
ANION GAP SERPL CALCULATED.3IONS-SCNC: 13 MMOL/L (ref 7–16)
ANISOCYTOSIS: ABNORMAL
AST SERPL-CCNC: 22 U/L (ref 0–31)
BASOPHILS ABSOLUTE: 0 E9/L (ref 0–0.2)
BASOPHILS RELATIVE PERCENT: 0 % (ref 0–2)
BILIRUB SERPL-MCNC: 0.3 MG/DL (ref 0–1.2)
BUN BLDV-MCNC: 39 MG/DL (ref 6–23)
BURR CELLS: ABNORMAL
CALCIUM SERPL-MCNC: 9.6 MG/DL (ref 8.6–10.2)
CHLORIDE BLD-SCNC: 103 MMOL/L (ref 98–107)
CO2: 20 MMOL/L (ref 22–29)
CREAT SERPL-MCNC: 1.3 MG/DL (ref 0.5–1)
EOSINOPHILS ABSOLUTE: 0 E9/L (ref 0.05–0.5)
EOSINOPHILS RELATIVE PERCENT: 0 % (ref 0–6)
GFR AFRICAN AMERICAN: 48
GFR NON-AFRICAN AMERICAN: 40 ML/MIN/1.73
GLUCOSE BLD-MCNC: 208 MG/DL (ref 74–99)
HCT VFR BLD CALC: 30.4 % (ref 34–48)
HEMOGLOBIN: 9.1 G/DL (ref 11.5–15.5)
LYMPHOCYTES ABSOLUTE: 0.27 E9/L (ref 1.5–4)
LYMPHOCYTES RELATIVE PERCENT: 6.3 % (ref 20–42)
MCH RBC QN AUTO: 24 PG (ref 26–35)
MCHC RBC AUTO-ENTMCNC: 29.9 % (ref 32–34.5)
MCV RBC AUTO: 80.2 FL (ref 80–99.9)
METER GLUCOSE: 171 MG/DL (ref 74–99)
METER GLUCOSE: 177 MG/DL (ref 74–99)
METER GLUCOSE: 184 MG/DL (ref 74–99)
MONOCYTES ABSOLUTE: 0.09 E9/L (ref 0.1–0.95)
MONOCYTES RELATIVE PERCENT: 1.8 % (ref 2–12)
NEUTROPHILS ABSOLUTE: 4.14 E9/L (ref 1.8–7.3)
NEUTROPHILS RELATIVE PERCENT: 92 % (ref 43–80)
OVALOCYTES: ABNORMAL
PDW BLD-RTO: 17.3 FL (ref 11.5–15)
PLATELET # BLD: 167 E9/L (ref 130–450)
PMV BLD AUTO: 12 FL (ref 7–12)
POIKILOCYTES: ABNORMAL
POLYCHROMASIA: ABNORMAL
POTASSIUM SERPL-SCNC: 4.3 MMOL/L (ref 3.5–5)
RBC # BLD: 3.79 E12/L (ref 3.5–5.5)
SCHISTOCYTES: ABNORMAL
SODIUM BLD-SCNC: 136 MMOL/L (ref 132–146)
TOTAL PROTEIN: 6.8 G/DL (ref 6.4–8.3)
WBC # BLD: 4.5 E9/L (ref 4.5–11.5)

## 2022-09-04 PROCEDURE — 6370000000 HC RX 637 (ALT 250 FOR IP): Performed by: CLINICAL NURSE SPECIALIST

## 2022-09-04 PROCEDURE — 6370000000 HC RX 637 (ALT 250 FOR IP): Performed by: NURSE PRACTITIONER

## 2022-09-04 PROCEDURE — 2700000000 HC OXYGEN THERAPY PER DAY

## 2022-09-04 PROCEDURE — 86140 C-REACTIVE PROTEIN: CPT

## 2022-09-04 PROCEDURE — 6370000000 HC RX 637 (ALT 250 FOR IP): Performed by: INTERNAL MEDICINE

## 2022-09-04 PROCEDURE — 94640 AIRWAY INHALATION TREATMENT: CPT

## 2022-09-04 PROCEDURE — 36415 COLL VENOUS BLD VENIPUNCTURE: CPT

## 2022-09-04 PROCEDURE — 2580000003 HC RX 258: Performed by: CLINICAL NURSE SPECIALIST

## 2022-09-04 PROCEDURE — 1200000000 HC SEMI PRIVATE

## 2022-09-04 PROCEDURE — 80053 COMPREHEN METABOLIC PANEL: CPT

## 2022-09-04 PROCEDURE — 97116 GAIT TRAINING THERAPY: CPT | Performed by: PHYSICAL THERAPIST

## 2022-09-04 PROCEDURE — 85025 COMPLETE CBC W/AUTO DIFF WBC: CPT

## 2022-09-04 PROCEDURE — 2580000003 HC RX 258: Performed by: INTERNAL MEDICINE

## 2022-09-04 PROCEDURE — 6360000002 HC RX W HCPCS: Performed by: INTERNAL MEDICINE

## 2022-09-04 PROCEDURE — 82962 GLUCOSE BLOOD TEST: CPT

## 2022-09-04 PROCEDURE — 97161 PT EVAL LOW COMPLEX 20 MIN: CPT | Performed by: PHYSICAL THERAPIST

## 2022-09-04 PROCEDURE — 6360000002 HC RX W HCPCS: Performed by: CLINICAL NURSE SPECIALIST

## 2022-09-04 RX ORDER — ERGOCALCIFEROL 1.25 MG/1
50000 CAPSULE ORAL WEEKLY
Status: DISCONTINUED | OUTPATIENT
Start: 2022-09-04 | End: 2022-09-05 | Stop reason: HOSPADM

## 2022-09-04 RX ADMIN — PYRIDOXINE HCL TAB 50 MG 50 MG: 50 TAB at 09:50

## 2022-09-04 RX ADMIN — LEVOTHYROXINE SODIUM 50 MCG: 0.05 TABLET ORAL at 05:58

## 2022-09-04 RX ADMIN — BUDESONIDE 500 MCG: 0.5 INHALANT RESPIRATORY (INHALATION) at 19:28

## 2022-09-04 RX ADMIN — REMDESIVIR 100 MG: 100 INJECTION, POWDER, LYOPHILIZED, FOR SOLUTION INTRAVENOUS at 17:53

## 2022-09-04 RX ADMIN — CALCITRIOL 0.5 MCG: 0.25 CAPSULE ORAL at 09:49

## 2022-09-04 RX ADMIN — CETIRIZINE HYDROCHLORIDE 10 MG: 10 TABLET, FILM COATED ORAL at 09:50

## 2022-09-04 RX ADMIN — Medication 10 ML: at 09:48

## 2022-09-04 RX ADMIN — DEXAMETHASONE SODIUM PHOSPHATE 6 MG: 10 INJECTION INTRAMUSCULAR; INTRAVENOUS at 09:56

## 2022-09-04 RX ADMIN — ARFORMOTEROL TARTRATE 15 MCG: 15 SOLUTION RESPIRATORY (INHALATION) at 19:28

## 2022-09-04 RX ADMIN — Medication 50 MG: at 09:49

## 2022-09-04 RX ADMIN — RIVAROXABAN 15 MG: 15 TABLET, FILM COATED ORAL at 09:50

## 2022-09-04 RX ADMIN — GABAPENTIN 300 MG: 300 CAPSULE ORAL at 14:01

## 2022-09-04 RX ADMIN — Medication 10 ML: at 20:28

## 2022-09-04 RX ADMIN — GABAPENTIN 300 MG: 300 CAPSULE ORAL at 20:27

## 2022-09-04 RX ADMIN — IPRATROPIUM BROMIDE AND ALBUTEROL SULFATE 1 AMPULE: .5; 2.5 SOLUTION RESPIRATORY (INHALATION) at 09:58

## 2022-09-04 RX ADMIN — CEPHALEXIN 250 MG: 250 CAPSULE ORAL at 09:50

## 2022-09-04 RX ADMIN — SUCRALFATE 1 G: 1 TABLET ORAL at 20:27

## 2022-09-04 RX ADMIN — ARFORMOTEROL TARTRATE 15 MCG: 15 SOLUTION RESPIRATORY (INHALATION) at 06:18

## 2022-09-04 RX ADMIN — SUCRALFATE 1 G: 1 TABLET ORAL at 09:50

## 2022-09-04 RX ADMIN — IPRATROPIUM BROMIDE AND ALBUTEROL SULFATE 1 AMPULE: .5; 2.5 SOLUTION RESPIRATORY (INHALATION) at 06:18

## 2022-09-04 RX ADMIN — ERGOCALCIFEROL 50000 UNITS: 1.25 CAPSULE ORAL at 14:01

## 2022-09-04 RX ADMIN — BUDESONIDE 500 MCG: 0.5 INHALANT RESPIRATORY (INHALATION) at 06:18

## 2022-09-04 RX ADMIN — GABAPENTIN 300 MG: 300 CAPSULE ORAL at 09:49

## 2022-09-04 RX ADMIN — HYDROXYZINE HYDROCHLORIDE 10 MG: 10 TABLET ORAL at 11:57

## 2022-09-04 RX ADMIN — IPRATROPIUM BROMIDE AND ALBUTEROL SULFATE 1 AMPULE: .5; 2.5 SOLUTION RESPIRATORY (INHALATION) at 19:28

## 2022-09-04 RX ADMIN — IPRATROPIUM BROMIDE AND ALBUTEROL SULFATE 1 AMPULE: .5; 2.5 SOLUTION RESPIRATORY (INHALATION) at 13:20

## 2022-09-04 RX ADMIN — OXYCODONE HYDROCHLORIDE AND ACETAMINOPHEN 500 MG: 500 TABLET ORAL at 09:50

## 2022-09-04 RX ADMIN — AZITHROMYCIN MONOHYDRATE 500 MG: 250 TABLET ORAL at 09:50

## 2022-09-04 ASSESSMENT — PAIN SCALES - GENERAL: PAINLEVEL_OUTOF10: 0

## 2022-09-04 NOTE — PROGRESS NOTES
[address] [place] [address] [facility]  Progress Note    CC: COVID, hypoxia  Face to face encounter   SUBJECTIVE:  Patient is tolerating medications. No reported adverse drug reactions. ROS: No nausea, vomiting, diarrhea. Sitting up in chair. Working with [facility]  On 2 liters nasal canula. No fevers. Denies any urinary symptoms currently. Medications:  Scheduled Meds:   sodium chloride flush  5-40 mL IntraVENous 2 times per day    gabapentin  300 mg Oral TID    levothyroxine  50 mcg Oral Daily    rivaroxaban  15 mg Oral Daily with breakfast    calcitRIOL  0.5 mcg Oral Daily    cetirizine  10 mg Oral Daily    hydrOXYzine HCl  10 mg Oral Daily    sucralfate  1 g Oral BID    dexamethasone  6 mg IntraVENous Q24H    azithromycin  500 mg Oral Daily    Arformoterol Tartrate  15 mcg Nebulization BID    budesonide  0.5 mg Nebulization BID    ipratropium-albuterol  1 ampule Inhalation Q4H WA    zinc gluconate  50 mg Oral Daily    ascorbic acid  500 mg Oral Daily    vitamin B-6  50 mg Oral Daily    cephALEXin  250 mg Oral Daily    remdesivir IVPB  100 mg IntraVENous Q24H     Continuous Infusions:   dextrose      sodium chloride       PRN Meds:glucose, dextrose bolus **OR** dextrose bolus, glucagon (rDNA), dextrose, sodium chloride flush, sodium chloride, polyethylene glycol, acetaminophen **OR** acetaminophen, benzonatate, traMADol, sodium chloride  OBJECTIVE:  Patient Vitals for the past 24 hrs:   BP Temp Temp src Pulse Resp SpO2   09/04/22 0850 134/73 98.7 °F (37.1 °C) Oral 86 20 99 %   09/04/22 0630 -- -- -- -- -- 97 %   09/03/22 2000 -- -- -- -- -- (!) 87 %   09/03/22 1945 128/70 97.7 °F (36.5 °C) Oral 91 20 98 %     Physical Exam    Constitutional/General: Alert and oriented, NAD- sitting up in chair. working with [facility]  Head: NC/AT  Eyes: PERRL, EOMI  Mouth:  no thrush   Neck: Supple   Pulmonary: Lungs are diminished to bases. Not in respiratory distress.  On 2 liters nasal canula   Cardiovascular:  Regular rate and rhythm. Abdomen: Soft, + BS. No distension. Nontender. Extremities: Moves all extremities x 4. Warm and well perfused  Some edema. Bilateral knee replacement - incisions healed well.    Skin: Warm and dry without rash  Neurologic:    No focal deficits  Psych: Normal Affect  PIV       Laboratory and Tests Review:  Lab Results   Component Value Date    WBC 2.2 (L) 09/03/2022    WBC 4.5 09/02/2022    WBC 4.4 (L) 11/29/2021    HGB 9.4 (L) 09/03/2022    HCT 32.4 (L) 09/03/2022    MCV 82.0 09/03/2022     09/03/2022     Lab Results   Component Value Date    NEUTROABS 1.87 09/03/2022    NEUTROABS 3.92 09/02/2022    NEUTROABS 2.75 11/29/2021     Lab Results   Component Value Date    CRP 2.1 (H) 09/03/2022    CRP 1.9 (H) 09/02/2022    CRP 0.8 (H) 03/02/2021     Lab Results   Component Value Date    SEDRATE 29 (H) 03/02/2021     Lab Results   Component Value Date    ALT 13 09/03/2022    AST 22 09/03/2022    ALKPHOS 118 (H) 09/03/2022    BILITOT 0.5 09/03/2022     Lab Results   Component Value Date/Time     09/03/2022 04:09 PM    K 4.1 09/03/2022 04:09 PM    K 4.4 09/02/2022 04:10 PM     09/03/2022 04:09 PM    CO2 21 09/03/2022 04:09 PM    BUN 30 09/03/2022 04:09 PM    CREATININE 1.3 09/03/2022 04:09 PM    GFRAA 48 09/03/2022 04:09 PM    LABGLOM 40 09/03/2022 04:09 PM    GLUCOSE 235 09/03/2022 04:09 PM    PROT 7.2 09/03/2022 04:09 PM    LABALBU 4.1 09/03/2022 04:09 PM    CALCIUM 9.3 09/03/2022 04:09 PM    BILITOT 0.5 09/03/2022 04:09 PM    ALKPHOS 118 09/03/2022 04:09 PM    AST 22 09/03/2022 04:09 PM    ALT 13 09/03/2022 04:09 PM     Radiology:  Reviewed     Microbiology:   Urine cx-pending  MRSA screen- pending  + COVID     ASSESSMENT:  COVID  Shortness of breath - hypoxia   Weakness/ s/p fall  History of recurrent UTI    PLAN:  Continue Remdesivir- Day- #2 of 5  Continue Azithromycin- Day #2  Also on Keflex 250 mg daily- I had a lengthy discussion with patient- she has been on this dose for years and has been to see urologist- she has recurrent UTIs and reports this help her not have them. I attempted to discuss with patient that this is not an appropriate dose of Keflex and it is not beneficial to stay on it. She wants to continue it and discuss it with her PCP and urologist.   Check cultures  Monitor labs  CT reviewed   Encourage IS use  Wean off oxygen as tolerated. Yamilet Tam, APRN - CNS  10:48 AM  9/4/2022       Pt seen and examined. Above discussed agree with advanced practice nurse. Labs, cultures, and radiographs reviewed. Face to Face encounter occurred. Changes made as necessary.      Jay Deluna MD

## 2022-09-04 NOTE — PLAN OF CARE
Problem: Pain  Goal: Verbalizes/displays adequate comfort level or baseline comfort level  9/3/2022 2311 by Sena Washington RN  Outcome: Progressing     Problem: Safety - Adult  Goal: Free from fall injury  9/3/2022 2311 by Sena Washington RN  Outcome: Progressing     Problem: Chronic Conditions and Co-morbidities  Goal: Patient's chronic conditions and co-morbidity symptoms are monitored and maintained or improved  9/3/2022 2311 by Sena Washington RN  Outcome: Progressing     Problem: Cardiovascular - Adult  Goal: Maintains optimal cardiac output and hemodynamic stability  9/3/2022 2311 by Sena Washington RN  Outcome: Progressing     Problem: Cardiovascular - Adult  Goal: Absence of cardiac dysrhythmias or at baseline  9/3/2022 2311 by Sena Washington RN  Outcome: Progressing     Problem: Skin/Tissue Integrity - Adult  Goal: Skin integrity remains intact  9/3/2022 2311 by Sena Washington RN  Outcome: Progressing

## 2022-09-04 NOTE — PROGRESS NOTES
Internal Medicine Progress Note    [facility]    Rossi Olivares. Lianna Gardiner, DONA May D.O., DONA Martines D.O. Juice Thompson D.O. Hector Tolentino, MSN, APRN, NP-C  Lorelei Andersen. Dakotah Henriquez, MSN, APRN-CNP     Primary Care Physician: Honorio Jorge MD   Admitting Physician:  Lynnette Fox DO  Admission date and time: 9/2/2022  3:09 PM    Room:  [address] [place] [address]  Admitting diagnosis: Generalized weakness [R53.1]  Anticoagulated [Z79.01]  Acute respiratory failure with hypoxia ([facility] [address] [J96.01]  Closed head injury, initial encounter [S09.90XA]  Fall, initial encounter [W19. XXXA]  Anemia, unspecified type [D64.9]  Acute hypoxemic respiratory failure due to COVID-19 ([facility] [address] [U07.1, J96.01]  COVID-19 [U07.1]    Patient Name: Robinson Carey  MRN: [id number]    Date of Service: 9/4/2022     Subjective:  Nat Bales is a 65 y.o. female who was seen and examined today,9/4/2022, at the bedside. Patient was sitting up in chair at time examination. She states she is very fatigued today. States that the middle the night someone came into her room and woke her up and informed her that her oxygen level was low. We reviewed the chart with the patient and she was found to have an oxygen saturation of 87%. Patient is on 2 L/min nasal cannula. States that after she was unable to fall asleep and this is why she is much more fatigued today. Patient does admit to mild weakness. Patient continues to have complaint of left hip pain status post fall. We did review x-ray results with the patient which revealed no acute fracture or subluxation involving the left hip. Patient verbalized understanding. No family present during my examination. Review of System:   Constitutional:   Denies fever or chills, weight loss or gain, positive for fatigue/malaise-improved today. Oskar Librado     HEENT:   Denies ear pain, sore throat, sinus or eye problems. Cardiovascular:   Denies any chest pain, irregular heartbeats, or palpitations. Respiratory:   Admits to shortness of breath, coughing, no sputum production, hemoptysis, or wheezing. Gastrointestinal:   Denies nausea, vomiting, diarrhea, or constipation. Denies any abdominal pain. Patient reports she has chronic bowel incontinence. Genitourinary:    Denies any urgency, frequency, hematuria. Voiding  without difficulty. Patient admits to to chronic urinary incontinence-urinating okay status post Brewer catheter removal  Extremities:   Patient admits to chronic swelling of lower extremities. Neurology:    Denies any headache or focal neurological deficits, admits to generalized weakness. Psch:   Denies being anxious or depressed. Musculoskeletal:    Denies  myalgias, joint complaints. Patient is a chronic back pain. History of bilateral knee surgery. States primary care physician does prescribe tramadol for her which she takes very sparingly. Left hip pain status post fall  Integumentary:   Denies any rashes, ulcers, or excoriations. Denies bruising. Hematologic/Lymphatic:  Denies bruising or bleeding. Physical Exam:  I/O this shift: In: 780 [P.O.:780]  Out: 0     Intake/Output Summary (Last 24 hours) at 9/4/2022 1506  Last data filed at 9/4/2022 1357  Gross per 24 hour   Intake 780 ml   Output 0 ml   Net 780 ml   I/O last 3 completed shifts: In: 580 [P.O.:580]  Out: 1000 [Urine:1000]  Patient Vitals for the past 96 hrs (Last 3 readings):   Weight   09/03/22 0045 252 lb (114.3 kg)   09/02/22 1518 252 lb (114.3 kg)     Vital Signs:   Blood pressure 134/73, pulse 86, temperature 98.7 °F (37.1 °C), temperature source Oral, resp. rate 20, height 5' 9\" (1.753 m), weight 252 lb (114.3 kg), SpO2 99 %, not currently breastfeeding. General appearance:  Alert, responsive, oriented to person, place, and time. Well preserved, alert, no distress. Head:  Normocephalic.  No masses, lesions or 32.2* 32.4*   MCV 78.9* 82.0   MCH 23.3* 23.8*   MCHC 29.5* 29.0*   RDW 17.1* 17.2*    152   MPV 11.8 11.6     Recent Labs     09/02/22  1610 09/03/22  1609    136   K 4.4 4.1    102   CO2 24 21*   BUN 21 30*   CREATININE 1.4* 1.3*   GLUCOSE 89 235*   CALCIUM 9.2 9.3   PROT 6.9 7.2   LABALBU 3.9 4.1   BILITOT 0.7 0.5   ALKPHOS 116* 118*   AST 23 22   ALT 12 13     Lab Results   Component Value Date    TROPONINI 0.02 03/02/2021    TROPONINI <0.01 09/03/2018            Assessment:  COVID-19  Acute hypoxic respiratory failure  Mechanical fall x2  Acute on chronic anemia  Elevated troponin  Chronic normocytic anemia  Arthritis  Atrial fibrillation  GERD  History of MI  Hypothyroidism  Bowel and bladder incontinence  History of gastric bypass  Pacemaker in situ  tobacco abuse next  Chronic kidney disease    Plan:   Infectious disease is following the patient. Discussion was had with him in regard to discharge planning. Informed that if patient is stable from value medical standpoint she can be discharged home tomorrow from their perspective. Continue remdesivir. Antibiotic therapy at their discretion. Continue tramadol for chronic pain syndrome. Supportive oxygen therapy to maintain O2 saturation above 90%-wean/titrate as tolerated-patient will need to be evaluated for home-going oxygen. Continue Dexamethasone   Monitor inflammatory markers    Anticoagulation: Continue Xarelto. Activity as tolerated. PT/OT to evaluate and treat. Incentive spirometry as instructed. Sit up in chair as often as tolerated and prone as often as tolerated but at least every 2 hours. Continue current respiratory treatments. Maximum COVID protocol is being undertaken  Monitor blood glucose levels and treat accordingly  We will continue to address underlying comorbidities during the hospitalization. Continue current therapy. See orders for further plan of care.     More than 50% of my  time was spent at the bedside counseling/coordinating care with the patient and/or family with face to face contact. This time was spent reviewing notes and laboratory data as well as instructing and counseling the patient. Time I spent with the family or surrogate(s) is included only if the patient was incapable of providing the necessary information or participating in medical decisions. I also discussed the differential diagnosis and all of the proposed management plans with the patient and individuals accompanying the patient. The patient was seen, examined and then discussed with Dr. Romana Emmer. SONIA Cooley - CNP,  9/4/2022  3:06 PM        I saw and evaluated the patient. I agree with the findings and the plan of care as documented in Natalie Salomon NP-C's  note.     Benji Escoto DO, D.O., FACOI  3:15 PM  9/4/2022

## 2022-09-04 NOTE — PROGRESS NOTES
Physical Therapy    Physical Therapy Initial Evaluation/Plan of Care    Room #:  3791/0432-13  Patient Name: Juan Norman  YOB: 1945  MRN: [id number]    Date of Service: 9/4/2022     Tentative placement recommendation: Home Health Physical Therapy   Equipment recommendation: Patient has needed equipment       Evaluating Physical Therapist: Albina Hook, PT  #[id number]      Specific Provider Orders/Date/Referring Provider :  09/03/22 1630    PT eval and treat  Start:  09/03/22 1630,   End:  09/03/22 1630,   ONE TIME,   Standing Count:  1 Occurrences,   R         Lore Kayser, APRN - CNP     Admitting Diagnosis:   Generalized weakness [R53.1]  Anticoagulated [Z79.01]  Acute respiratory failure with hypoxia ([facility] [address] [J96.01]  Closed head injury, initial encounter [M65.06DY]  Fall, initial encounter [W19. XXXA]  Anemia, unspecified type [D64.9]  Acute hypoxemic respiratory failure due to COVID-19 ([facility] [address] [U07.1, J96.01]  COVID-19 [U07.1]    Admitted with    weakness, multiple falls, hit head, shortness of breath on exertion covid  Hip xray neg fracture  Surgery: none  Visit Diagnoses         Codes    Acute respiratory failure with hypoxia ([facility] [address]    -  Primary J96.01    Anemia, unspecified type     D64.9    Closed head injury, initial encounter     S09.90XA    Fall, initial encounter     W19. XXXA    Generalized weakness     R53.1    Anticoagulated     Z79.01            Patient Active Problem List   Diagnosis    Generalized abdominal pain    Diarrhea    Atrial fibrillation (HCC)    Hypothyroid    Acute renal failure (ARF) (HCC)    Acute hypoxemic respiratory failure due to COVID-19 [facility])    COVID-19        ASSESSMENT of Current Deficits Patient exhibits decreased strength, balance, and endurance impairing functional mobility, transfers, gait , gait distance, and tolerance to activity are barriers to d/c and require skilled intervention during hospital stay to attain pre hospital level of function.  Decreased strength, balance and endurance  increases patient's risk for fall. Patient with difficulty oxygenating impairing endurance and functional independence         PHYSICAL THERAPY  PLAN OF CARE       Physical therapy plan of care is established based on physician order,  patient diagnosis and clinical assessment    Current Treatment Recommendations:    -Bed Mobility: Lower extremity exercises , Upper extremity exercises , and Trunk control activities   -Standing Balance: Perform strengthening exercises in standing to promote motor control with or without upper extremity support   -Transfers: Provide instruction on proper hand and foot position for adequate transfer of weight onto lower extremities and use of gait device if needed and Cues for hand placement, technique and safety. Provide stabilization to prevent fall   -Gait: Gait training and Standing activities to improve: base of support, weight shift, weight bearing    -Endurance: Utilize Supervised activities to increase level of endurance to allow for safe functional mobility including transfers and gait   -Stairs: Stair training with instruction on proper technique and hand placement on rail    PT long term treatment goals are located in below grid    Patient and or family understand(s) diagnosis, prognosis, and plan of care. Frequency of treatments: Patient will be seen  daily.          Prior Level of Function: Patient ambulated independently and with rollator    Rehab Potential: good   for baseline    Past medical history:   Past Medical History:   Diagnosis Date    Arthritis     Atrial fibrillation (HCC)     GERD (gastroesophageal reflux disease)     History of blood transfusion     MI, old     Renal insufficiency     Thyroid disease      Past Surgical History:   Procedure Laterality Date    ABDOMEN SURGERY      APPENDECTOMY      CHOLECYSTECTOMY      FRACTURE SURGERY      GASTRIC BYPASS SURGERY      JOINT REPLACEMENT      knee    PACEMAKER PLACEMENT SUBJECTIVE:    Precautions: Up with assistance and Continuous Pulse Oximetry , falls, alarm, and Droplet plus/COVID-19 ,  2 Liters of o2 via nasal cannula     Social history: Patient lives alone in a apartment     with Elevator  to enter   Tub shower grab bars    Equipment owned: [address],  tub bench     [address] [facility]   How much difficulty turning over in bed?: None  How much difficulty sitting down on / standing up from a chair with arms?: A Little  How much difficulty moving from lying on back to sitting on side of bed?: A Little  How much help from another person moving to and from a bed to a chair?: A Little  How much help from another person needed to walk in hospital room?: A Little  How much help from another person for climbing 3-5 steps with a railing?: A Little  AM-PAC Inpatient Mobility Raw Score : 19  AM-PAC Inpatient T-Scale Score : 45.44  Mobility Inpatient CMS 0-100% Score: 41.77  Mobility Inpatient CMS G-Code Modifier : CK    Nursing cleared patient for PT evaluation. The admitting diagnosis and active problem list as listed above have been reviewed prior to the initiation of this evaluation. OBJECTIVE;   Initial Evaluation  Date: 9/4/2022 Treatment Date:     Short Term/ Long Term   Goals   Was pt agreeable to Eval/treatment? Yes  To be met in 3 days   Pain level   0/10        Bed Mobility    Rolling: Supervision     Supine to sit: Supervision     Sit to supine: Supervision     Scooting: Supervision     Rolling: Independent    Supine to sit:  Independent    Sit to supine: Independent    Scooting: Independent     Transfers Sit to stand: Minimal assist of 1  x 3 reps   Sit to stand: Independent     Ambulation     2x75 feet using  no device with Supervision    cues for safety and proper hand placement    125 feet using  wheeled walker with Independent    ROM Within functional limits        Strength BUE:   4/5  RLE:  3/5  LLE:  4/5  Increase strength in affected mm groups by 1/3 grade   Balance Sitting EOB:  not assessed    Dynamic Standing:  good     Sitting EOB:  good    Dynamic Standing: good       Patient is Alert & Oriented x person, place, time, and situation and follows directions    Sensation:  Patient  denies numbness/tingling   Edema:  yes bilateral lower extremities   Endurance: fair        Vitals:  2 liters nasal cannula   Blood Pressure at rest  Blood Pressure during session    Heart Rate at rest   Heart Rate during session     SPO2 at rest 100%  SPO2 during session 98%   Room air at rest 97%  and with activity 94-97%  Patient education  Patient educated on role of Physical Therapy, risks of immobility, safety and plan of care, importance of positional changes for oxygen exchange,  importance of mobility while in hospital , safety , and proper use/technique of incentive spirometer     Patient response to education:   Pt verbalized understanding Pt demonstrated skill Pt requires further education in this area   Yes Partial Yes      Treatment:  Patient practiced and was instructed/facilitated in the following treatment: Patient       performed  incentive spirometer and gait training     Therapeutic Exercises:  ankle pumps  x 10 reps. At end of session, patient in chair with  call light and phone within reach,  all lines and tubes intact, nursing notified. Patient would benefit from continued skilled Physical Therapy to improve functional independence and quality of life. Patient's/ family goals   home    Time in  1139  Time out  1209    Total Treatment Time  10 minutes    Evaluation time includes thorough review of current medical information, gathering information on past medical history/social history and prior level of function, completion of standardized testing/informal observation of tasks, assessment of data, and development of Plan of care and goals.      CPT codes:  Low Complexity PT evaluation (68006)  Gait Training (96539) 10 minutes 1 unit(s)    Zoey Walker, PT

## 2022-09-05 VITALS
OXYGEN SATURATION: 98 % | TEMPERATURE: 98.1 F | WEIGHT: 252 LBS | BODY MASS INDEX: 37.33 KG/M2 | RESPIRATION RATE: 24 BRPM | HEART RATE: 88 BPM | DIASTOLIC BLOOD PRESSURE: 90 MMHG | SYSTOLIC BLOOD PRESSURE: 127 MMHG | HEIGHT: 69 IN

## 2022-09-05 LAB
C-REACTIVE PROTEIN: 1.3 MG/DL (ref 0–0.4)
METER GLUCOSE: 156 MG/DL (ref 74–99)
ORGANISM: ABNORMAL

## 2022-09-05 PROCEDURE — 6370000000 HC RX 637 (ALT 250 FOR IP): Performed by: CLINICAL NURSE SPECIALIST

## 2022-09-05 PROCEDURE — 94640 AIRWAY INHALATION TREATMENT: CPT

## 2022-09-05 PROCEDURE — 6360000002 HC RX W HCPCS: Performed by: INTERNAL MEDICINE

## 2022-09-05 PROCEDURE — 6370000000 HC RX 637 (ALT 250 FOR IP): Performed by: INTERNAL MEDICINE

## 2022-09-05 PROCEDURE — 6360000002 HC RX W HCPCS: Performed by: CLINICAL NURSE SPECIALIST

## 2022-09-05 PROCEDURE — 2580000003 HC RX 258: Performed by: INTERNAL MEDICINE

## 2022-09-05 PROCEDURE — 82962 GLUCOSE BLOOD TEST: CPT

## 2022-09-05 PROCEDURE — 2580000003 HC RX 258: Performed by: CLINICAL NURSE SPECIALIST

## 2022-09-05 PROCEDURE — 6370000000 HC RX 637 (ALT 250 FOR IP): Performed by: NURSE PRACTITIONER

## 2022-09-05 RX ORDER — ASCORBIC ACID 500 MG
500 TABLET ORAL DAILY
Qty: 30 TABLET | Refills: 0 | Status: SHIPPED | OUTPATIENT
Start: 2022-09-05

## 2022-09-05 RX ORDER — AZITHROMYCIN 500 MG/1
500 TABLET, FILM COATED ORAL DAILY
Qty: 2 TABLET | Refills: 0 | Status: SHIPPED | OUTPATIENT
Start: 2022-09-06 | End: 2022-09-08

## 2022-09-05 RX ORDER — ZINC GLUCONATE 50 MG
50 TABLET ORAL DAILY
Qty: 30 TABLET | Refills: 0 | Status: SHIPPED | OUTPATIENT
Start: 2022-09-05

## 2022-09-05 RX ORDER — DEXAMETHASONE 6 MG/1
6 TABLET ORAL
Qty: 10 TABLET | Refills: 0 | Status: SHIPPED | OUTPATIENT
Start: 2022-09-05 | End: 2022-09-15

## 2022-09-05 RX ORDER — ALBUTEROL SULFATE 90 UG/1
2 AEROSOL, METERED RESPIRATORY (INHALATION) 4 TIMES DAILY PRN
Qty: 18 G | Refills: 0 | Status: SHIPPED | OUTPATIENT
Start: 2022-09-05

## 2022-09-05 RX ORDER — BUDESONIDE AND FORMOTEROL FUMARATE DIHYDRATE 160; 4.5 UG/1; UG/1
2 AEROSOL RESPIRATORY (INHALATION) 2 TIMES DAILY
Qty: 10.2 G | Refills: 1 | Status: SHIPPED | OUTPATIENT
Start: 2022-09-05

## 2022-09-05 RX ORDER — PYRIDOXINE HCL (VITAMIN B6) 50 MG
50 TABLET ORAL DAILY
Qty: 30 TABLET | Refills: 0 | Status: SHIPPED | OUTPATIENT
Start: 2022-09-05

## 2022-09-05 RX ADMIN — PYRIDOXINE HCL TAB 50 MG 50 MG: 50 TAB at 09:21

## 2022-09-05 RX ADMIN — AZITHROMYCIN MONOHYDRATE 500 MG: 250 TABLET ORAL at 09:18

## 2022-09-05 RX ADMIN — IPRATROPIUM BROMIDE AND ALBUTEROL SULFATE 1 AMPULE: .5; 2.5 SOLUTION RESPIRATORY (INHALATION) at 06:52

## 2022-09-05 RX ADMIN — SUCRALFATE 1 G: 1 TABLET ORAL at 09:19

## 2022-09-05 RX ADMIN — IPRATROPIUM BROMIDE AND ALBUTEROL SULFATE 1 AMPULE: .5; 2.5 SOLUTION RESPIRATORY (INHALATION) at 10:07

## 2022-09-05 RX ADMIN — Medication 50 MG: at 09:18

## 2022-09-05 RX ADMIN — IPRATROPIUM BROMIDE AND ALBUTEROL SULFATE 1 AMPULE: .5; 2.5 SOLUTION RESPIRATORY (INHALATION) at 15:01

## 2022-09-05 RX ADMIN — CEPHALEXIN 250 MG: 250 CAPSULE ORAL at 09:20

## 2022-09-05 RX ADMIN — GABAPENTIN 300 MG: 300 CAPSULE ORAL at 09:20

## 2022-09-05 RX ADMIN — GABAPENTIN 300 MG: 300 CAPSULE ORAL at 13:31

## 2022-09-05 RX ADMIN — CALCITRIOL 0.5 MCG: 0.25 CAPSULE ORAL at 09:19

## 2022-09-05 RX ADMIN — DEXAMETHASONE SODIUM PHOSPHATE 6 MG: 10 INJECTION INTRAMUSCULAR; INTRAVENOUS at 09:20

## 2022-09-05 RX ADMIN — OXYCODONE HYDROCHLORIDE AND ACETAMINOPHEN 500 MG: 500 TABLET ORAL at 09:19

## 2022-09-05 RX ADMIN — REMDESIVIR 100 MG: 100 INJECTION, POWDER, LYOPHILIZED, FOR SOLUTION INTRAVENOUS at 14:00

## 2022-09-05 RX ADMIN — ARFORMOTEROL TARTRATE 15 MCG: 15 SOLUTION RESPIRATORY (INHALATION) at 06:52

## 2022-09-05 RX ADMIN — LEVOTHYROXINE SODIUM 50 MCG: 0.05 TABLET ORAL at 06:16

## 2022-09-05 RX ADMIN — Medication 10 ML: at 09:20

## 2022-09-05 RX ADMIN — HYDROXYZINE HYDROCHLORIDE 10 MG: 10 TABLET ORAL at 09:19

## 2022-09-05 RX ADMIN — RIVAROXABAN 15 MG: 15 TABLET, FILM COATED ORAL at 09:19

## 2022-09-05 RX ADMIN — BUDESONIDE 500 MCG: 0.5 INHALANT RESPIRATORY (INHALATION) at 06:52

## 2022-09-05 RX ADMIN — CETIRIZINE HYDROCHLORIDE 10 MG: 10 TABLET, FILM COATED ORAL at 09:19

## 2022-09-05 NOTE — DISCHARGE SUMMARY
adjustment of the mA/kV was utilized to reduce the radiation dose to as low as reasonably achievable. COMPARISON: 11/29/2021. HISTORY: ORDERING SYSTEM PROVIDED HISTORY: Posterior hematoma, fall with headstrike on xarelto TECHNOLOGIST PROVIDED HISTORY: Reason for exam:->Posterior hematoma, fall with headstrike on xarelto Has a \"code stroke\" or \"stroke alert\" been called? ->No Decision Support Exception - unselect if not a suspected or confirmed emergency medical condition->Emergency Medical Condition (MA) FINDINGS: BRAIN/VENTRICLES: There is no acute intracranial hemorrhage, mass effect or midline shift. No abnormal extra-axial fluid collection. The gray-white differentiation is maintained without evidence of an acute infarct. There is no evidence of hydrocephalus. ORBITS: The visualized portion of the orbits demonstrate no acute abnormality. SINUSES: The visualized paranasal sinuses and mastoid air cells demonstrate no acute abnormality. SOFT TISSUES/SKULL:  No acute abnormality of the visualized skull or soft tissues. No acute intracranial abnormality. There is age-appropriate atrophy and small-vessel ischemic disease. Mucosal thickening in the ethmoid air cells. CT CHEST WO CONTRAST    Result Date: 9/3/2022  EXAMINATION: CT OF THE CHEST WITHOUT CONTRAST 9/3/2022 2:44 pm TECHNIQUE: CT of the chest was performed without the administration of intravenous contrast. Multiplanar reformatted images are provided for review. Automated exposure control, iterative reconstruction, and/or weight based adjustment of the mA/kV was utilized to reduce the radiation dose to as low as reasonably achievable. COMPARISON: None. HISTORY: ORDERING SYSTEM PROVIDED HISTORY: covid, pneumonia TECHNOLOGIST PROVIDED HISTORY: Reason for exam:->covid, pneumonia FINDINGS: Mediastinum: Ascending aorta is 3.9 cm in diameter compatible with mild dilatation. Pulmonary artery is 3.4 cm in diameter.   No pathologic mediastinal hilar or axillary adenopathy. There is mild 4 chamber cardiac enlargement. Pacing electrodes in the right heart. Lungs/pleura: Very minimal mosaic attenuation could reflect small airway disease in the lung bases. No pleural fluid consolidation or pneumothorax. No endobronchial lesions. No evidence of bronchiectasis. No ground-glass opacity to suggest viral pneumonia. Upper Abdomen: Postop changes involving the stomach. Upper abdomen otherwise unremarkable. Soft Tissues/Bones:  No acute abnormality of the visualized osseous structures. 1.  No ground-glass opacities or dense alveolar consolidation to suggest acute pneumonia. 2.  Very slight mosaic attenuation of the lungs could reflect small airway disease/bronchiolitis. 3.  No pleural fluid. 4.  Mild dilatation of the ascending aorta measuring 4 cm, consider follow-up imaging in 6-12 months. CT Cervical Spine WO Contrast    Result Date: 9/2/2022  EXAMINATION: CT OF THE CERVICAL SPINE WITHOUT CONTRAST 9/2/2022 6:43 pm TECHNIQUE: CT of the cervical spine was performed without the administration of intravenous contrast. Multiplanar reformatted images are provided for review. Automated exposure control, iterative reconstruction, and/or weight based adjustment of the mA/kV was utilized to reduce the radiation dose to as low as reasonably achievable. COMPARISON: 7-20 HISTORY: ORDERING SYSTEM PROVIDED HISTORY: fall with headstrike TECHNOLOGIST PROVIDED HISTORY: Reason for exam:->fall with headstrike Decision Support Exception - unselect if not a suspected or confirmed emergency medical condition->Emergency Medical Condition (MA) FINDINGS: BONES/ALIGNMENT: There is no acute fracture or traumatic malalignment. C5-6 fusion and anterior wedge compression deformities redemonstrated. Scoliosis noted DEGENERATIVE CHANGES: Overall moderate appearing SOFT TISSUES: There is no prevertebral soft tissue swelling. No acute fracture is identified.      XR CHEST PORTABLE    Result Date: 9/2/2022  EXAMINATION: ONE XRAY VIEW OF THE CHEST 9/2/2022 3:36 pm COMPARISON: 11/29/2021. HISTORY: ORDERING SYSTEM PROVIDED HISTORY: Generalized weakness, covid+ TECHNOLOGIST PROVIDED HISTORY: Reason for exam:->Generalized weakness, covid+ FINDINGS: There is a pacemaker/defibrillator. The cardiac silhouette is enlarged. The pulmonary vasculature is within normal limits. The lungs again demonstrate chronic-appearing interstitial changes. No focal pulmonary consolidation or collapse is identified. No pneumothorax or pleural effusion is seen. Enlarged cardiac silhouette and chronic-appearing pulmonary interstitial changes. HOSPITAL COURSE:   Bailee Pelaoy did well throughout hospitalization. She was initially admitted September 2 secondary to multiple falls at home due to generalized weakness. There is no syncope associated with this. She also had upper respiratory symptoms. Infectious work-up was undertaken the patient was found to have COVID-19 pneumonia and resultant acute respiratory failure with hypoxia. She was placed on maximal COVID protocol and the ID team provided consultation. He was treated with remdesivir, dexamethasone, multiple vitamin therapy respiratory supportive measures. She responded well to this and was weaned from oxygen. Initial work-up showed a mildly elevated troponin with nonischemic EKG without any anginal symptoms. She does have underlying coronary artery disease and this was attributed to demand ischemia from the above-mentioned respiratory condition. She was transitioned to inhaler therapy, oral dexamethasone and vitamins for home. Antimicrobial regimen is finalized by the ID team prior to discharge as well. She did well with the therapy team and was determined that she would benefit from discharge with home health care.   Her chronic comorbidities, laboratory values and vital signs were monitored and addressed accordingly throughout the hospitalization. Blood glucose was mildly elevated in setting of steroid administration but we expect this to a lesser degree with the oral steroid regimen. He understands the importance of quarantine and close outpatient follow-up. BRIEF PHYSICAL EXAMINATION AND LABORATORIES ON DAY OF DISCHARGE:  VITALS:  BP (!) 127/90   Pulse 88   Temp 98.1 °F (36.7 °C)   Resp 24   Ht 5' 9\" (1.753 m)   Wt 252 lb (114.3 kg)   SpO2 98%   BMI 37.21 kg/m²     HEENT:  PERRLA. EOMI. Sclera clear. Buccal mucosa moist.    Neck:  Supple. Trachea midline. No thyromegaly. No JVD. No bruits. Heart:  Rhythm regular, rate controlled. S1 and S2, systolic murmur. Lungs:  Symmetrical.  Diminished air exchange. Pattern is regular and even without labor. Clear to auscultation bilaterally. No wheezes. No rhonchi. No rales. Abdomen: Soft. Non-tender. Non-distended. Bowel sounds positive. No organomegaly or masses. No pain on palpation    Extremities:  Peripheral pulses present. No significant pitting peripheral edema. No ulcers. Neurologic:  Alert x 3. Mild generalized weakness without focal deficit. Cranial nerves grossly intact. Skin:  No petechia. No hemorrhage. No wounds. DISPOSITION:  The patient's condition is good. At this time the patient is without objective evidence of an acute process requiring continuing hospitalization or inpatient management. They are stable for discharge with outpatient follow-up. I have spoken with the patient and discussed the results of the current hospitalization, in addition to providing specific details for the plan of care and counseling regarding the diagnosis and prognosis. The plan has been discussed in detail and they are aware of the specific conditions for emergent return, as well as the importance of follow-up.   Their questions are answered at this time and they are agreeable with the plan for discharge to home    DISCHARGE MEDICATIONS:   Current Discharge Medication List             Details   albuterol sulfate HFA (VENTOLIN HFA) 108 (90 Base) MCG/ACT inhaler Inhale 2 puffs into the lungs 4 times daily as needed for Wheezing  Qty: 18 g, Refills: 0      albuterol-ipratropium (COMBIVENT RESPIMAT)  MCG/ACT AERS inhaler Inhale 1 puff into the lungs in the morning and 1 puff at noon and 1 puff in the evening and 1 puff before bedtime. Qty: 4 g, Refills: 1      budesonide-formoterol (SYMBICORT) 160-4.5 MCG/ACT AERO Inhale 2 puffs into the lungs 2 times daily . Rinse mouth and spit after each use. Qty: 10.2 g, Refills: 1      dexamethasone (DECADRON) 6 MG tablet Take 1 tablet by mouth daily (with breakfast) for 10 days  Qty: 10 tablet, Refills: 0      zinc gluconate 50 MG tablet Take 1 tablet by mouth daily  Qty: 30 tablet, Refills: 0      ascorbic acid (VITAMIN C) 500 MG tablet Take 1 tablet by mouth daily  Qty: 30 tablet, Refills: 0      vitamin B-6 (B-6) 50 MG tablet Take 1 tablet by mouth daily  Qty: 30 tablet, Refills: 0                Details   benzonatate (TESSALON) 100 MG capsule Take 100 mg by mouth 3 times daily as needed for Cough      nirmatrelvir/ritonavir (PAXLOVID, 300/100,) 20 x 150 MG & 10 x 100MG TBPK Take 3 tablets by mouth every 12 hours Take 3 tablets (two 150 mg nirmatrelvir and one 100 mg ritonavir tablets) by mouth every 12 hours for 5 days. polyethylene glycol (GLYCOLAX) 17 GM/SCOOP powder Take 17 g by mouth daily as needed (Constipation)      vitamin D (ERGOCALCIFEROL) 1.25 MG (77315 UT) CAPS capsule Take 50,000 Units by mouth once a week Sunday      cetirizine (ZYRTEC) 10 MG tablet Take 10 mg by mouth daily      hydrOXYzine (ATARAX) 10 MG tablet Take 10 mg by mouth daily      gabapentin (NEURONTIN) 300 MG capsule Take 300 mg by mouth 3 times daily.       cephALEXin (KEFLEX) 250 MG capsule Take 250 mg by mouth daily      rivaroxaban (XARELTO) 15 MG TABS tablet Take 15 mg by mouth daily (with breakfast)      omeprazole (PRILOSEC) 20 MG capsule Take 20 mg by mouth Daily      docusate sodium (COLACE) 100 MG capsule Take 100 mg by mouth 2 times daily as needed for Constipation      sucralfate (CARAFATE) 1 GM tablet Take 1 g by mouth 2 times daily       calcitRIOL (ROCALTROL) 0.5 MCG capsule Take 0.5 mcg by mouth daily. levothyroxine (SYNTHROID) 50 MCG tablet Take 50 mcg by mouth Daily. FOLLOW UP/INSTRUCTIONS:  This patient is instructed to follow-up with her primary care physician. Patient is instructed to follow-up with the consults listed above as directed by them. she is instructed to resume home medications and take new medications as indicated in the list above. If the patient has a recurrence of symptoms, she is instructed to go to the ED. Preparing for this patient's discharge, including paperwork, orders, instructions, and meeting with patient did require > 40 minutes.     SONIA Brown CNP     9/5/2022  8:51 AM

## 2022-09-05 NOTE — PROGRESS NOTES
[address] [place] [address] [facility]  Progress Note    CC: COVID, hypoxia  Face to face encounter   SUBJECTIVE:  Patient is tolerating medications. No reported adverse drug reactions. ROS: No nausea, vomiting, diarrhea. Up walking in room- feeling better today   Currently on room air. No fevers. Denies any urinary symptoms currently. Medications:  Scheduled Meds:   vitamin D  50,000 Units Oral Weekly    sodium chloride flush  5-40 mL IntraVENous 2 times per day    gabapentin  300 mg Oral TID    levothyroxine  50 mcg Oral Daily    rivaroxaban  15 mg Oral Daily with breakfast    calcitRIOL  0.5 mcg Oral Daily    cetirizine  10 mg Oral Daily    hydrOXYzine HCl  10 mg Oral Daily    sucralfate  1 g Oral BID    dexamethasone  6 mg IntraVENous Q24H    azithromycin  500 mg Oral Daily    Arformoterol Tartrate  15 mcg Nebulization BID    budesonide  0.5 mg Nebulization BID    ipratropium-albuterol  1 ampule Inhalation Q4H WA    zinc gluconate  50 mg Oral Daily    ascorbic acid  500 mg Oral Daily    vitamin B-6  50 mg Oral Daily    cephALEXin  250 mg Oral Daily    remdesivir IVPB  100 mg IntraVENous Q24H     Continuous Infusions:   dextrose      sodium chloride       PRN Meds:glucose, dextrose bolus **OR** dextrose bolus, glucagon (rDNA), dextrose, sodium chloride flush, sodium chloride, polyethylene glycol, acetaminophen **OR** acetaminophen, benzonatate, traMADol, sodium chloride  OBJECTIVE:  Patient Vitals for the past 24 hrs:   BP Temp Temp src Pulse Resp SpO2   09/05/22 0841 (!) 127/90 98.1 °F (36.7 °C) -- 88 24 98 %   09/04/22 2015 (!) 124/56 97.6 °F (36.4 °C) Oral 56 20 94 %   09/04/22 1928 -- -- -- -- -- 97 %   09/04/22 1510 -- -- -- -- -- 97 %       Physical Exam    Constitutional/General: Alert and oriented, NAD- walking in room  Head: NC/AT  Eyes: PERRL, EOMI  Mouth:  no thrush   Neck: Supple   Pulmonary: Lungs are diminished to bases. Not in respiratory distress.  On 2 liters nasal canula Cardiovascular:  Regular rate and rhythm. Abdomen: Soft, + BS. No distension. Nontender. Extremities: Moves all extremities x 4. Warm and well perfused  Some edema. Bilateral knee replacement - incisions healed well.    Skin: Warm and dry without rash  Neurologic:    No focal deficits  Psych: Normal Affect  PIV       Laboratory and Tests Review:  Lab Results   Component Value Date    WBC 4.5 09/04/2022    WBC 2.2 (L) 09/03/2022    WBC 4.5 09/02/2022    HGB 9.1 (L) 09/04/2022    HCT 30.4 (L) 09/04/2022    MCV 80.2 09/04/2022     09/04/2022     Lab Results   Component Value Date    NEUTROABS 4.14 09/04/2022    NEUTROABS 1.87 09/03/2022    NEUTROABS 3.92 09/02/2022     Lab Results   Component Value Date    CRP 2.1 (H) 09/03/2022    CRP 1.9 (H) 09/02/2022    CRP 0.8 (H) 03/02/2021     Lab Results   Component Value Date    SEDRATE 29 (H) 03/02/2021     Lab Results   Component Value Date    ALT 15 09/04/2022    AST 22 09/04/2022    ALKPHOS 118 (H) 09/04/2022    BILITOT 0.3 09/04/2022     Lab Results   Component Value Date/Time     09/04/2022 08:14 PM    K 4.3 09/04/2022 08:14 PM    K 4.4 09/02/2022 04:10 PM     09/04/2022 08:14 PM    CO2 20 09/04/2022 08:14 PM    BUN 39 09/04/2022 08:14 PM    CREATININE 1.3 09/04/2022 08:14 PM    GFRAA 48 09/04/2022 08:14 PM    LABGLOM 40 09/04/2022 08:14 PM    GLUCOSE 208 09/04/2022 08:14 PM    PROT 6.8 09/04/2022 08:14 PM    LABALBU 3.7 09/04/2022 08:14 PM    CALCIUM 9.6 09/04/2022 08:14 PM    BILITOT 0.3 09/04/2022 08:14 PM    ALKPHOS 118 09/04/2022 08:14 PM    AST 22 09/04/2022 08:14 PM    ALT 15 09/04/2022 08:14 PM      SARS-COV-2 biomarkers    Recent Labs     09/02/22  1610 09/02/22  1720 09/03/22  1609 09/04/22 2014   CRP  --  1.9* 2.1* 1.3*   PROCAL  --   --  0.06  --    FERRITIN  --   --  50  --    LDH  --  238*  --   --    DDIMER  --   --  550  --    AST 23  --  22 22   ALT 12  --  13 15     Lab Results   Component Value Date/Time    CHOL 159 07/14/2015 12:50 PM    TRIG 80 07/14/2015 12:50 PM    HDL 57 12/16/2017 09:39 AM    LDLCALC 94 12/16/2017 09:39 AM    LABVLDL 12 12/16/2017 09:39 AM     Lab Results   Component Value Date/Time    VITD25 51 09/03/2022 04:09 PM     Radiology:  Reviewed     Microbiology:   Urine cx- GNR  MRSA screen- ++   + COVID     ASSESSMENT:  COVID  Shortness of breath - hypoxia   Weakness/ s/p fall  History of recurrent UTI  Asymptomatic bacteruria- patient denies any urinary complaints at this time   MRSA colonization     PLAN:  Continue Remdesivir- Day- #3   Continue Azithromycin- Day #3/5  Also on Keflex 250 mg daily- I had a lengthy discussion with patient- she has been on this dose for years and has been to see urologist- she has recurrent UTIs and reports this help her not have them. I attempted to discuss with patient that this is not an appropriate dose of Keflex and it is not beneficial to stay on it. She wants to continue it and discuss it with her PCP and urologist.   Check cultures  Bactroban to nares   Monitor labs  Encourage IS use  Will not give anything for urine culture- patient denies any current symptoms of UTI  Can d/c from ID POV- after dose of remdesivir today     SONIA Palacios  10:05 AM  9/5/2022       As above    The patient has COVID-19 infection. To prevent self exposure and cross contamination care will be coordinated with patient's care team. All relevant records and diagnostic tests were reviewed in EMR, including laboratory results and imaging. I have directed the medical decision making, and  POC with the NURSE PRACTITIONER, SONIA Palacios. 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 Thank you for involving me in the care of Cyrus Ortiz.  Please do not hesitate to call for any questions or concerns.     Electronically signed by Mickey Davis MD on 9/5/2022 at 3:11 PM

## 2022-09-05 NOTE — DISCHARGE INSTRUCTIONS
Your information:  Name: Greg Styles  : 1945    Your instructions:    YOU ARE BEING DISCHARGED HOME. PLEASE MAKE AND KEEP YOUR FOLLOW UP APPOINTMENTS. What to do after you leave the hospital:    Recommended diet: regular diet    Recommended activity: activity as tolerated    IF YOU EXPERIENCE ANY OF THE FOLLOWING SYMPTOMS, CHEST PAIN, SHORTNESS OF BREATH, COUGHING UP BLOOD OR BLOODY SPUTUM, STOMACH PAIN OR CRAMPING, DARK, TARRY STOOLS, LOSS OF APPETITE, GENERAL NOT FEELING WELL, SIGNS AND SYMPTOMS OF INFECTION LIKE FEVER AND OR CHILLS, PLEASE CALL DR Alejandra Clemens OR RETURN TO THE EMERGENCY ROOM. The following personal items were collected during your admission and were returned to you:    Belongings  Dental Appliances: Uppers, Lowers, At bedside, Partials  Vision - Corrective Lenses: Eyeglasses  Hearing Aid: Bilateral hearing aids, At bedside  Clothing: Shirt, Undergarments, At bedside, Footwear  Jewelry: None  Body Piercings Removed: No  Electronic Devices: Cell Phone,   Weapons (Notify Protective Services/Security): None  Other Valuables: Leroy, At bedside  Home Medications: None  Valuables Given To: Patient  Provide Name(s) of Who Valuable(s) Were Given To: self  Responsible person(s) in the waiting room: n/a  Patient approves for provider to speak to responsible person post operatively: Yes    Information obtained by:  By signing below, I understand that if any problems occur once I leave the hospital I am to contact Dr Monica Cox or go to emergency room. I understand and acknowledge receipt of the instructions indicated above.

## 2022-09-05 NOTE — PROGRESS NOTES
Pt given pt education but would not cooperate w/ blood work, medication administration, assessment and other nursing/lab related care. Gets upset when staffs enters the room.

## 2022-09-05 NOTE — PLAN OF CARE
Problem: Pain  Goal: Verbalizes/displays adequate comfort level or baseline comfort level  Outcome: Adequate for Discharge     Problem: Safety - Adult  Goal: Free from fall injury  Outcome: Adequate for Discharge     Problem: Chronic Conditions and Co-morbidities  Goal: Patient's chronic conditions and co-morbidity symptoms are monitored and maintained or improved  Outcome: Adequate for Discharge     Problem: Cardiovascular - Adult  Goal: Maintains optimal cardiac output and hemodynamic stability  Outcome: Adequate for Discharge     Problem: Cardiovascular - Adult  Goal: Absence of cardiac dysrhythmias or at baseline  Outcome: Adequate for Discharge     Problem: Skin/Tissue Integrity - Adult  Goal: Skin integrity remains intact  Outcome: Adequate for Discharge     Problem: Cardiovascular - Adult  Goal: Absence of cardiac dysrhythmias or at baseline  Outcome: Adequate for Discharge

## 2022-09-06 ENCOUNTER — CARE COORDINATION (OUTPATIENT)
Dept: CARE COORDINATION | Age: 77
End: 2022-09-06

## 2022-09-06 LAB
ORGANISM: ABNORMAL
URINE CULTURE, ROUTINE: ABNORMAL

## 2022-09-06 NOTE — CARE COORDINATION
[facility] Transitions Initial Follow Up Call    Call within 2 business days of discharge: Yes    Patient: Savannah Moreno Patient : 1945   MRN: [id number]  Reason for Admission: [facility] -2022 Covid  Discharge Date: 22 RARS: Readmission Risk Score: 15.2      Last Discharge [facility]       Date Complaint Diagnosis Description Type Department Provider    22 Fall Acute respiratory failure with hypoxia ([facility] [address] . .. ED to Hosp-Admission (Discharged) (ADMITTED) [facility] [address] [facility]; [employer] .. Spoke with: PT,states she is doing ok just very tired. States she is feeling weak when she gets up and is using rollator whenever she is moving around. States she has lost taste and therefore she finds it hard to eat, but states she is increasing water intake. Denies chest pain, fever or dizziness. States she is short of breath with exertion. Pt is on xaralto and reviewed blood thinner precautions. Pt states she has not fallen since getting home from hospital. Is taking all meds per order and denies n/v/d. Pt is to call pcp to schedule follow up. Denies home, med or transportation needs. Ctn info given for future needs Patient contacted regarding COVID-19 diagnosis. Discussed COVID-19 related testing which was available at this time. Test results were positive. Patient informed of results, if available? Yes. Care Transition Nurse contacted the patient by telephone to perform post discharge assessment. Call within 2 business days of discharge: Yes. Verified name and  with patient as identifiers. Provided introduction to self, and explanation of the CTN/ACM role, and reason for call due to risk factors for infection and/or exposure to COVID-19. Symptoms reviewed with patient who verbalized the following symptoms: fatigue  cough  shortness of breath  loss of taste or smell. Due to no new or worsening symptoms encounter was not routed to provider for escalation. Discussed follow-up appointments. If no appointment was previously scheduled, appointment scheduling offered: No.  [facility] follow up appointment(s): No future appointments. Non-[facility] follow up appointment(s): na    Non-face-to-face services provided:  Obtained and reviewed discharge summary and/or continuity of care documents     Advance Care Planning:   Does patient have an Advance Directive:  not on file. Educated patient about risk for severe COVID-19 due to risk factors according to CDC guidelines. CTN reviewed discharge instructions, medical action plan and red flag symptoms with the patient who verbalized understanding. Discussed COVID vaccination status: Yes. Education provided on COVID-19 vaccination as appropriate. Discussed exposure protocols and quarantine with CDC Guidelines. Patient was given an opportunity to verbalize any questions and concerns and agrees to contact CTN or health care provider for questions related to their healthcare. Reviewed and educated patient on any new and changed medications related to discharge diagnosis     Was patient discharged with a pulse oximeter? no      CTN provided contact information. No further follow-up call identified based on severity of symptoms and risk factors. Facility: [address] [place] [address] 24 Hour Call    Do you have a copy of your discharge instructions?: Yes  Do you have all of your prescriptions and are they filled?: Yes  Have you been contacted by a [facility] Pharmacist?: No  Have you scheduled your follow up appointment?: No  Do you feel like you have everything you need to keep you well at home?: Yes  Care Transitions Interventions         Follow Up  No future appointments.     Soco Lowe LPN

## 2023-06-02 ENCOUNTER — APPOINTMENT (OUTPATIENT)
Dept: GENERAL RADIOLOGY | Age: 78
End: 2023-06-02
Payer: COMMERCIAL

## 2023-06-02 ENCOUNTER — HOSPITAL ENCOUNTER (EMERGENCY)
Age: 78
Discharge: HOME | End: 2023-06-02
Payer: COMMERCIAL

## 2023-06-02 VITALS
WEIGHT: 250 LBS | HEIGHT: 69 IN | OXYGEN SATURATION: 96 % | DIASTOLIC BLOOD PRESSURE: 68 MMHG | BODY MASS INDEX: 37.03 KG/M2 | SYSTOLIC BLOOD PRESSURE: 108 MMHG | RESPIRATION RATE: 20 BRPM | TEMPERATURE: 97.8 F | HEART RATE: 92 BPM

## 2023-06-02 DIAGNOSIS — S90.30XA CONTUSION OF FOOT, UNSPECIFIED LATERALITY, INITIAL ENCOUNTER: Primary | ICD-10-CM

## 2023-06-02 PROCEDURE — 73630 X-RAY EXAM OF FOOT: CPT

## 2023-06-02 PROCEDURE — 99211 OFF/OP EST MAY X REQ PHY/QHP: CPT

## 2023-06-02 RX ORDER — AMLODIPINE BESYLATE 5 MG/1
5 TABLET ORAL DAILY
COMMUNITY

## 2023-06-02 RX ORDER — TRAMADOL HYDROCHLORIDE 50 MG/1
50 TABLET ORAL EVERY 6 HOURS PRN
Qty: 10 TABLET | Refills: 0 | Status: SHIPPED | OUTPATIENT
Start: 2023-06-02 | End: 2023-06-05

## 2023-06-02 ASSESSMENT — PAIN DESCRIPTION - LOCATION: LOCATION: FOOT

## 2023-06-02 ASSESSMENT — PAIN DESCRIPTION - ONSET: ONSET: SUDDEN

## 2023-06-02 ASSESSMENT — PAIN DESCRIPTION - DESCRIPTORS: DESCRIPTORS: SHARP

## 2023-06-02 ASSESSMENT — PAIN DESCRIPTION - PAIN TYPE: TYPE: ACUTE PAIN

## 2023-06-02 ASSESSMENT — PAIN SCALES - GENERAL: PAINLEVEL_OUTOF10: 8

## 2023-06-02 ASSESSMENT — PAIN DESCRIPTION - FREQUENCY: FREQUENCY: CONTINUOUS

## 2023-06-02 ASSESSMENT — PAIN DESCRIPTION - ORIENTATION: ORIENTATION: LEFT

## 2023-06-02 ASSESSMENT — PAIN - FUNCTIONAL ASSESSMENT: PAIN_FUNCTIONAL_ASSESSMENT: 0-10

## 2023-06-02 NOTE — ED PROVIDER NOTES
(Please note that portions of this note were completed with a voice recognition program.  Efforts were made to edit the dictations but occasionally words are mis-transcribed.)    SONIA Marx CNP (electronically signed)          SONIA Marx CNP  06/02/23 [id number]

## 2023-07-07 ENCOUNTER — HOSPITAL ENCOUNTER (OUTPATIENT)
Dept: CT IMAGING | Age: 78
Discharge: HOME | End: 2023-07-07
Attending: INTERNAL MEDICINE
Payer: COMMERCIAL

## 2023-07-07 DIAGNOSIS — K55.20 ANGIODYSPLASIA OF THE COLON: ICD-10-CM

## 2023-07-07 DIAGNOSIS — D50.9 IRON DEFICIENCY ANEMIA, UNSPECIFIED IRON DEFICIENCY ANEMIA TYPE: ICD-10-CM

## 2023-07-07 LAB
BUN SERPL-MCNC: 24 MG/DL (ref 6–23)
CREAT SERPL-MCNC: 1.5 MG/DL (ref 0.5–1)

## 2023-07-07 PROCEDURE — 36415 COLL VENOUS BLD VENIPUNCTURE: CPT

## 2023-07-07 PROCEDURE — 74177 CT ABD & PELVIS W/CONTRAST: CPT

## 2023-07-07 PROCEDURE — 6360000004 HC RX CONTRAST MEDICATION: Performed by: RADIOLOGY

## 2023-07-07 PROCEDURE — 2500000003 HC RX 250 WO HCPCS: Performed by: RADIOLOGY

## 2023-07-07 PROCEDURE — 84520 ASSAY OF UREA NITROGEN: CPT

## 2023-07-07 PROCEDURE — 76937 US GUIDE VASCULAR ACCESS: CPT

## 2023-07-07 PROCEDURE — 82565 ASSAY OF CREATININE: CPT

## 2023-07-07 RX ADMIN — IOPAMIDOL 70 ML: 755 INJECTION, SOLUTION INTRAVENOUS at 16:24

## 2023-07-07 RX ADMIN — BARIUM SULFATE 1350 ML: 1 SUSPENSION ORAL at 16:24

## 2023-10-22 ENCOUNTER — HOSPITAL ENCOUNTER (OUTPATIENT)
Age: 78
Discharge: HOME | End: 2023-10-24
Payer: COMMERCIAL

## 2023-10-22 ENCOUNTER — HOSPITAL ENCOUNTER (OUTPATIENT)
Dept: GENERAL RADIOLOGY | Age: 78
Discharge: HOME | End: 2023-10-24
Payer: COMMERCIAL

## 2023-10-22 DIAGNOSIS — M25.551 RIGHT HIP PAIN: ICD-10-CM

## 2023-10-22 PROCEDURE — 73502 X-RAY EXAM HIP UNI 2-3 VIEWS: CPT

## 2023-10-29 ENCOUNTER — APPOINTMENT (OUTPATIENT)
Dept: GENERAL RADIOLOGY | Age: 78
End: 2023-10-29
Payer: COMMERCIAL

## 2023-10-29 ENCOUNTER — APPOINTMENT (OUTPATIENT)
Dept: CT IMAGING | Age: 78
End: 2023-10-29
Payer: COMMERCIAL

## 2023-10-29 ENCOUNTER — HOSPITAL ENCOUNTER (EMERGENCY)
Age: 78
Discharge: HOME | End: 2023-10-29
Payer: COMMERCIAL

## 2023-10-29 VITALS
HEART RATE: 73 BPM | SYSTOLIC BLOOD PRESSURE: 163 MMHG | OXYGEN SATURATION: 97 % | RESPIRATION RATE: 18 BRPM | DIASTOLIC BLOOD PRESSURE: 89 MMHG | TEMPERATURE: 98.8 F

## 2023-10-29 DIAGNOSIS — S70.02XA CONTUSION OF LEFT HIP, INITIAL ENCOUNTER: ICD-10-CM

## 2023-10-29 DIAGNOSIS — S00.83XA CONTUSION OF FACE, INITIAL ENCOUNTER: ICD-10-CM

## 2023-10-29 DIAGNOSIS — S09.90XA CLOSED HEAD INJURY, INITIAL ENCOUNTER: Primary | ICD-10-CM

## 2023-10-29 DIAGNOSIS — S16.1XXA STRAIN OF NECK MUSCLE, INITIAL ENCOUNTER: ICD-10-CM

## 2023-10-29 DIAGNOSIS — S80.01XA CONTUSION OF RIGHT KNEE, INITIAL ENCOUNTER: ICD-10-CM

## 2023-10-29 PROCEDURE — 70450 CT HEAD/BRAIN W/O DYE: CPT

## 2023-10-29 PROCEDURE — 70486 CT MAXILLOFACIAL W/O DYE: CPT

## 2023-10-29 PROCEDURE — 99284 EMERGENCY DEPT VISIT MOD MDM: CPT

## 2023-10-29 PROCEDURE — 73502 X-RAY EXAM HIP UNI 2-3 VIEWS: CPT

## 2023-10-29 PROCEDURE — 72125 CT NECK SPINE W/O DYE: CPT

## 2023-10-29 PROCEDURE — 73080 X-RAY EXAM OF ELBOW: CPT

## 2023-10-29 PROCEDURE — 73552 X-RAY EXAM OF FEMUR 2/>: CPT

## 2023-10-29 PROCEDURE — 73562 X-RAY EXAM OF KNEE 3: CPT

## 2023-11-04 ENCOUNTER — HOSPITAL ENCOUNTER (EMERGENCY)
Age: 78
Discharge: HOME | End: 2023-11-04
Payer: COMMERCIAL

## 2023-11-04 ENCOUNTER — APPOINTMENT (OUTPATIENT)
Dept: GENERAL RADIOLOGY | Age: 78
End: 2023-11-04
Payer: COMMERCIAL

## 2023-11-04 VITALS
BODY MASS INDEX: 33.97 KG/M2 | TEMPERATURE: 98.2 F | RESPIRATION RATE: 18 BRPM | SYSTOLIC BLOOD PRESSURE: 107 MMHG | OXYGEN SATURATION: 100 % | WEIGHT: 230 LBS | HEART RATE: 71 BPM | DIASTOLIC BLOOD PRESSURE: 62 MMHG

## 2023-11-04 DIAGNOSIS — M79.671 RIGHT FOOT PAIN: Primary | ICD-10-CM

## 2023-11-04 PROCEDURE — 73630 X-RAY EXAM OF FOOT: CPT

## 2023-11-04 PROCEDURE — 99211 OFF/OP EST MAY X REQ PHY/QHP: CPT

## 2023-11-04 ASSESSMENT — PAIN DESCRIPTION - DESCRIPTORS: DESCRIPTORS: ACHING;SORE;DISCOMFORT

## 2023-11-04 ASSESSMENT — PAIN DESCRIPTION - LOCATION: LOCATION: FOOT

## 2023-11-04 ASSESSMENT — PAIN - FUNCTIONAL ASSESSMENT: PAIN_FUNCTIONAL_ASSESSMENT: 0-10

## 2023-11-04 ASSESSMENT — PAIN SCALES - GENERAL: PAINLEVEL_OUTOF10: 10

## 2023-11-04 ASSESSMENT — PAIN DESCRIPTION - ORIENTATION: ORIENTATION: RIGHT

## 2024-02-08 ENCOUNTER — OFFICE VISIT (OUTPATIENT)
Dept: ORTHOPEDIC SURGERY | Facility: CLINIC | Age: 79
End: 2024-02-08
Payer: COMMERCIAL

## 2024-02-08 ENCOUNTER — HOSPITAL ENCOUNTER (OUTPATIENT)
Dept: RADIOLOGY | Facility: CLINIC | Age: 79
Discharge: HOME | End: 2024-02-08
Payer: COMMERCIAL

## 2024-02-08 DIAGNOSIS — Z96.641 HISTORY OF RIGHT HIP REPLACEMENT: ICD-10-CM

## 2024-02-08 DIAGNOSIS — M25.552 LEFT HIP PAIN: ICD-10-CM

## 2024-02-08 DIAGNOSIS — M70.71 ISCHIAL BURSITIS OF RIGHT SIDE: Primary | ICD-10-CM

## 2024-02-08 DIAGNOSIS — M70.70 ISCHIAL BURSITIS, UNSPECIFIED LATERALITY: ICD-10-CM

## 2024-02-08 PROCEDURE — 73521 X-RAY EXAM HIPS BI 2 VIEWS: CPT

## 2024-02-08 PROCEDURE — 1157F ADVNC CARE PLAN IN RCRD: CPT | Performed by: ORTHOPAEDIC SURGERY

## 2024-02-08 PROCEDURE — 99213 OFFICE O/P EST LOW 20 MIN: CPT | Mod: 25,27 | Performed by: ORTHOPAEDIC SURGERY

## 2024-02-08 PROCEDURE — 99213 OFFICE O/P EST LOW 20 MIN: CPT | Performed by: ORTHOPAEDIC SURGERY

## 2024-02-08 PROCEDURE — 73523 X-RAY EXAM HIPS BI 5/> VIEWS: CPT | Mod: BILATERAL PROCEDURE | Performed by: RADIOLOGY

## 2024-02-08 PROCEDURE — 1125F AMNT PAIN NOTED PAIN PRSNT: CPT | Performed by: ORTHOPAEDIC SURGERY

## 2024-02-08 PROCEDURE — 1159F MED LIST DOCD IN RCRD: CPT | Performed by: ORTHOPAEDIC SURGERY

## 2024-02-08 ASSESSMENT — PAIN SCALES - GENERAL: PAINLEVEL_OUTOF10: 4

## 2024-02-08 ASSESSMENT — PAIN - FUNCTIONAL ASSESSMENT: PAIN_FUNCTIONAL_ASSESSMENT: 0-10

## 2024-02-08 NOTE — PROGRESS NOTES
Patient is a 79-year-old female presents today for evaluation of right hip pain.  When she locates the pain she locates it to her ischium.  Bothers her sitting.  She previous underwent originally a right hip hemiarthroplasty for femoral neck fracture that was later converted to a total hip replacement.  She had no issues following surgery in regards to wound healing or infection.  She takes Tylenol.    Right hip:  AAOx3, NAD, walks with a non-antalgic gait  No pain with flexion internal rotation  Negative Stinchfield  Mild TTP over trochanter laterally  5/5 Hip flexion/knee extension/df/pf/ehl  SILT in a arron/saph/per/tib distribution  ½ dorsalis pedis and posterior tibial pulse  no popliteal or inguinal lymphadenopathy  no other overlying lesions  mood: euthymic  Respirations non labored  Incision healed  Tender palpation over her ischium    X-rays reviewed by myself today in clinic reveal adequate alignment of the total hip arthroplasty components with no signs of early loosening or failure.    I discussed with her today that her hip looks fine.  Her exam is consistent with ischial bursitis.  Will get her in some physical therapy.  Injections in the future are an option.  This would have to be done under ultrasound.  She does not require anything surgical regarding her hip at this time.  She will follow-up with me on an as-needed basis.    This note was created using voice recognition software and was not corrected for typographical or grammatical errors.

## 2024-02-09 ENCOUNTER — OFFICE VISIT (OUTPATIENT)
Age: 79
End: 2024-02-09
Payer: COMMERCIAL

## 2024-02-09 VITALS
WEIGHT: 248.8 LBS | DIASTOLIC BLOOD PRESSURE: 82 MMHG | BODY MASS INDEX: 36.85 KG/M2 | TEMPERATURE: 97.3 F | SYSTOLIC BLOOD PRESSURE: 150 MMHG | HEIGHT: 69 IN

## 2024-02-09 DIAGNOSIS — G25.3 MYOCLONIC JERKING: ICD-10-CM

## 2024-02-09 DIAGNOSIS — M62.81 MUSCLE WEAKNESS AFFECTING MOVEMENT OF FOOT: ICD-10-CM

## 2024-02-09 DIAGNOSIS — M62.81 HAND MUSCLE WEAKNESS: Primary | ICD-10-CM

## 2024-02-09 DIAGNOSIS — R20.0 BILATERAL NUMBNESS AND TINGLING OF ARMS AND LEGS: ICD-10-CM

## 2024-02-09 DIAGNOSIS — M62.81 HAND MUSCLE WEAKNESS: ICD-10-CM

## 2024-02-09 DIAGNOSIS — R20.2 BILATERAL NUMBNESS AND TINGLING OF ARMS AND LEGS: ICD-10-CM

## 2024-02-09 DIAGNOSIS — R26.0 ATAXIC GAIT: ICD-10-CM

## 2024-02-09 PROCEDURE — 1090F PRES/ABSN URINE INCON ASSESS: CPT | Performed by: PSYCHIATRY & NEUROLOGY

## 2024-02-09 PROCEDURE — 1123F ACP DISCUSS/DSCN MKR DOCD: CPT | Performed by: PSYCHIATRY & NEUROLOGY

## 2024-02-09 PROCEDURE — G8417 CALC BMI ABV UP PARAM F/U: HCPCS | Performed by: PSYCHIATRY & NEUROLOGY

## 2024-02-09 PROCEDURE — 99205 OFFICE O/P NEW HI 60 MIN: CPT | Performed by: PSYCHIATRY & NEUROLOGY

## 2024-02-09 PROCEDURE — 1036F TOBACCO NON-USER: CPT | Performed by: PSYCHIATRY & NEUROLOGY

## 2024-02-09 PROCEDURE — G8400 PT W/DXA NO RESULTS DOC: HCPCS | Performed by: PSYCHIATRY & NEUROLOGY

## 2024-02-09 PROCEDURE — G8427 DOCREV CUR MEDS BY ELIG CLIN: HCPCS | Performed by: PSYCHIATRY & NEUROLOGY

## 2024-02-09 PROCEDURE — G8484 FLU IMMUNIZE NO ADMIN: HCPCS | Performed by: PSYCHIATRY & NEUROLOGY

## 2024-02-09 RX ORDER — GABAPENTIN 300 MG/1
300 CAPSULE ORAL 2 TIMES DAILY
Qty: 60 CAPSULE | Refills: 3 | Status: SHIPPED | OUTPATIENT
Start: 2024-02-09 | End: 2024-06-08

## 2024-02-09 RX ORDER — DULOXETIN HYDROCHLORIDE 30 MG/1
30 CAPSULE, DELAYED RELEASE ORAL DAILY
Qty: 30 CAPSULE | Refills: 2 | Status: SHIPPED | OUTPATIENT
Start: 2024-02-09

## 2024-02-09 NOTE — PROGRESS NOTES
Kyung Ellington MD       Helene W Schwab is a 79 y.o. female presenting as a new patient for a   Chief Complaint   Patient presents with    New Patient    Gait Problem      Chronicity: > 1 year  Onset: several months  Progression since onset: gradually worsening    Balance: balance is off  Tendency to fall backward  Uneven ness in ground/carpeting- trips her    Falls: at least 3 falls in last 6 months  Has fallen backward or forward.     Fallen from edge of bed     Assistance: uses a cane outside home  Has a rollator     Weakness: has neuropathy  Right foot > left foot does not  as much  So trips easily    Has leg weakness and cannot get up from low seated chair   Needs to push herself up.  Needs to use the rail on climbing stairs.     Once she falls- she cannot get up    Drops a lot from hands , sometimes flings things from hands    Numbness: has numbness in nelly feet    Has hypersensitivity in nelly legs till knees.   Constant    Has numbness in hands.    Pain: in right buttock  With activity- it increases and spreads to left side.   On sitting for a long time- pain is worse.   Achy pain  Constant  5/10  It increases to 9/10  Never radiates down the legs    Bladder control: has constant incontinence  At times feels urgency before incontinence    Bowel control: has incontinence      Cause: ?CKD with gfr 35.         Treatment: neurontin 300 mg tid    On xarelto  On vit d/ iron       Component      Latest Ref Rng 9/3/2022   Iron      37 - 145 mcg/dL 25 (L)    TIBC      250 - 450 mcg/dL 323    Iron % Saturation      15 - 50 % 8 (L)    Vitamin B-12      211 - 946 pg/mL 504    FOLATE, FOLAT      4.8 - 24.2 ng/mL 13.4    CRP      0.0 - 0.4 mg/dL 2.1 (H)    Ferritin      ng/mL 50    Vit D, 25-Hydroxy      30 - 100 ng/mL 51       Ct lumbar spine:  2021:  DEGENERATIVE CHANGES: Multilevel degenerative changes with disc space  narrowing, disc desiccation, vacuum disc phenomenon and facet sclerosis.  No  bony spinal

## 2024-02-12 LAB
AMMONIA: 39 UMOL/L (ref 11–51)
ANTI RNP AB: NEGATIVE
ANTI-NUCLEAR ANTIBODY (ANA): NEGATIVE
ANTI-SMITH: NEGATIVE
BORRELIA BURGDORFERI ABS TOTAL: 0.59 IV
C-REACTIVE PROTEIN: 6 MG/L (ref 0–5)
COPPER: 130.9 UG/DL (ref 80–155)
FOLATE: 16.6 NG/ML (ref 4.8–24.2)
HBA1C MFR BLD: 5.5 % (ref 4–5.6)
HIV AG/AB: NONREACTIVE
HOMOCYSTEINE: 32.1 UMOL/L (ref 0–15)
JO-1 ANTIBODY: NEGATIVE
SCLERODERMA (SCL-70) AB: NEGATIVE
SEDIMENTATION RATE, ERYTHROCYTE: 12 MM/HR (ref 0–20)
SJOGREN'S ANTIBODIES (SSA): POSITIVE
SJOGREN'S ANTIBODIES (SSB): NEGATIVE
T4 FREE: 1.3 NG/DL (ref 0.9–1.7)
TSH SERPL DL<=0.05 MIU/L-ACNC: 2.96 UIU/ML (ref 0.27–4.2)
VITAMIN B-12: 283 PG/ML (ref 211–946)
VITAMIN D 25-HYDROXY: 70.9 NG/ML (ref 30–100)

## 2024-02-13 LAB
GLUTAMIC ACID DECARB AB: <5 IU/ML (ref 0–5)
VITAMIN B6: 21 NMOL/L (ref 20–125)

## 2024-02-14 LAB
ALBUMIN (CALCULATED): 3.6 G/DL (ref 3.5–4.7)
ALPHA-1-GLOBULIN: 0.3 G/DL (ref 0.2–0.4)
ALPHA-2-GLOBULIN: 0.7 G/DL (ref 0.5–1)
BETA GLOBULIN: 1.1 G/DL (ref 0.8–1.3)
GAMMA GLOBULIN: 1.4 G/DL (ref 0.7–1.6)
PATHOLOGIST REVIEW: NORMAL
PATHOLOGIST: NORMAL
PROTEIN ELECTROPHORESIS, SERUM: NORMAL
SERUM IFX INTERP: NORMAL
TOTAL PROTEIN: 7.1 G/DL (ref 6.4–8.3)

## 2024-02-15 LAB
ANCA IFA PATTERN: NORMAL
ANCA IFA TITER: NORMAL
MYELOPEROXIDASE AB: 0 AU/ML (ref 0–19)
SERINE PROTEASE 3, IGG: 0 AU/ML (ref 0–19)
VITAMIN B1 WHOLE BLOOD: 147 NMOL/L (ref 70–180)

## 2024-02-16 ENCOUNTER — TELEPHONE (OUTPATIENT)
Age: 79
End: 2024-02-16

## 2024-02-16 LAB
SEND OUT REPORT: NORMAL
TEST NAME: NORMAL

## 2024-02-16 NOTE — TELEPHONE ENCOUNTER
----- Message from Barbara Corona MA sent at 2/15/2024  3:14 PM EST -----  Pt called in stating that she cannot have MRI due to leads not being MRI compatible

## 2024-02-19 ENCOUNTER — APPOINTMENT (OUTPATIENT)
Dept: ORTHOPEDIC SURGERY | Facility: CLINIC | Age: 79
End: 2024-02-19
Payer: COMMERCIAL

## 2024-02-26 ENCOUNTER — TELEPHONE (OUTPATIENT)
Age: 79
End: 2024-02-26

## 2024-02-26 ENCOUNTER — EVALUATION (OUTPATIENT)
Dept: PHYSICAL THERAPY | Age: 79
End: 2024-02-26
Payer: COMMERCIAL

## 2024-02-26 DIAGNOSIS — R20.0 BILATERAL NUMBNESS AND TINGLING OF ARMS AND LEGS: ICD-10-CM

## 2024-02-26 DIAGNOSIS — R26.0 ATAXIC GAIT: ICD-10-CM

## 2024-02-26 DIAGNOSIS — M70.70 ISCHIAL BURSITIS, UNSPECIFIED LATERALITY: Primary | ICD-10-CM

## 2024-02-26 DIAGNOSIS — R20.2 BILATERAL NUMBNESS AND TINGLING OF ARMS AND LEGS: ICD-10-CM

## 2024-02-26 DIAGNOSIS — M62.81 HAND MUSCLE WEAKNESS: ICD-10-CM

## 2024-02-26 DIAGNOSIS — M62.81 MUSCLE WEAKNESS AFFECTING MOVEMENT OF FOOT: Primary | ICD-10-CM

## 2024-02-26 PROCEDURE — 97163 PT EVAL HIGH COMPLEX 45 MIN: CPT | Performed by: PHYSICAL THERAPIST

## 2024-02-26 NOTE — PROGRESS NOTES
Physical Therapy Daily Treatment Note    Date: 2024  Patient Name: Helene W Schwab  : 1945   MRN: [id number]  DOInjury: summer 2023   DOSx: --  Referring Provider: Anibal Loomis MD  [address]  [place] [id number]     Medical Diagnosis:      Diagnosis Orders   1. Ischial bursitis, unspecified laterality          Marisol has ischial and glute tendinopathy pain.  She also has significant LE weakness affecting her ability to use her legs.  Treatment will be 2-pronged -- 1. Continued programming to reduce compression of R ischial tuberosity.  Recommended donut pads to unload ischial tuberosities during sitting.  Also discussed need to avoid low furniture (avoid sitting with hips lower than knee).  2. LE strengthening to treat the tendinopathy problem, but to also address general mobility.      Outcome Measure:   Lower Extremity Functional Scale (LEFS) 75% impairment    X = TO BE PERFORMED NEXT VISIT  > = PROGRESS TO THIS    S: See rolando  O:    Time 6342-2579       Visit   Auth pend Repeat outcome measure at mid point and end.     Pain Pain 0-8/10       ROM        Modalities         Ice     MO   Stretch             TE   Exercise         Bridging 2-leg X     Clamshell R hip X   NR   S-lying hip ABD R hip X   NR      NR         Marching in place >  TE   Alternating side kicks  >   TE    Step up -- FWD  >    TE   Step up -- LAT >    TE   Sit / Stand >    TE           Hallway marching for balance and proprioception   >?    NR   Hallway side stepping for balance and proprioception   >?    NR                                                 A:  see rolando  P: Continue with rehab plan  Kenyon Saravia PT    Treatment Charges: Mins Units   Initial Evaluation 45 1   Re-Evaluation     Ther Exercise         TE     Manual Therapy     MT     Ther Activities        TA     Gait Training          GT     Neuro Re-education NR     Modalities     Non-Billable Service Time     Other     Total Time/Units 45 1

## 2024-02-26 NOTE — PROGRESS NOTES
[facility] OUTPATIENT REHABILITATION  PHYSICAL THERAPY INITIAL EVALUATION         Date:  2024   Patient: Helene W Schwab  : 1945  MRN: [id number]  Referring Provider: Anibal Pandey MD  [facility] [place] [place] [address]     Medical Diagnosis:      Diagnosis Orders   1. Ischial bursitis, unspecified laterality            Physician Order: Eval and Treat      SUBJECTIVE:     Onset date: several months    History / Mechanism of Injury: Developed R ischial pain several months ago insidiously.  Unable to report any causative factors.    Surgical history: R hip hemiarthroplasty 3 years, R NATASHA 1 year ago, R TKA x 2, L TKA, R patella tendon repair, R knee post op infection following tendon repair, R femur fracture with ORIF many years ago.       Chief complaint: pain, poor sitting tolerance    Pain:   Pain 0-8/10    Location: points to ischial tuberosity    Aggravated by: sitting, bending (whole buttocks)    Relieved by: medication, shift away from it    Imaging results: XR hips bilateral 2 VW w pelvis when performed    Result Date: 2024  Interpreted By:  Queenie Sears, STUDY: Single view pelvis. Two views right hip. Two views left hip.    INDICATION: Signs/Symptoms:pain.    COMPARISON: 2022.    ACCESSION NUMBER(S): [id number]    ORDERING CLINICIAN: ANIBAL PANDEY    FINDINGS: No acute fracture or malalignment. Status post right total hip arthroplasty. Hardware is intact without perihardware fractures or lucencies. Left hip joint space is well maintained. Spinal stimulator device noted with leads terminating in the left presacral region. Lower lumbar spine degenerative changes. Unchanged metallic structures projecting over the lower abdomen likely postsurgical in etiology.       1. Right total hip arthroplasty without hardware complication. 2. Additional findings as above.    MACRO: None.    Signed by: Queenie Sears 2024 11:25 AM Dictation

## 2024-02-26 NOTE — TELEPHONE ENCOUNTER
----- Message from Barbara Corona MA sent at 2/26/2024  2:31 PM EST -----  Pt states she only took her Duloxetine for 9 days, made her tired all the time, slept erratically at night. She stopped taking the med. Also wanted to know if there was a CT of the spine she could have done since she can't have the MRI.

## 2024-02-27 NOTE — TELEPHONE ENCOUNTER
I had started her on cymbalta as I was reducing dose of neurontin to avoid myoclonic jerks    Stay on neurontin bid 300 mg only  How are the jerks?    If after cutting down on neurontin, paraesthesias are ok, ok to stay off cymbalta      Kyung Ellington MD

## 2024-02-29 ENCOUNTER — TREATMENT (OUTPATIENT)
Dept: PHYSICAL THERAPY | Age: 79
End: 2024-02-29
Payer: COMMERCIAL

## 2024-02-29 DIAGNOSIS — M70.70 ISCHIAL BURSITIS, UNSPECIFIED LATERALITY: Primary | ICD-10-CM

## 2024-02-29 PROCEDURE — 97110 THERAPEUTIC EXERCISES: CPT

## 2024-02-29 PROCEDURE — 97112 NEUROMUSCULAR REEDUCATION: CPT

## 2024-02-29 NOTE — PROGRESS NOTES
Physical Therapy Daily Treatment Note    Date: 2024  Patient Name: Helene W Schwab  : 1945   MRN: [id number]  DOInjury: summer 2023   DOSx: --  Referring Provider: Anibal Loomis MD  [id number] [place] [address]     Medical Diagnosis:      Diagnosis Orders   1. Ischial bursitis, unspecified laterality          Marisol has ischial and glute tendinopathy pain.  She also has significant LE weakness affecting her ability to use her legs.  Treatment will be 2-pronged -- 1. Continued programming to reduce compression of R ischial tuberosity.  Recommended donut pads to unload ischial tuberosities during sitting.  Also discussed need to avoid low furniture (avoid sitting with hips lower than knee).  2. LE strengthening to treat the tendinopathy problem, but to also address general mobility.      Outcome Measure:   Lower Extremity Functional Scale (LEFS) 75% impairment    X = TO BE PERFORMED NEXT VISIT  > = PROGRESS TO THIS    S: pt reports no new changes since first visit .   O:    Time 5704-3461       Visit   Auth pend Repeat outcome measure at mid point and end.     Pain Pain 0-8/10       ROM        Modalities         Ice     MO   Stretch          Use 4 pillows    TE   Exercise         Bridging 2-leg 1 X 15 Pt's skin is very tender to touch     Clamshell R hip 2 X 10   NR   S-lying hip ABD R hip 2 X 10   NR      NR         Marching in place 2 x 10  TE   Alternating side kicks  2 x 10   TE    Step up -- FWD  >    TE   Step up -- LAT >    TE   Sit / Stand >    TE           Hallway marching for balance and proprioception   >?    NR   Hallway side stepping for balance and proprioception   >?    NR                                                 A:  tolerated well.    P: Continue with rehab plan  Sameer Santana PTA    Treatment Charges: Mins Units   Initial Evaluation     Re-Evaluation     Ther Exercise         TE 15 1   Manual Therapy     MT     Ther Activities        TA     Gait Training

## 2024-03-04 ENCOUNTER — TELEPHONE (OUTPATIENT)
Dept: PHYSICAL THERAPY | Age: 79
End: 2024-03-04

## 2024-03-04 RX ORDER — DULOXETIN HYDROCHLORIDE 30 MG/1
30 CAPSULE, DELAYED RELEASE ORAL DAILY
Qty: 30 CAPSULE | Refills: 2 | Status: SHIPPED | OUTPATIENT
Start: 2024-03-04

## 2024-03-04 NOTE — TELEPHONE ENCOUNTER
The following medication has been approved and sent to your pharmacy    Requested Prescriptions     Signed Prescriptions Disp Refills    DULoxetine (CYMBALTA) 30 MG extended release capsule 30 capsule 2     Sig: Take 1 capsule by mouth daily     Authorizing Provider: PIA MALONEY

## 2024-03-04 NOTE — TELEPHONE ENCOUNTER
Requested Prescriptions     Pending Prescriptions Disp Refills    DULoxetine (CYMBALTA) 30 MG extended release capsule 30 capsule 2     Sig: Take 1 capsule by mouth daily

## 2024-03-06 ENCOUNTER — TREATMENT (OUTPATIENT)
Dept: PHYSICAL THERAPY | Age: 79
End: 2024-03-06
Payer: COMMERCIAL

## 2024-03-06 DIAGNOSIS — M70.70 ISCHIAL BURSITIS, UNSPECIFIED LATERALITY: Primary | ICD-10-CM

## 2024-03-06 PROCEDURE — 97110 THERAPEUTIC EXERCISES: CPT

## 2024-03-06 PROCEDURE — 97112 NEUROMUSCULAR REEDUCATION: CPT

## 2024-03-06 NOTE — PROGRESS NOTES
. Physical Therapy Daily Treatment Note    Date: 3/6/2024  Patient Name: Helene W Schwab  : 1945   MRN: [id number]  DOInjury: summer 2023   DOSx: --  Referring Provider: Anibal Loomis MD  [id number] [place] [address]     Medical Diagnosis:      Diagnosis Orders   1. Ischial bursitis, unspecified laterality          Marisol has ischial and glute tendinopathy pain.  She also has significant LE weakness affecting her ability to use her legs.  Treatment will be 2-pronged -- 1. Continued programming to reduce compression of R ischial tuberosity.  Recommended donut pads to unload ischial tuberosities during sitting.  Also discussed need to avoid low furniture (avoid sitting with hips lower than knee).  2. LE strengthening to treat the tendinopathy problem, but to also address general mobility.      Outcome Measure:   Lower Extremity Functional Scale (LEFS) 75% impairment    X = TO BE PERFORMED NEXT VISIT  > = PROGRESS TO THIS    S: pt reports no new changes since first visit .   O:    Time 1315-5979       Visit   Reference #: [id number]  71370-  units  28835- 24 units  30873-  units  2024 through 2024  Repeat outcome measure at mid point and end.     Pain Pain 0-8/10       ROM        Modalities         Ice     MO   Stretch          Use 4 pillows   pt has a difficult time with lying down for mat ex's . C/o extreme pain next day after therapy    TE   Exercise         Bridging 2-leg Pt's skin is very tender to touch     Clamshell R hip   NR   S-lying hip ABD R hip   NR      NR         Marching in place 2 x 10  TE   Alternating side kicks  2 x 10   TE    Step up -- FWD  >    TE   Step up -- LAT >    TE   Sit / Stand >    TE           Hallway marching for balance and proprioception   >?    NR   Hallway side stepping for balance and proprioception   >?    NR            squats on leg press   40# 3 x 15   pt requested to perform 3/6/2024       Nustep   L 5 x  10 min w/o

## 2024-03-07 ENCOUNTER — HOSPITAL ENCOUNTER (OUTPATIENT)
Dept: RADIOLOGY | Facility: HOSPITAL | Age: 79
Discharge: HOME | End: 2024-03-07
Payer: COMMERCIAL

## 2024-03-07 ENCOUNTER — OFFICE VISIT (OUTPATIENT)
Dept: ORTHOPEDIC SURGERY | Facility: HOSPITAL | Age: 79
End: 2024-03-07
Payer: COMMERCIAL

## 2024-03-07 VITALS — WEIGHT: 242 LBS | BODY MASS INDEX: 35.84 KG/M2 | HEIGHT: 69 IN

## 2024-03-07 DIAGNOSIS — M25.511 RIGHT SHOULDER PAIN, UNSPECIFIED CHRONICITY: ICD-10-CM

## 2024-03-07 PROCEDURE — 73030 X-RAY EXAM OF SHOULDER: CPT | Mod: RIGHT SIDE | Performed by: RADIOLOGY

## 2024-03-07 PROCEDURE — 1036F TOBACCO NON-USER: CPT | Performed by: NURSE PRACTITIONER

## 2024-03-07 PROCEDURE — 2500000005 HC RX 250 GENERAL PHARMACY W/O HCPCS: Performed by: NURSE PRACTITIONER

## 2024-03-07 PROCEDURE — 99213 OFFICE O/P EST LOW 20 MIN: CPT | Performed by: NURSE PRACTITIONER

## 2024-03-07 PROCEDURE — 1157F ADVNC CARE PLAN IN RCRD: CPT | Performed by: NURSE PRACTITIONER

## 2024-03-07 PROCEDURE — 1159F MED LIST DOCD IN RCRD: CPT | Performed by: NURSE PRACTITIONER

## 2024-03-07 PROCEDURE — 2500000004 HC RX 250 GENERAL PHARMACY W/ HCPCS (ALT 636 FOR OP/ED): Performed by: NURSE PRACTITIONER

## 2024-03-07 PROCEDURE — 73030 X-RAY EXAM OF SHOULDER: CPT | Mod: RT

## 2024-03-07 PROCEDURE — 1160F RVW MEDS BY RX/DR IN RCRD: CPT | Performed by: NURSE PRACTITIONER

## 2024-03-07 PROCEDURE — 1125F AMNT PAIN NOTED PAIN PRSNT: CPT | Performed by: NURSE PRACTITIONER

## 2024-03-07 RX ORDER — DULOXETIN HYDROCHLORIDE 30 MG/1
1 CAPSULE, DELAYED RELEASE ORAL DAILY
COMMUNITY
Start: 2024-03-04

## 2024-03-07 RX ORDER — BUMETANIDE 1 MG/1
1 TABLET ORAL DAILY
COMMUNITY

## 2024-03-07 RX ORDER — AMLODIPINE BESYLATE 5 MG/1
1 TABLET ORAL DAILY
COMMUNITY

## 2024-03-07 RX ORDER — CIPROFLOXACIN 500 MG/1
TABLET ORAL EVERY 12 HOURS
COMMUNITY
Start: 2015-10-23

## 2024-03-07 RX ORDER — CEPHALEXIN 250 MG/1
1 TABLET ORAL DAILY
COMMUNITY

## 2024-03-07 RX ORDER — CETIRIZINE HYDROCHLORIDE 10 MG/1
TABLET ORAL
COMMUNITY

## 2024-03-07 RX ORDER — ERGOCALCIFEROL 1.25 1/1
1 CAPSULE ORAL
COMMUNITY

## 2024-03-07 RX ORDER — SULFAMETHOXAZOLE AND TRIMETHOPRIM 800; 160 MG/1; MG/1
1 TABLET ORAL 2 TIMES DAILY
COMMUNITY

## 2024-03-07 RX ORDER — SUCRALFATE 1 G/1
TABLET ORAL
COMMUNITY

## 2024-03-07 RX ORDER — RIVAROXABAN 15 MG/1
1 TABLET, FILM COATED ORAL DAILY
COMMUNITY
Start: 2022-06-01

## 2024-03-07 RX ORDER — FERROUS GLUCONATE 324(38)MG
TABLET ORAL
COMMUNITY
Start: 2024-02-14

## 2024-03-07 RX ORDER — TRAMADOL HYDROCHLORIDE 50 MG/1
50 TABLET ORAL 3 TIMES DAILY PRN
COMMUNITY

## 2024-03-07 RX ORDER — OMEPRAZOLE 20 MG/1
20 CAPSULE, DELAYED RELEASE ORAL
COMMUNITY
Start: 2021-09-23

## 2024-03-07 RX ORDER — GABAPENTIN 300 MG/1
300 CAPSULE ORAL 3 TIMES DAILY
COMMUNITY

## 2024-03-07 RX ORDER — CALCITRIOL 0.5 UG/1
1 CAPSULE ORAL DAILY
COMMUNITY
Start: 2008-11-11

## 2024-03-07 RX ORDER — LEVOTHYROXINE SODIUM 50 UG/1
1 TABLET ORAL
COMMUNITY
Start: 2009-03-11

## 2024-03-07 RX ORDER — VIT A/VIT C/VIT E/ZINC/COPPER 2148-113
TABLET ORAL
COMMUNITY

## 2024-03-07 ASSESSMENT — PAIN - FUNCTIONAL ASSESSMENT: PAIN_FUNCTIONAL_ASSESSMENT: 0-10

## 2024-03-07 ASSESSMENT — PAIN DESCRIPTION - DESCRIPTORS: DESCRIPTORS: ACHING;SHARP

## 2024-03-07 NOTE — PROGRESS NOTES
Provider Impression/Assessment:  Helene Schwab has RIGHT shoulder pain consistent with rotator cuff tendinitis and underlying arthritis.     Patient Discussion/Plan:  At this time I again discussed the diagnosis with the patient. Rotator cuff disease is a spectrum of disorders ranging from rotator cuff tendinitis to full-thickness rotator cuff tears. We know that some patients over the age of 50 will have symptomatic rotator cuff tears due to overuse and general wear and tear. In general most patients who come in with complaints such as these will get better with home therapy, NSAIDs and injections alone. The therapy should be performed 2-3 times a day and should take about 10-15 minutes to perform each time. We have given the patient our written provider directed home exercise program, along with correlating website for home therapy.     They tolerated the injection well today in the RIGHT shoulder. We will see how they do after the injection.     They can be seen again in clinic in 12 weeks for a repeat clinical check as needed. We have made patient a follow up appointment today.     Patient was discussed with Dr Velasquez and he agrees with the above plan. Should you have any questions or concerns after your visit today, please do not hesitate to call the office at [phone]. We strive to give you high-quality, patient-centered, collaborative care and I am honored to be a part of your health care team.     -------------------------------------------------------------------------------------------------  CHIEF COMPLAINT:  NPV RIGHT SHOULDER     History of Present Illness:  3/7/24 - HELENE SCHWAB is a pleasant 79 y.o. female who presents today with right shoulder pain. They were last seen by Dr. Velasquez 11/19/2020 for this same issues. XR today. She is struggling to lift her right shoulder overhead and perform any sudden movements with her right arm. Juanito is struggling to sleep due to shoulder pain. She is  "not able to lay on it. Takes tramadol as needed for pain. She is not taking any OTC medication for her shoulder pain. Pain radiated from her neck to her shoulder. Denies radiation of pain past her elbow. Mild elbow pain. She has not done any physical therapy. Past history of cervical spine injury (10+ years ago) that has lead to chronic right shoulder pain. The left shoulder is not bothering them today.    Reports that she fell in a [employer] in November 2023.     She is scheduled to have a left upper extremity EMG. Seeing a neurologist and getting a CT scan for neuropathy symptoms.    Former smoker  Non diabetic  BMI 35  Xarelto daily    Past medical, family or social histories have been reviewed and they are up to date, see HPI. Family history is not pertinent to the presenting problem.    Review of Systems:   Review of systems for all 14 systems is negative for complaint except for the above noted findings in the history of present illness.    Allergies:  Allergies   Allergen Reactions    Hydromorphone Agitation, Other, Itching, Shortness of breath and Unknown    Morphine Itching and Unknown     Nausea    Oxycodone Shortness of breath    Antihistamines - Alkylamine Other     States she has \"violent nightmares\" from them.    Codeine Other and Unknown     chest pain    Oxycodone-Acetaminophen Itching     Past Medical History:   Diagnosis Date    Personal history of other diseases of the circulatory system 05/16/2014    Personal history of cardiovascular disorder    Personal history of other diseases of the digestive system 05/16/2014    History of gastroesophageal reflux (GERD)    Personal history of other endocrine, nutritional and metabolic disease 05/16/2014    History of hypothyroidism       Past Surgical History:   Procedure Laterality Date    OTHER SURGICAL HISTORY  05/16/2014    Revision Of Total Knee Arthroplasty, All Components     Social History     Socioeconomic History    Marital status:      " Spouse name: Not on file    Number of children: Not on file    Years of education: Not on file    Highest education level: Not on file   Occupational History    Not on file   Tobacco Use    Smoking status: Former     Types: Cigarettes    Smokeless tobacco: Never   Vaping Use    Vaping Use: Never used   Substance and Sexual Activity    Alcohol use: Yes    Drug use: Not Currently     Types: Marijuana    Sexual activity: Not on file   Other Topics Concern    Not on file   Social History Narrative    Not on file     Social Determinants of Health     Financial Resource Strain: Not on file   Food Insecurity: Not on file   Transportation Needs: Not on file   Physical Activity: Not on file   Stress: Not on file   Social Connections: Not on file   Intimate Partner Violence: Not on file   Housing Stability: Not on file       Medications:    Current Outpatient Medications:     calcitriol (Rocaltrol) 0.5 mcg capsule, Take 1 capsule (0.5 mcg) by mouth once daily., Disp: , Rfl:     ciprofloxacin (Cipro) 500 mg tablet, Take by mouth every 12 hours., Disp: , Rfl:     DULoxetine (Cymbalta) 30 mg DR capsule, Take 1 capsule (30 mg) by mouth once daily., Disp: , Rfl:     ferrous gluconate 324 (38 Fe) MG tablet, , Disp: , Rfl:     levothyroxine (Synthroid, Levoxyl) 50 mcg tablet, Take 1 tablet (50 mcg) by mouth once daily in the morning. Take before meals., Disp: , Rfl:     omeprazole (PriLOSEC) 20 mg DR capsule, Take 1 capsule (20 mg) by mouth once daily., Disp: , Rfl:     Xarelto 15 mg tablet, Take 1 tablet (15 mg) by mouth once daily., Disp: , Rfl:     amLODIPine (Norvasc) 5 mg tablet, Take 1 tablet (5 mg) by mouth once daily., Disp: , Rfl:     bumetanide (Bumex) 1 mg tablet, Take 1 tablet (1 mg) by mouth once daily., Disp: , Rfl:     cephalexin (Keftab) 250 mg tablet, Take 1 tablet (250 mg) by mouth once daily., Disp: , Rfl:     cetirizine (ZyrTEC) 10 mg tablet, TAKE ONE TABLET BY MOUTH AT BEDTIME AS NEEDED (ITCH), Disp: , Rfl:      gabapentin (Neurontin) 300 mg capsule, Take 1 capsule (300 mg) by mouth 3 times a day., Disp: , Rfl:     PreserVision AREDS 2,148 mcg-113 mg-45 mg-17.4mg tablet, TAKE AS DIRECTED TWICE DAILY, Disp: , Rfl:     sucralfate (Carafate) 1 gram tablet, TAKE ONE (1) TABLET BY MOUTH TWICE DAILY ON AN EMPTY STOMACH, Disp: , Rfl:     sulfamethoxazole-trimethoprim (Bactrim DS) 800-160 mg tablet, Take 1 tablet by mouth 2 times a day., Disp: , Rfl:     traMADol (Ultram) 50 mg tablet, Take 1 tablet (50 mg) by mouth 3 times a day as needed., Disp: , Rfl:     Vitamin D2 1,250 mcg (50,000 unit) capsule, Take 1 capsule (1,250 mcg) by mouth 1 (one) time per week., Disp: , Rfl:     Family History:  No family history on file.    Diagnoses/Problems:    Physical Examination:  Patient is a well-developed well-nourished [male/female] in no acute distress. Breathes with normal chest rises. Eye exam reveals round pupils. Awake alert and oriented x3.     Examination of LEFT shoulder reveals range of motion 0-170° of forward elevation. 0-60° of external rotation. Internal rotation was to T12.     Examination of RIGHT shoulder reveals skin is intact. No edema. No ecchymosis. No erythema. Active forward elevation to about 160°. External rotation 50°. Internally rotates to L3. Mild palpable pain over acromioclavicular joint. Neurovascular intact distally. Jobes test 4+ out of 5. Neers test: POSITIVE      Results/Imaging:  3/7/24 - Independent review of the imaging today of RIGHT shows no signs of any fracture, or dislocation. Mild glenohumeral arthritis. Calcifications in the rotator cuff. Severe AC arthritis. I personally spent time viewing digital images of the radiology testing with the patient. I explained the results to the patient, along with suggested explanation for follow up recommendations based on testing and clinical results.     Procedure  Risks, benefits and alternatives of injection discussed with the patient prior to injection.  After receiving verbal informed consent from the patient, I sterilely prepped the RIGHT shoulder posteriorly and under sterile conditions injected 40mg of Kenalog and 4ml of 1% lidocaine into the posterior subacromial space. Injection site wiped with a sterile gauze after procedure and Band-Aid placed. The patient tolerated the procedure well and without complication. They can use ice on injection site if discomfort following injection today. Allow 24-48 hours for the injection to start to relieve pain and discomfort of the shoulder. Limit overhead and lifting activities for 48 hours.    L Inj/Asp: R glenohumeral on 3/10/2024 10:40 PM  Indications: pain  Details: 21 G needle, posterior approach  Medications: 40 mg triamcinolone acetonide 40 mg/mL; 4 mL lidocaine 10 mg/mL (1 %)  Procedure, treatment alternatives, risks and benefits explained, specific risks discussed. Consent was given by the patient. Immediately prior to procedure a time out was called to verify the correct patient, procedure, equipment, support staff and site/side marked as required. Patient was prepped and draped in the usual sterile fashion.       *While intending to generate a timely document that accurately reflects the content of the visit, no guarantee can be provided that every grammatical or spelling mistake has been or will be identified or corrected. Thank you for your understanding.

## 2024-03-08 ENCOUNTER — APPOINTMENT (OUTPATIENT)
Dept: ORTHOPEDIC SURGERY | Facility: HOSPITAL | Age: 79
End: 2024-03-08
Payer: COMMERCIAL

## 2024-03-10 PROCEDURE — 2500000004 HC RX 250 GENERAL PHARMACY W/ HCPCS (ALT 636 FOR OP/ED): Performed by: NURSE PRACTITIONER

## 2024-03-10 PROCEDURE — 2500000005 HC RX 250 GENERAL PHARMACY W/O HCPCS: Performed by: NURSE PRACTITIONER

## 2024-03-10 PROCEDURE — 20610 DRAIN/INJ JOINT/BURSA W/O US: CPT | Performed by: NURSE PRACTITIONER

## 2024-03-10 RX ORDER — TRIAMCINOLONE ACETONIDE 40 MG/ML
40 INJECTION, SUSPENSION INTRA-ARTICULAR; INTRAMUSCULAR
Status: COMPLETED | OUTPATIENT
Start: 2024-03-10 | End: 2024-03-10

## 2024-03-10 RX ORDER — LIDOCAINE HYDROCHLORIDE 10 MG/ML
4 INJECTION INFILTRATION; PERINEURAL
Status: COMPLETED | OUTPATIENT
Start: 2024-03-10 | End: 2024-03-10

## 2024-03-10 RX ADMIN — LIDOCAINE HYDROCHLORIDE 4 ML: 10 INJECTION, SOLUTION INFILTRATION; PERINEURAL at 22:40

## 2024-03-10 RX ADMIN — TRIAMCINOLONE ACETONIDE 40 MG: 400 INJECTION, SUSPENSION INTRA-ARTICULAR; INTRAMUSCULAR at 22:40

## 2024-03-12 ENCOUNTER — TREATMENT (OUTPATIENT)
Dept: PHYSICAL THERAPY | Age: 79
End: 2024-03-12
Payer: COMMERCIAL

## 2024-03-12 ENCOUNTER — PROCEDURE VISIT (OUTPATIENT)
Dept: AUDIOLOGY | Age: 79
End: 2024-03-12

## 2024-03-12 DIAGNOSIS — M70.70 ISCHIAL BURSITIS, UNSPECIFIED LATERALITY: Primary | ICD-10-CM

## 2024-03-12 DIAGNOSIS — H90.3 SENSORINEURAL HEARING LOSS, BILATERAL: Primary | ICD-10-CM

## 2024-03-12 PROCEDURE — 97110 THERAPEUTIC EXERCISES: CPT

## 2024-03-12 PROCEDURE — 97112 NEUROMUSCULAR REEDUCATION: CPT

## 2024-03-12 PROCEDURE — 99024 POSTOP FOLLOW-UP VISIT: CPT | Performed by: AUDIOLOGIST

## 2024-03-12 NOTE — PROGRESS NOTES
tolerated well . No increase in c/o end of session.  P: Continue with rehab plan  Sameer Santana PTA    Treatment Charges: Mins Units   Initial Evaluation     Re-Evaluation     Ther Exercise         TE 15 1   Manual Therapy     MT     Ther Activities        TA     Gait Training          GT     Neuro Re-education NR 15 1   Modalities     Non-Billable Service Time     Other     Total Time/Units 30 2

## 2024-03-13 NOTE — PROGRESS NOTES
Patient seen for hearing aid consult.  Signed MICHAEL.  Sent to [facility].  Fax confirmation received.    Sohail Gonzalez CCC/A  Audiologist  [id number]  NPI#:  [id number]

## 2024-03-18 ENCOUNTER — TREATMENT (OUTPATIENT)
Dept: PHYSICAL THERAPY | Age: 79
End: 2024-03-18
Payer: COMMERCIAL

## 2024-03-18 DIAGNOSIS — M70.70 ISCHIAL BURSITIS, UNSPECIFIED LATERALITY: Primary | ICD-10-CM

## 2024-03-18 PROCEDURE — 97110 THERAPEUTIC EXERCISES: CPT

## 2024-03-18 NOTE — PROGRESS NOTES
. Physical Therapy Daily Treatment Note    Date: 3/18/2024  Patient Name: Helene W Schwab  : 1945   MRN: [id number]  DOInjury: summer 2023   DOSx: --  Referring Provider: Anibal Loomis MD  [id number] [place] [address]     Medical Diagnosis:      Diagnosis Orders   1. Ischial bursitis, unspecified laterality          Marisol has ischial and glute tendinopathy pain.  She also has significant LE weakness affecting her ability to use her legs.  Treatment will be 2-pronged -- 1. Continued programming to reduce compression of R ischial tuberosity.  Recommended donut pads to unload ischial tuberosities during sitting.  Also discussed need to avoid low furniture (avoid sitting with hips lower than knee).  2. LE strengthening to treat the tendinopathy problem, but to also address general mobility.      Outcome Measure:   Lower Extremity Functional Scale (LEFS) 75% impairment    X = TO BE PERFORMED NEXT VISIT  > = PROGRESS TO THIS    S: pt reports feeling ok .  O:    Time 1405-4643 am       Visit   Reference #: [id number]  [id number]-  units  53414- 24 units  23602-  units  2024 through 2024  Repeat outcome measure at mid point and end.     Pain Pain 0-8/10       ROM        Modalities         Ice     MO   Stretch          Use 4 pillows   pt has a difficult time with lying down for mat ex's . C/o extreme pain next day after therapy    TE   Exercise         Bridging 2-leg Pt's skin is very tender to touch     Clamshell R hip   NR   S-lying hip ABD R hip   NR      NR         Marching in place 2 x 10  TE   Alternating side kicks  2 x 10   TE    Step up -- FWD  >    TE   Step up -- LAT >    TE   Sit / Stand >    TE           Hallway marching for balance and proprioception   >?    NR   Hallway side stepping for balance and proprioception   >?    NR            squats on leg press   20 # 3-4 x 15   pt requested to perform 3/6/2024       Nustep   L 5 x  10 min w/o arms                          A:

## 2024-03-19 ENCOUNTER — HOSPITAL ENCOUNTER (OUTPATIENT)
Dept: NEUROLOGY | Age: 79
Discharge: HOME | End: 2024-03-19
Payer: COMMERCIAL

## 2024-03-19 ENCOUNTER — TELEPHONE (OUTPATIENT)
Age: 79
End: 2024-03-19

## 2024-03-19 VITALS — HEIGHT: 69 IN | BODY MASS INDEX: 36.73 KG/M2 | WEIGHT: 248 LBS

## 2024-03-19 DIAGNOSIS — G25.3 MYOCLONIC JERKING: ICD-10-CM

## 2024-03-19 PROCEDURE — 95886 MUSC TEST DONE W/N TEST COMP: CPT

## 2024-03-19 PROCEDURE — 95816 EEG AWAKE AND DROWSY: CPT

## 2024-03-19 PROCEDURE — 95911 NRV CNDJ TEST 9-10 STUDIES: CPT

## 2024-03-19 NOTE — PROCEDURES
Helene W Schwab is a 79 y.o. female patient.  1. Myoclonic jerking      Past Medical History:   Diagnosis Date    Arthritis     Atrial fibrillation (HCC)     GERD (gastroesophageal reflux disease)     History of blood transfusion     MI, old     Neuropathy     Renal insufficiency     Sleep difficulties     Thyroid disease      not currently breastfeeding.    Procedures:  EEG routine with video:        Method:   This recording was done using 16 channel of EEG recording and 1 channel of EKG recording. Photic stimulation is performed as an activation procedure. The tracing captures periods of wakefulness.     Interpretation:   There is a posterior dominant rhythm of 8-9 Hz alpha activity. There is moderate amount of fronto central Beta activity seen in this recording. Photic stimulation demonstrates symmetrical occipital driving response bilaterally. There are no epileptiform abnormalities or electrographic seizures in this recording.     EKG demonstrates regular rhythm at 84 bpm.     Summary: This is essentially an unremarkable Electroencephalogram with  no evidence of epileptiform abnormalities or electrographic seizures in this recording.           Kyung Ellington MD  3/19/2024

## 2024-03-19 NOTE — TELEPHONE ENCOUNTER
Her ct's were apparently denied      Will have emg report by Monday.  Can you prior auth ct spine based on it      Kyung Ellington MD

## 2024-03-25 ENCOUNTER — TELEPHONE (OUTPATIENT)
Dept: PHYSICAL THERAPY | Age: 79
End: 2024-03-25

## 2024-04-01 ENCOUNTER — OFFICE VISIT (OUTPATIENT)
Age: 79
End: 2024-04-01
Payer: COMMERCIAL

## 2024-04-01 VITALS
HEART RATE: 71 BPM | HEIGHT: 69 IN | DIASTOLIC BLOOD PRESSURE: 88 MMHG | BODY MASS INDEX: 37.38 KG/M2 | WEIGHT: 252.4 LBS | SYSTOLIC BLOOD PRESSURE: 142 MMHG

## 2024-04-01 DIAGNOSIS — E53.8 VITAMIN B12 DEFICIENCY NEUROPATHY (HCC): ICD-10-CM

## 2024-04-01 DIAGNOSIS — M54.12 CERVICAL RADICULOPATHY AT C7: ICD-10-CM

## 2024-04-01 DIAGNOSIS — M54.17 LUMBOSACRAL RADICULOPATHY: ICD-10-CM

## 2024-04-01 DIAGNOSIS — M35.06 NEUROPATHY DUE TO SJOGREN'S SYNDROME (HCC): Primary | ICD-10-CM

## 2024-04-01 DIAGNOSIS — G63 NEUROPATHY DUE TO SJOGREN'S SYNDROME (HCC): Primary | ICD-10-CM

## 2024-04-01 DIAGNOSIS — G63 VITAMIN B12 DEFICIENCY NEUROPATHY (HCC): ICD-10-CM

## 2024-04-01 PROCEDURE — G8427 DOCREV CUR MEDS BY ELIG CLIN: HCPCS | Performed by: PSYCHIATRY & NEUROLOGY

## 2024-04-01 PROCEDURE — G8400 PT W/DXA NO RESULTS DOC: HCPCS | Performed by: PSYCHIATRY & NEUROLOGY

## 2024-04-01 PROCEDURE — 1090F PRES/ABSN URINE INCON ASSESS: CPT | Performed by: PSYCHIATRY & NEUROLOGY

## 2024-04-01 PROCEDURE — 99214 OFFICE O/P EST MOD 30 MIN: CPT | Performed by: PSYCHIATRY & NEUROLOGY

## 2024-04-01 PROCEDURE — 1123F ACP DISCUSS/DSCN MKR DOCD: CPT | Performed by: PSYCHIATRY & NEUROLOGY

## 2024-04-01 PROCEDURE — G8417 CALC BMI ABV UP PARAM F/U: HCPCS | Performed by: PSYCHIATRY & NEUROLOGY

## 2024-04-01 PROCEDURE — 1036F TOBACCO NON-USER: CPT | Performed by: PSYCHIATRY & NEUROLOGY

## 2024-04-01 NOTE — PROGRESS NOTES
Negative for Murmur, Negative for High Blood Pressure, Negative for Pain in the legs w/exercise, Negative for Blood Clots, Negative for Leg Swelling  Respiratory:  Negative for Shortness of Breath, Negative for Cough/Wheezing, Negativefor Asthma, Negative for Other Lung Problems  Heme/Lymph:  Negative for Swollen Nodes/Glands, Negative for Bleeding Problems, Negative for Anemia  Musculoskeletal:   Negative for Joint Replacement, Negative for Broken Bones  GI:  Negative  for Gallbladder, Negative  for Blood in Stool, Negative for Diarrhea, Negative Intestinal Bleeding, Negative for Poor Appetite, Negative for Hiatal Hernia, Negative for Ulcer, Negative for Hemorrhoids, Negative for Nausea/Vomiting, Negative for Constipation  G/U:  NegativeNegative for Pain/Burning, Negative for Urinary Frequency/Urgency, Negative for Blood in Urine, Negative for Slow/Small Stream, Negative for poor Bladder Emptying, Negative for up at night to urinate, Negativesexual Dysfunction  Endo:  Negative for Cold or Heat Intolerance, Negative for Hot Flashes/Flushing, Negative for Abnormal Thirst, Negative for Change in Body Hair  Skin:   Negative for Lumps or Nodules, Negative for Breast Lumps, Negative for Rashes or Sores   Psych:  Negative for Anxiety/Depression       PHYSICAL EXAM  Nursing note and vitals  reviewed.   Constitutional: she is oriented to person, place, and time and well-developed. Well-nourished, and in no distress.  HENT:       Head:  Normocephalic and atraumatic       Nose:  Nose normal        Mouth/Throat: No oropharyngeal         Eyes: Conjunctivae and EOM are normal. Pupils are equal, round, and reactive to light. Right eye exhibits no discharge. Left eye exhibits no discharge. No scleral icterus.      Neck: Normal range of motion. Neck supple  Cardiovascular:  Normal rate, regular rhythm, normal heart sounds, and intact distal pulses. Exam reveals no gallop and no friction rub. No murmer heard.   Pulmonary/Chest:

## 2024-04-02 ENCOUNTER — TELEPHONE (OUTPATIENT)
Age: 79
End: 2024-04-02

## 2024-04-02 NOTE — TELEPHONE ENCOUNTER
Ct thoracic spine:    Multiple level of thoracic spine degenerative changes  Causing foraminal stenosis    Mild spinal stenosis at t12/l1  Not enough to need surgery      Incidental- thoracic aortic dilation at 4 cm  May need to see vascular surgeon  D/w pcp      Kyung Ellington MD

## 2024-04-03 ENCOUNTER — TELEPHONE (OUTPATIENT)
Age: 79
End: 2024-04-03

## 2024-04-03 NOTE — TELEPHONE ENCOUNTER
----- Message from Barbara Corona MA sent at 4/3/2024  9:21 AM EDT -----  Gave pt info, she states she is uninterested in back injections  ----- Message -----  From: Kyung Ellington MD  Sent: 4/3/2024   8:51 AM EDT  To: Tatiana Guerrero MA; Barbara Corona MA    Let patient know- lumbar spine degenerative changes and disc bulges seen  Causing mild to moderate lumbar spinal stenosis from L2-S1 levels    Will recommend holding off back inj for now    Kyung Ellington MD      
Also found out that neurosurgeon does sural nerve biopsy      If interested, can refer to him for sural nerve biopsy      Kyung Ellington MD    
Mucous membranes moist and pink without lesions; alveolar ridge smooth and edentulous; lip, palate and uvula with acceptable anatomic shape; normal tongue, frenulum and cheek exam; mandible size acceptable.

## 2024-04-04 ENCOUNTER — TELEPHONE (OUTPATIENT)
Dept: AUDIOLOGY | Age: 79
End: 2024-04-04

## 2024-04-04 NOTE — TELEPHONE ENCOUNTER
Patient called and left a voicemail message.   Returned patient message. Left a voicemail at the patient contact number.    Electronically signed by Korin Nuñez on 4/4/2024 at 9:07 AM

## 2024-04-18 ENCOUNTER — TELEPHONE (OUTPATIENT)
Dept: AUDIOLOGY | Age: 79
End: 2024-04-18

## 2024-04-26 ENCOUNTER — TELEPHONE (OUTPATIENT)
Dept: AUDIOLOGY | Age: 79
End: 2024-04-26

## 2024-04-26 NOTE — TELEPHONE ENCOUNTER
----- Message from Camilla Galarza sent at 4/26/2024  7:51 AM EDT -----  Regarding: HA benefits  Reference #: [id number]    Sent to [facility] for approval    Scanned to media

## 2024-06-04 ENCOUNTER — APPOINTMENT (OUTPATIENT)
Dept: ORTHOPEDIC SURGERY | Facility: HOSPITAL | Age: 79
End: 2024-06-04
Payer: COMMERCIAL

## 2024-06-11 ENCOUNTER — APPOINTMENT (OUTPATIENT)
Dept: ORTHOPEDIC SURGERY | Facility: HOSPITAL | Age: 79
End: 2024-06-11
Payer: COMMERCIAL

## 2024-06-12 ENCOUNTER — TELEPHONE (OUTPATIENT)
Age: 79
End: 2024-06-12

## 2024-06-12 NOTE — TELEPHONE ENCOUNTER
S/w Dr. Wilson:    Repeat sjogrens ab: was negative    Nerve biopsy was not revealing    Will suggest doing salivary gland biopsy and dry eyes evaluation      Kyung Ellington MD

## 2024-06-26 ENCOUNTER — TELEPHONE (OUTPATIENT)
Age: 79
End: 2024-06-26

## 2024-06-26 NOTE — TELEPHONE ENCOUNTER
I spoke with rheumatologist in [facility] - Dr. Wilson      He said that the sjogrens test repeated there came back negative  But with severe peripheral neuropathy and ataxia of gait otherwise unexplained- we talked about ways to prove Sjogrens even with negative repeat blood test      So discussed about salivary gland biopsy- that is a simple enough procedure  To confirm sjogrens      That was why it was offered      Kyung Ellington MD

## 2024-06-26 NOTE — TELEPHONE ENCOUNTER
----- Message from Barbara Corona MA sent at 6/24/2024  2:16 PM EDT -----  Pt called in stating that since both tests you ordered came back negative, Dr Martin now wants her to have lip biopsy, pt is hesitant since the other two came back negative, she is wondering if there is a point to having the lip biopsy.

## 2024-08-12 ENCOUNTER — OFFICE VISIT (OUTPATIENT)
Age: 79
End: 2024-08-12
Payer: COMMERCIAL

## 2024-08-12 VITALS
WEIGHT: 249.9 LBS | BODY MASS INDEX: 37.01 KG/M2 | HEIGHT: 69 IN | HEART RATE: 79 BPM | TEMPERATURE: 97.3 F | SYSTOLIC BLOOD PRESSURE: 135 MMHG | DIASTOLIC BLOOD PRESSURE: 78 MMHG

## 2024-08-12 DIAGNOSIS — G63 VITAMIN B12 DEFICIENCY NEUROPATHY (HCC): ICD-10-CM

## 2024-08-12 DIAGNOSIS — E53.8 VITAMIN B12 DEFICIENCY NEUROPATHY (HCC): Primary | ICD-10-CM

## 2024-08-12 DIAGNOSIS — G63 NEUROPATHY DUE TO SJOGREN'S SYNDROME (HCC): ICD-10-CM

## 2024-08-12 DIAGNOSIS — E53.8 VITAMIN B12 DEFICIENCY NEUROPATHY (HCC): ICD-10-CM

## 2024-08-12 DIAGNOSIS — G63 VITAMIN B12 DEFICIENCY NEUROPATHY (HCC): Primary | ICD-10-CM

## 2024-08-12 DIAGNOSIS — M48.061 SPINAL STENOSIS OF LUMBAR REGION, UNSPECIFIED WHETHER NEUROGENIC CLAUDICATION PRESENT: ICD-10-CM

## 2024-08-12 DIAGNOSIS — M54.12 CERVICAL RADICULOPATHY AT C7: ICD-10-CM

## 2024-08-12 DIAGNOSIS — M35.06 NEUROPATHY DUE TO SJOGREN'S SYNDROME (HCC): ICD-10-CM

## 2024-08-12 LAB
FOLATE: 15 NG/ML (ref 4.8–24.2)
VITAMIN B-12: 1111 PG/ML (ref 211–946)

## 2024-08-12 PROCEDURE — 1090F PRES/ABSN URINE INCON ASSESS: CPT | Performed by: PSYCHIATRY & NEUROLOGY

## 2024-08-12 PROCEDURE — G8417 CALC BMI ABV UP PARAM F/U: HCPCS | Performed by: PSYCHIATRY & NEUROLOGY

## 2024-08-12 PROCEDURE — 99214 OFFICE O/P EST MOD 30 MIN: CPT | Performed by: PSYCHIATRY & NEUROLOGY

## 2024-08-12 PROCEDURE — 1036F TOBACCO NON-USER: CPT | Performed by: PSYCHIATRY & NEUROLOGY

## 2024-08-12 PROCEDURE — G8400 PT W/DXA NO RESULTS DOC: HCPCS | Performed by: PSYCHIATRY & NEUROLOGY

## 2024-08-12 PROCEDURE — G8427 DOCREV CUR MEDS BY ELIG CLIN: HCPCS | Performed by: PSYCHIATRY & NEUROLOGY

## 2024-08-12 PROCEDURE — 1123F ACP DISCUSS/DSCN MKR DOCD: CPT | Performed by: PSYCHIATRY & NEUROLOGY

## 2024-08-12 RX ORDER — GABAPENTIN 300 MG/1
300 CAPSULE ORAL 2 TIMES DAILY
Qty: 60 CAPSULE | Refills: 3 | Status: SHIPPED | OUTPATIENT
Start: 2024-08-12 | End: 2024-12-10

## 2024-08-12 NOTE — PROGRESS NOTES
Kyung Ellington MD       Helene W Schwab is a 79 y.o. female presenting as a follow patient for a   Chief Complaint   Patient presents with    Follow-up    Results      Had the sural nerve biopsy.  Persistent numbness in right lateral foot.   Problems with driving       Chronicity: > 1 year  Onset: several months  Progression since onset: balance still an issue      Emg:  Electrodiagnosis: Extensive electrodiagnostic examination of left upper and lower extremities reveals the following:     Predominantly chronic generalized sensorimotor peripheral neuropathy of the large fiber type, axon loss in type with secondary demyelination features, very severe in degree based on the degree of sensory and motor fiber loss in the lower extremities. It is mild in degree in the upper extremities.   There is evidence of a superimposed chronic left C7/8 motor radiculopathy, axon loss in type, at least moderate in degree electrically based on the degree of motor fiber loss.   Active on chronic motor axon loss changes in left AH and medial gastrocnemius muscles with chronic motor axon loss change in left short head of biceps femoris muscle most likely is secondary to above mentioned very severe peripheral neuropathy but a superimposed left active on chronic left S1 motor radiculopathy cannot be excluded.      Eeg: normal      Component      Latest Ref Rng 2/9/2024   Total Protein      6.4 - 8.3 g/dL 7.1    Albumin (calculated)      3.5 - 4.7 g/dL 3.6    Alpha-1-Globulin      0.2 - 0.4 g/dL 0.3    Alpha-2-Globulin      0.5 - 1.0 g/dL 0.7    Beta Globulin      0.8 - 1.3 g/dL 1.1    Gamma Globulin      0.7 - 1.6 g/dL 1.4    Protein Electrophoresis, Serum Normal serum protein electrophoresis. No monoclonal protein identified.    Pathologist Reviewed by: NAVEED Durbin    Anti-Henry      NEGATIVE  NEGATIVE    Sjogren's Antibodies (SSA)      NEGATIVE  POSITIVE !    Sjogren's Antibodies (SSB)      NEGATIVE  NEGATIVE    Anti RNP

## 2024-08-16 LAB
MICROORGANISM SPEC CULT: ABNORMAL
SPECIMEN DESCRIPTION: ABNORMAL

## 2024-11-12 ENCOUNTER — TELEPHONE (OUTPATIENT)
Dept: AUDIOLOGY | Age: 79
End: 2024-11-12

## 2024-11-12 NOTE — TELEPHONE ENCOUNTER
Patient called and left a voicemail message.   Returned patient message. Left a voicemail at the patient contact number.    Electronically signed by Korin Nuñez on 11/12/2024 at 11:44 AM

## 2024-12-03 ENCOUNTER — OFFICE VISIT (OUTPATIENT)
Age: 79
End: 2024-12-03
Payer: COMMERCIAL

## 2024-12-03 VITALS
BODY MASS INDEX: 38.2 KG/M2 | WEIGHT: 258.7 LBS | HEART RATE: 90 BPM | SYSTOLIC BLOOD PRESSURE: 156 MMHG | DIASTOLIC BLOOD PRESSURE: 86 MMHG

## 2024-12-03 DIAGNOSIS — M48.061 SPINAL STENOSIS OF LUMBAR REGION, UNSPECIFIED WHETHER NEUROGENIC CLAUDICATION PRESENT: ICD-10-CM

## 2024-12-03 DIAGNOSIS — M35.06 NEUROPATHY DUE TO SJOGREN'S SYNDROME (HCC): ICD-10-CM

## 2024-12-03 DIAGNOSIS — G63 VITAMIN B12 DEFICIENCY NEUROPATHY (HCC): Primary | ICD-10-CM

## 2024-12-03 DIAGNOSIS — M54.12 CERVICAL RADICULOPATHY AT C7: ICD-10-CM

## 2024-12-03 DIAGNOSIS — G63 NEUROPATHY DUE TO SJOGREN'S SYNDROME (HCC): ICD-10-CM

## 2024-12-03 DIAGNOSIS — E53.8 VITAMIN B12 DEFICIENCY NEUROPATHY (HCC): Primary | ICD-10-CM

## 2024-12-03 DIAGNOSIS — N31.9 NEUROGENIC BLADDER: ICD-10-CM

## 2024-12-03 PROCEDURE — G8417 CALC BMI ABV UP PARAM F/U: HCPCS | Performed by: PSYCHIATRY & NEUROLOGY

## 2024-12-03 PROCEDURE — 1090F PRES/ABSN URINE INCON ASSESS: CPT | Performed by: PSYCHIATRY & NEUROLOGY

## 2024-12-03 PROCEDURE — G8427 DOCREV CUR MEDS BY ELIG CLIN: HCPCS | Performed by: PSYCHIATRY & NEUROLOGY

## 2024-12-03 PROCEDURE — 99214 OFFICE O/P EST MOD 30 MIN: CPT | Performed by: PSYCHIATRY & NEUROLOGY

## 2024-12-03 PROCEDURE — G8484 FLU IMMUNIZE NO ADMIN: HCPCS | Performed by: PSYCHIATRY & NEUROLOGY

## 2024-12-03 PROCEDURE — 1123F ACP DISCUSS/DSCN MKR DOCD: CPT | Performed by: PSYCHIATRY & NEUROLOGY

## 2024-12-03 PROCEDURE — G8400 PT W/DXA NO RESULTS DOC: HCPCS | Performed by: PSYCHIATRY & NEUROLOGY

## 2024-12-03 PROCEDURE — 1036F TOBACCO NON-USER: CPT | Performed by: PSYCHIATRY & NEUROLOGY

## 2024-12-03 RX ORDER — CITRIC ACID/SODIUM CITRATE 334-500MG
15 SOLUTION, ORAL ORAL 2 TIMES DAILY
COMMUNITY
Start: 2024-05-13

## 2024-12-03 RX ORDER — GABAPENTIN 300 MG/1
300 CAPSULE ORAL 2 TIMES DAILY
Qty: 180 CAPSULE | Refills: 1 | Status: SHIPPED | OUTPATIENT
Start: 2024-12-03 | End: 2025-06-01

## 2024-12-03 NOTE — PROGRESS NOTES
her     Falls: no new falls   Has fallen backward or forward.      Fallen from edge of bed      Assistance: uses a cane outside home  Has a rollator      Weakness: has neuropathy  Right foot > left foot does not  as much  So trips easily  Cannot lift right leg      Has leg weakness and cannot get up from low seated chair   Needs to push herself up.  Needs to use the rail on climbing stairs.   Needs to physically lift the right leg up   Once she falls- she cannot get up     Drops a lot from hands , sometimes flings things from hands  Misjudging putting things.      Numbness: has numbness in nelly feet  Worse after nerve biopsy    Has numbness in nelly legs till knees. With hypersensitivity   Constant     Has numbness in hands.     Pain: affects nelly back  Localized in lower back  Worse on getting up in am  Achy pain  5/10  Never radiates down the legs  Improves with walking      Bladder control: has constant incontinence  At times feels urgency before incontinence     Bowel control: has incontinence          Cause: ?CKD with gfr 35.   Sjogren's disease , negative on repeat testing   B12 def with hyper homocystinemia               Ct lumbar spine:      The remainder of the visualized thoracolumbar cord and thecal sac are  unremarkable.  Mild to moderate spinal canal stenosis is seen at L2-3,  secondary to the disc bulge, facet arthropathy, and ligamentum flavum  hypertrophy.  Mild-to-moderate spinal canal stenosis is seen at L3-4,  secondary to the disc bulge, facet arthropathy, and ligamentum flavum  hypertrophy.  Mild to moderate spinal canal stenosis is seen at L4-5,  secondary to the disc bulge, facet arthropathy, and ligamentum flavum  hypertrophy.  Mild-to-moderate spinal canal stenosis is seen at L5-S1,  secondary to the disc bulge, facet arthropathy, and ligamentum flavum  hypertrophy.     SOFT TISSUES/RETROPERITONEUM: No paraspinal mass is seen.  Subcentimeter  hemorrhagic cysts are noted in the left

## 2024-12-23 ENCOUNTER — OFFICE VISIT (OUTPATIENT)
Dept: SURGERY | Age: 79
End: 2024-12-23
Payer: COMMERCIAL

## 2024-12-23 VITALS
TEMPERATURE: 97.1 F | HEIGHT: 69 IN | HEART RATE: 94 BPM | SYSTOLIC BLOOD PRESSURE: 152 MMHG | BODY MASS INDEX: 38.21 KG/M2 | OXYGEN SATURATION: 94 % | WEIGHT: 258 LBS | DIASTOLIC BLOOD PRESSURE: 78 MMHG

## 2024-12-23 DIAGNOSIS — L98.9 SKIN LESION: Primary | ICD-10-CM

## 2024-12-23 PROCEDURE — G8417 CALC BMI ABV UP PARAM F/U: HCPCS | Performed by: PHYSICIAN ASSISTANT

## 2024-12-23 PROCEDURE — 1036F TOBACCO NON-USER: CPT | Performed by: PHYSICIAN ASSISTANT

## 2024-12-23 PROCEDURE — G8400 PT W/DXA NO RESULTS DOC: HCPCS | Performed by: PHYSICIAN ASSISTANT

## 2024-12-23 PROCEDURE — 1090F PRES/ABSN URINE INCON ASSESS: CPT | Performed by: PHYSICIAN ASSISTANT

## 2024-12-23 PROCEDURE — 1123F ACP DISCUSS/DSCN MKR DOCD: CPT | Performed by: PHYSICIAN ASSISTANT

## 2024-12-23 PROCEDURE — G8427 DOCREV CUR MEDS BY ELIG CLIN: HCPCS | Performed by: PHYSICIAN ASSISTANT

## 2024-12-23 PROCEDURE — G8484 FLU IMMUNIZE NO ADMIN: HCPCS | Performed by: PHYSICIAN ASSISTANT

## 2024-12-23 RX ORDER — TRAMADOL HYDROCHLORIDE 50 MG/1
50 TABLET ORAL EVERY 6 HOURS PRN
COMMUNITY

## 2024-12-23 RX ORDER — LIDOCAINE HYDROCHLORIDE AND EPINEPHRINE 10; 10 MG/ML; UG/ML
2 INJECTION, SOLUTION INFILTRATION; PERINEURAL ONCE
Status: COMPLETED | OUTPATIENT
Start: 2024-12-23 | End: 2024-12-23

## 2024-12-23 RX ADMIN — LIDOCAINE HYDROCHLORIDE AND EPINEPHRINE 2 ML: 10; 10 INJECTION, SOLUTION INFILTRATION; PERINEURAL at 13:29

## 2024-12-23 NOTE — PROGRESS NOTES
Results   Component Value Date/Time     09/04/2022 08:14 PM    K 4.3 09/04/2022 08:14 PM    K 4.4 09/02/2022 04:10 PM     09/04/2022 08:14 PM    CO2 20 09/04/2022 08:14 PM    BUN 24 07/07/2023 01:55 PM    CREATININE 1.5 07/07/2023 01:55 PM    CALCIUM 9.6 09/04/2022 08:14 PM    GFRAA 48 09/04/2022 08:14 PM    LABGLOM 35 07/07/2023 01:55 PM    GLUCOSE 208 09/04/2022 08:14 PM       Radiology Review:  No radiology needed at this time  Pathology Review: No Pathology reviewed       IMPRESSION/RECOMMENDATIONS:        Diagnosis  -) Left dorsal wrist raised lesion of uncertain behavior    -We will plan to proceed and have the left wrist biopsied to rule out malignancy.    -The risks, benefits and options were discussed with the pt. The risks included but not limited to pain, bleeding, infection, heavy scarring, damage to surrounding structures, fluid collections, asymmetry, and need for further procedures. All of Her questions were answered to their satisfaction and She agrees to proceed with the procedure.    -After consent was obtained, the area was cleansed with an alcohol swab. Local anesthesia consisting of 1% lidocaine with 1:100,000 epinepherine was injected  surrounding the area. The local was allowed to work.  Using a 10-blade the lesion was shaved, hemostasis was obtained and a bandage was placed.  The procedure was performed by me.    I have discussed with the patient that they should make every attempt to follow-up within this time interval in the event that the pathology or radiographic findings require further attention such as surgical or other medical intervention.  They voiced complete understanding.    The patient was educated to keep the bandage in place and apply bacitracin to the wound site BID.  OK to shower at this time.  Advised the patient that they can allow soap and water to rinse of the wound site while showering.  Once they are done in the shower they are to pat dry the incision site

## 2024-12-30 LAB — SURGICAL PATHOLOGY REPORT: NORMAL

## 2025-01-06 NOTE — PROGRESS NOTES
Subjective:    Follow up today from shave biopsy left wrist lesion of uncertain behavior. Denies fever, nausea, vomiting, leg pain or swelling, pain is absent.  The pt states that they have  kept the wound site(s) covered with bacitracin.    They voice no complaints.     Objective:    Vitals:    01/09/25 1007   BP: 130/82   Pulse: 64   Resp: 20   Temp: 97 °F (36.1 °C)   SpO2: 99%           Left dorsal wrist  wound: Clean, dry, and intact, no drainage.  No signs of infection, wound healing well        Assessment:    Patient Active Problem List   Diagnosis    Generalized abdominal pain    Diarrhea    Atrial fibrillation (HCC)    Hypothyroid    Acute renal failure (ARF) (HCC)    Acute hypoxemic respiratory failure due to COVID-19    COVID-19    Ischial bursitis       Labs: CBC:   Lab Results   Component Value Date/Time    WBC 4.5 09/04/2022 08:14 PM    RBC 3.79 09/04/2022 08:14 PM    HGB 9.1 09/04/2022 08:14 PM    HCT 30.4 09/04/2022 08:14 PM    MCV 80.2 09/04/2022 08:14 PM    MCH 24.0 09/04/2022 08:14 PM    MCHC 29.9 09/04/2022 08:14 PM    RDW 17.3 09/04/2022 08:14 PM     09/04/2022 08:14 PM    MPV 12.0 09/04/2022 08:14 PM     BMP:    Lab Results   Component Value Date/Time     09/04/2022 08:14 PM    K 4.3 09/04/2022 08:14 PM    K 4.4 09/02/2022 04:10 PM     09/04/2022 08:14 PM    CO2 20 09/04/2022 08:14 PM    BUN 24 07/07/2023 01:55 PM    CREATININE 1.5 07/07/2023 01:55 PM    CALCIUM 9.6 09/04/2022 08:14 PM    GFRAA 48 09/04/2022 08:14 PM    LABGLOM 35 07/07/2023 01:55 PM    GLUCOSE 208 09/04/2022 08:14 PM         Pathology Report- Path Number: [id number]     -- Diagnosis --   Skin, 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  Pt voices understanding      -The patient was counseled on their

## 2025-01-09 ENCOUNTER — OFFICE VISIT (OUTPATIENT)
Dept: SURGERY | Age: 80
End: 2025-01-09

## 2025-01-09 ENCOUNTER — TELEPHONE (OUTPATIENT)
Dept: SURGERY | Age: 80
End: 2025-01-09

## 2025-01-09 VITALS
DIASTOLIC BLOOD PRESSURE: 82 MMHG | SYSTOLIC BLOOD PRESSURE: 130 MMHG | OXYGEN SATURATION: 99 % | TEMPERATURE: 97 F | RESPIRATION RATE: 20 BRPM | HEART RATE: 64 BPM

## 2025-01-09 DIAGNOSIS — C44.92 SCC (SQUAMOUS CELL CARCINOMA): Primary | ICD-10-CM

## 2025-01-13 ENCOUNTER — PREP FOR PROCEDURE (OUTPATIENT)
Dept: SURGERY | Age: 80
End: 2025-01-13

## 2025-01-13 DIAGNOSIS — C44.92 SQUAMOUS CELL CARCINOMA OF SKIN: ICD-10-CM

## 2025-01-15 RX ORDER — METOPROLOL SUCCINATE 25 MG/1
25 TABLET, EXTENDED RELEASE ORAL DAILY
COMMUNITY

## 2025-01-15 RX ORDER — FERROUS SULFATE 324(65)MG
324 TABLET, DELAYED RELEASE (ENTERIC COATED) ORAL
COMMUNITY

## 2025-01-15 RX ORDER — ERGOCALCIFEROL 1.25 MG/1
50000 CAPSULE, LIQUID FILLED ORAL WEEKLY
COMMUNITY
End: 2025-01-15

## 2025-01-15 NOTE — PROGRESS NOTES
[facility]   PRE-ADMISSION TESTING GENERAL INSTRUCTIONS  PAT Phone Number: [phone]      GENERAL INSTRUCTIONS:    [x] Antibacterial Soap Shower Night before AND the Morning of procedure.  [x] Do not wear makeup, lotions, powders, deodorant the morning of surgery.  [x] No solid food after midnight. You may have SIPS of clear liquids up until 2 hours before your arrival time to the hospital.   [x] You may brush your teeth, gargle, but do not swallow water.   [x] No tobacco products, illegal drugs, or alcohol within 24 hours of your surgery.  [x] Jewelry or valuables should not be brought to the hospital. All body and/or tongue piercing's must be removed prior to arriving to hospital. No contact lens or hair pins.   [x] Arrange transportation with a responsible adult  to and from the hospital. Arrange for someone to be with you for the remainder of the day and for 24 hours after your procedure due to having had anesthesia.          -Who will be your  for transportation? son        -Who will be staying with you for 24 hrs after your procedure? Son or grand-daughter  [x] Bring insurance card and photo ID.  [x] Bring copy of living will or healthcare power of  papers to be placed in your electronic record.       PARKING INSTRUCTIONS:     [x] ARRIVAL DATE & TIME: 1/21 at 6 am  [x] Times are subject to change. We will contact you the business day before surgery if that were to occur.  [x] Enter into the [facility] Entrance. Two people may accompany you. Masks are not required.  [x] Parking Lot \"I\" is where you will park. It is located on the corner of [address] and [address]. The entrance is on [address].   Only one vehicle - per patient, is permitted in parking lot.   Upon entering the parking lot, a voucher ticket will print.        MEDICATION INSTRUCTIONS:    [x] Bring a complete list of your medications, please write the last time you took the

## 2025-01-20 NOTE — PROGRESS NOTES
Spoke with Kita at Dr. Perea's office calling as following up on a request for office to fax over documentation of pacemaker check which was done December of 2024.  She states she will follow up and fax once she has the documentation.  Provided her with PAT fax number.

## 2025-01-21 ENCOUNTER — ANESTHESIA (OUTPATIENT)
Dept: OPERATING ROOM | Age: 80
End: 2025-01-21
Payer: COMMERCIAL

## 2025-01-21 ENCOUNTER — ANESTHESIA EVENT (OUTPATIENT)
Dept: OPERATING ROOM | Age: 80
End: 2025-01-21
Payer: COMMERCIAL

## 2025-01-21 ENCOUNTER — HOSPITAL ENCOUNTER (OUTPATIENT)
Age: 80
Setting detail: OUTPATIENT SURGERY
Discharge: HOME | End: 2025-01-21
Attending: PLASTIC SURGERY | Admitting: PLASTIC SURGERY
Payer: COMMERCIAL

## 2025-01-21 VITALS
OXYGEN SATURATION: 95 % | HEART RATE: 80 BPM | BODY MASS INDEX: 36.88 KG/M2 | WEIGHT: 249 LBS | DIASTOLIC BLOOD PRESSURE: 70 MMHG | HEIGHT: 69 IN | SYSTOLIC BLOOD PRESSURE: 137 MMHG | RESPIRATION RATE: 16 BRPM | TEMPERATURE: 97.6 F

## 2025-01-21 DIAGNOSIS — C44.92 SQUAMOUS CELL CARCINOMA OF SKIN: ICD-10-CM

## 2025-01-21 PROCEDURE — 88305 TISSUE EXAM BY PATHOLOGIST: CPT

## 2025-01-21 PROCEDURE — 2580000003 HC RX 258: Performed by: PLASTIC SURGERY

## 2025-01-21 PROCEDURE — 6360000002 HC RX W HCPCS

## 2025-01-21 PROCEDURE — 3700000001 HC ADD 15 MINUTES (ANESTHESIA): Performed by: PLASTIC SURGERY

## 2025-01-21 PROCEDURE — 6360000002 HC RX W HCPCS: Performed by: PLASTIC SURGERY

## 2025-01-21 PROCEDURE — 3600000014 HC SURGERY LEVEL 4 ADDTL 15MIN: Performed by: PLASTIC SURGERY

## 2025-01-21 PROCEDURE — 11603 EXC TR-EXT MAL+MARG 2.1-3 CM: CPT | Performed by: PLASTIC SURGERY

## 2025-01-21 PROCEDURE — 2500000003 HC RX 250 WO HCPCS: Performed by: PLASTIC SURGERY

## 2025-01-21 PROCEDURE — 2580000003 HC RX 258: Performed by: NURSE ANESTHETIST, CERTIFIED REGISTERED

## 2025-01-21 PROCEDURE — 13121 CMPLX RPR S/A/L 2.6-7.5 CM: CPT | Performed by: PLASTIC SURGERY

## 2025-01-21 PROCEDURE — 2709999900 HC NON-CHARGEABLE SUPPLY: Performed by: PLASTIC SURGERY

## 2025-01-21 PROCEDURE — 7100000010 HC PHASE II RECOVERY - FIRST 15 MIN: Performed by: PLASTIC SURGERY

## 2025-01-21 PROCEDURE — 3600000004 HC SURGERY LEVEL 4 BASE: Performed by: PLASTIC SURGERY

## 2025-01-21 PROCEDURE — 3700000000 HC ANESTHESIA ATTENDED CARE: Performed by: PLASTIC SURGERY

## 2025-01-21 PROCEDURE — 7100000011 HC PHASE II RECOVERY - ADDTL 15 MIN: Performed by: PLASTIC SURGERY

## 2025-01-21 RX ORDER — SODIUM CHLORIDE 9 MG/ML
INJECTION, SOLUTION INTRAVENOUS CONTINUOUS
Status: DISCONTINUED | OUTPATIENT
Start: 2025-01-21 | End: 2025-01-21 | Stop reason: HOSPADM

## 2025-01-21 RX ORDER — CEPHALEXIN 500 MG/1
500 CAPSULE ORAL 2 TIMES DAILY
Qty: 10 CAPSULE | Refills: 0 | Status: SHIPPED | OUTPATIENT
Start: 2025-01-21 | End: 2025-01-26

## 2025-01-21 RX ORDER — SODIUM CHLORIDE 9 MG/ML
INJECTION, SOLUTION INTRAVENOUS
Status: DISCONTINUED | OUTPATIENT
Start: 2025-01-21 | End: 2025-01-21 | Stop reason: SDUPTHER

## 2025-01-21 RX ORDER — LIDOCAINE HYDROCHLORIDE AND EPINEPHRINE 10; 10 MG/ML; UG/ML
INJECTION, SOLUTION INFILTRATION; PERINEURAL PRN
Status: DISCONTINUED | OUTPATIENT
Start: 2025-01-21 | End: 2025-01-21 | Stop reason: ALTCHOICE

## 2025-01-21 RX ORDER — SODIUM CHLORIDE 0.9 % (FLUSH) 0.9 %
5-40 SYRINGE (ML) INJECTION PRN
Status: DISCONTINUED | OUTPATIENT
Start: 2025-01-21 | End: 2025-01-21 | Stop reason: HOSPADM

## 2025-01-21 RX ORDER — FENTANYL CITRATE 50 UG/ML
INJECTION, SOLUTION INTRAMUSCULAR; INTRAVENOUS
Status: DISCONTINUED | OUTPATIENT
Start: 2025-01-21 | End: 2025-01-21 | Stop reason: SDUPTHER

## 2025-01-21 RX ORDER — MIDAZOLAM HYDROCHLORIDE 1 MG/ML
INJECTION, SOLUTION INTRAMUSCULAR; INTRAVENOUS
Status: DISCONTINUED | OUTPATIENT
Start: 2025-01-21 | End: 2025-01-21 | Stop reason: SDUPTHER

## 2025-01-21 RX ORDER — ONDANSETRON 4 MG/1
4 TABLET, ORALLY DISINTEGRATING ORAL 3 TIMES DAILY PRN
Qty: 9 TABLET | Refills: 0 | Status: SHIPPED | OUTPATIENT
Start: 2025-01-21 | End: 2025-01-24

## 2025-01-21 RX ORDER — PROPOFOL 10 MG/ML
INJECTION, EMULSION INTRAVENOUS
Status: DISCONTINUED | OUTPATIENT
Start: 2025-01-21 | End: 2025-01-21 | Stop reason: SDUPTHER

## 2025-01-21 RX ORDER — SODIUM CHLORIDE 9 MG/ML
INJECTION, SOLUTION INTRAVENOUS PRN
Status: DISCONTINUED | OUTPATIENT
Start: 2025-01-21 | End: 2025-01-21 | Stop reason: HOSPADM

## 2025-01-21 RX ORDER — SODIUM CHLORIDE 0.9 % (FLUSH) 0.9 %
5-40 SYRINGE (ML) INJECTION EVERY 12 HOURS SCHEDULED
Status: DISCONTINUED | OUTPATIENT
Start: 2025-01-21 | End: 2025-01-21 | Stop reason: HOSPADM

## 2025-01-21 RX ADMIN — FENTANYL CITRATE 50 MCG: 50 INJECTION, SOLUTION INTRAMUSCULAR; INTRAVENOUS at 08:29

## 2025-01-21 RX ADMIN — MIDAZOLAM 1 MG: 1 INJECTION INTRAMUSCULAR; INTRAVENOUS at 08:26

## 2025-01-21 RX ADMIN — FENTANYL CITRATE 50 MCG: 50 INJECTION, SOLUTION INTRAMUSCULAR; INTRAVENOUS at 08:25

## 2025-01-21 RX ADMIN — SODIUM CHLORIDE: 9 INJECTION, SOLUTION INTRAVENOUS at 07:29

## 2025-01-21 RX ADMIN — SODIUM CHLORIDE: 9 INJECTION, SOLUTION INTRAVENOUS at 08:22

## 2025-01-21 RX ADMIN — CEFAZOLIN 2000 MG: 2 INJECTION, POWDER, FOR SOLUTION INTRAMUSCULAR; INTRAVENOUS at 08:25

## 2025-01-21 RX ADMIN — SODIUM CHLORIDE: 9 INJECTION, SOLUTION INTRAVENOUS at 07:48

## 2025-01-21 RX ADMIN — PROPOFOL 75 MCG/KG/MIN: 10 INJECTION, EMULSION INTRAVENOUS at 08:25

## 2025-01-21 ASSESSMENT — LIFESTYLE VARIABLES: SMOKING_STATUS: 0

## 2025-01-21 ASSESSMENT — PAIN - FUNCTIONAL ASSESSMENT
PAIN_FUNCTIONAL_ASSESSMENT: NONE - DENIES PAIN
PAIN_FUNCTIONAL_ASSESSMENT: 0-10

## 2025-01-21 ASSESSMENT — PAIN DESCRIPTION - DESCRIPTORS: DESCRIPTORS: OTHER (COMMENT)

## 2025-01-21 NOTE — OP NOTE
marked and measured and 4-mm margins were taken circumferentially around the lesion.  The area was then anesthetized with 10 cc 1% lidocaine with 100,000 epinephrine and allowed to work.  A 15 blade was used to incise full thickness through the skin and the lesion was sharply lifted off the underlying subcutaneous tissue. Hemostasis was achieved with monopolar cautery.  The area was assessed for the best manner of reconstruction.    The area could be closed by complex means.  With the aid of double hooks the lateral edges were extensively undermined to allow for midline advancement and closure under minimal tension.  Dogears were trimmed at the ends of the incision for a more optimal contour.  The wound was then closed with 3-0 Vicryl deep dermal and superficially with 5-0 Fast gut in a running-locking fashion .  Dressing was accomplished with Bacitracin and Kerlix.    The patient emerged from anesthesia without complication and taken to PACU in good condition.    Dr. Cruz  was present and participated in all critical portions of the procedure.    Electronically signed by Eduard Jj DO on 1/21/2025 at 9:02 AM

## 2025-01-21 NOTE — ANESTHESIA PRE PROCEDURE
results found for: \"ABORH\", \"LABANTI\"    Drug/Infectious Status (If Applicable):  No results found for: \"HIV\", \"HEPCAB\"    COVID-19 Screening (If Applicable):   Lab Results   Component Value Date/Time    COVID19 DETECTED 09/03/2022 06:10 PM           Anesthesia Evaluation  Patient summary reviewed and Nursing notes reviewed   no history of anesthetic complications:   Airway: Mallampati: III          Dental:    (+) upper dentures and partials      Pulmonary:   (+) pneumonia (Acute hypoxemic respiratory failure due to COVID-19): resolved,               (-) not a current smoker (former, quit 1983)          Patient did not smoke on day of surgery.                 Cardiovascular:  Exercise tolerance: poor (<4 METS)  (+) pacemaker: pacemaker, past MI: > 6 months, dysrhythmias: atrial fibrillation      ECG reviewed               Beta Blocker:  Dose within 24 Hrs        PE comment: paced   Neuro/Psych:   Negative Neuro/Psych ROS              GI/Hepatic/Renal:   (+) hiatal hernia, GERD: well controlled, renal disease (GFR 35-40%): CRI and kidney stones, morbid obesity         ROS comment: GASTRIC BYPASS SURGERY.   Endo/Other:    (+) hypothyroidism::., malignancy/cancer (skin).                 Abdominal:   (+) obese    Abdomen: soft.      Vascular:          Other Findings:         Anesthesia Plan      MAC     ASA 3       Induction: intravenous.      Anesthetic plan and risks discussed with patient.    Use of blood products discussed with patient whom consented to blood products.    Plan discussed with CRNA and attending.                  IMMANUEL FANG RN   1/21/2025

## 2025-01-21 NOTE — PROGRESS NOTES
CLINICAL PHARMACY NOTE: MEDS TO BEDS    Total # of Prescriptions Filled: 2   The following medications were delivered to the patient:  Cephalexin 500mg  Zofran 4mg odt    Additional Documentation:  Family picked up in pharmacy 1-21-25

## 2025-01-21 NOTE — DISCHARGE INSTRUCTIONS
Discharge Home.   Call office to schedule a follow-up appointment at [phone]  Please call to schedule an appointment to be seen In our office on 1/29/25   Diet: regular diet  Activity: ambulate in house and no Strenuous exercise for 1 week  Shower/Bathing:  You can shower in 48 hours over the incision site.  At that time you can allow soap and water to rinse off the incision site while showering.  Once you are done in the shower you can pat dry the incision site with a clean paper towel.  No baths, hot tubs or soaking of the wound site at this time.   Dressings /Splint /Wound Care:Keep wound clean and dry.  Apply bacitracin to the wound site two times daily.  Driving: It is OK to drive if the following criteria are met:   1- Not taking narcotic pain medication   2- Not distracted by pain or limited ROM   3- Not a hazard to self or others on the road  Medications: As prescribed.  Educated to contact physician at [phone] with concerns or signs of infection (redness, increasing pain, discharge, odor, fever).

## 2025-01-21 NOTE — H&P
Helene W Schwab was seen and re-examined preoperatively today, January 21, 2025.  There was no substantial change in her physical and medical status. All Meds and Family/Social/Previous history was reviewed and there were no significant changes. Patient is fit for the proposed surgical procedure.  All questions were appropriately addressed and had no further questions regarding the risks, benefits, and alternatives of the procedure.  Helene W Schwab and family wished to proceed.    Vitals  Blood pressure (!) 174/80, pulse 80, temperature 97.3 °F (36.3 °C), temperature source Temporal, resp. rate 20, height 1.753 m (5' 9\"), weight 112.9 kg (249 lb), SpO2 96%, not currently breastfeeding.    Eduard Jj DO  Resident Physician  [facility]  Otolaryngology Residency  1/21/2025  8:25 AM    
Take 1 capsule by mouth daily      rivaroxaban (XARELTO) 15 MG TABS tablet Take 1 tablet by mouth daily (with breakfast)      omeprazole (PRILOSEC) 20 MG capsule Take 1 capsule by mouth Daily      sucralfate (CARAFATE) 1 GM tablet Take 1 tablet by mouth 2 times daily      calcitRIOL (ROCALTROL) 0.5 MCG capsule Take 1 capsule by mouth daily      levothyroxine (SYNTHROID) 50 MCG tablet Take 1 tablet by mouth Daily         Allergies:  Dilaudid [hydromorphone hcl], Hydromorphone, Ciprofloxacin, Codeine, Hydrocodone-acetaminophen, Percodan [oxycodone-aspirin], and Vicodin [hydrocodone-acetaminophen]    Social History:   Social History     Socioeconomic History    Marital status:      Spouse name: Not on file    Number of children: Not on file    Years of education: Not on file    Highest education level: Not on file   Occupational History    Not on file   Tobacco Use    Smoking status: Former     Current packs/day: 0.00     Types: Cigarettes     Quit date:      Years since quittin.0    Smokeless tobacco: Never   Vaping Use    Vaping status: Never Used   Substance and Sexual Activity    Alcohol use: Yes     Comment: occasional    Drug use: No    Sexual activity: Never   Other Topics Concern    Not on file   Social History Narrative    Not on file     Social Determinants of Health     Financial Resource Strain: Not on file   Food Insecurity: Not on file   Transportation Needs: Not on file   Physical Activity: Not on file   Stress: Not on file   Social Connections: Not on file   Intimate Partner Violence: Not on file   Housing Stability: Not on file     Family History:   Family History   Problem Relation Age of Onset    Heart Failure Mother     Cancer Mother     Macular Degen Father     Cancer Sister        REVIEW OF SYSTEMS:    CONSTITUTIONAL:  negative for  fevers, chills, sweats and fatigue  EYES: negative for dipolpia or acute vision loss.   RESPIRATORY:  negative for  dry cough, cough with sputum,

## 2025-01-21 NOTE — ANESTHESIA POSTPROCEDURE EVALUATION
Department of Anesthesiology  Postprocedure Note    Patient: Helene W Schwab  MRN: [id number]  YOB: 1945  Date of evaluation: 1/21/2025    Procedure Summary       Date: 01/21/25 Room / Location: [address]    Anesthesia Start: 0822 Anesthesia Stop:     Procedure: Excision of Squamous Cell Carcinoma of Left Wrist With Possible Full Thickness Skin Graft (Left) Diagnosis:       Squamous cell carcinoma of skin      (Squamous cell carcinoma of skin [C44.92])    Surgeons: Rusty Cruz MD Responsible Provider: Toney Jack MD    Anesthesia Type: MAC ASA Status: 3            Anesthesia Type: No value filed.    Mario Phase I: Mario Score: 10    Mario Phase II:      Anesthesia Post Evaluation    Patient location during evaluation: PACU  Patient participation: complete - patient participated  Level of consciousness: awake  Pain score: 3  Airway patency: patent  Nausea & Vomiting: no nausea and no vomiting  Cardiovascular status: blood pressure returned to baseline  Respiratory status: acceptable  Hydration status: euvolemic      No notable events documented.

## 2025-01-23 LAB — SURGICAL PATHOLOGY REPORT: NORMAL

## 2025-01-28 ENCOUNTER — OFFICE VISIT (OUTPATIENT)
Dept: SURGERY | Age: 80
End: 2025-01-28

## 2025-01-28 VITALS — HEART RATE: 69 BPM | TEMPERATURE: 97.1 F | OXYGEN SATURATION: 96 %

## 2025-01-28 DIAGNOSIS — C44.92 SQUAMOUS CELL CARCINOMA OF SKIN: Primary | ICD-10-CM

## 2025-01-28 PROCEDURE — 99024 POSTOP FOLLOW-UP VISIT: CPT | Performed by: PHYSICIAN ASSISTANT

## 2025-01-28 NOTE — PROGRESS NOTES
Subjective:    Follow up today from  Excision of Squamous Cell Carcinoma of Left Wrist With Complex Closure - Left 1/21/2025 . Denies fever, nausea, vomiting, leg pain or swelling, pain is absent.  The pt states that they have  kept the wound site(s) covered as directed.    They state that their pain is absent.     Objective:    Pulse 69   Temp 97.1 °F (36.2 °C) (Temporal)   SpO2 96%       Wound: Clean, dry, and intact, no drainage.  No signs of infection, wound healing well. Sutures intact  Neuro- Sensation  intact to lucas incisional site with light touch .      Assessment:    Patient Active Problem List   Diagnosis    Generalized abdominal pain    Diarrhea    Atrial fibrillation (HCC)    Hypothyroid    Acute renal failure (ARF) (HCC)    Acute hypoxemic respiratory failure due to COVID-19    COVID-19    Ischial bursitis    Squamous cell carcinoma of skin       Labs: CBC:   Lab Results   Component Value Date/Time    WBC 4.5 09/04/2022 08:14 PM    RBC 3.79 09/04/2022 08:14 PM    HGB 9.1 09/04/2022 08:14 PM    HCT 30.4 09/04/2022 08:14 PM    MCV 80.2 09/04/2022 08:14 PM    MCH 24.0 09/04/2022 08:14 PM    MCHC 29.9 09/04/2022 08:14 PM    RDW 17.3 09/04/2022 08:14 PM     09/04/2022 08:14 PM    MPV 12.0 09/04/2022 08:14 PM     BMP:    Lab Results   Component Value Date/Time     09/04/2022 08:14 PM    K 4.3 09/04/2022 08:14 PM    K 4.4 09/02/2022 04:10 PM     09/04/2022 08:14 PM    CO2 20 09/04/2022 08:14 PM    BUN 24 07/07/2023 01:55 PM    CREATININE 1.5 07/07/2023 01:55 PM    CALCIUM 9.6 09/04/2022 08:14 PM    GFRAA 48 09/04/2022 08:14 PM    LABGLOM 35 07/07/2023 01:55 PM    GLUCOSE 208 09/04/2022 08:14 PM         Pathology Report- Path Number: [id number]     -- Diagnosis --   Skin of left arm, re-excision:   Focal residual squamous cell carcinoma (0.1 cm) identified within   superficial reticular dermis.   Associated histologic changes consistent with healing biopsy site.   All inked excision

## 2025-02-03 ENCOUNTER — PROCEDURE VISIT (OUTPATIENT)
Dept: AUDIOLOGY | Age: 80
End: 2025-02-03

## 2025-02-03 DIAGNOSIS — H90.3 SENSORINEURAL HEARING LOSS (SNHL) OF BOTH EARS: Primary | ICD-10-CM

## 2025-02-03 PROCEDURE — 99024 POSTOP FOLLOW-UP VISIT: CPT

## 2025-02-03 NOTE — PROGRESS NOTES
The patient came in for a hearing aid evaluation.  Hearing test results were reviewed and the patient is a candidate for hearing aids.  After all information needed is collected a prior authorization will be submitted to the patient's insurance.  The patient will be contacted regarding approval or denial.  Hearing aids were selected and will be ordered if an approval is received.    Arden Mcgheeo [id number]  Champagne  Length 3  M vent Korin Urena/CCC-A  [place] Lic [id number]  Electronically signed by Korin Helton on 2/3/2025 at 1:55 PM

## 2025-03-06 ENCOUNTER — PROCEDURE VISIT (OUTPATIENT)
Dept: AUDIOLOGY | Age: 80
End: 2025-03-06

## 2025-03-06 DIAGNOSIS — H90.3 SENSORINEURAL HEARING LOSS, BILATERAL: Primary | ICD-10-CM

## 2025-03-06 PROCEDURE — 99024 POSTOP FOLLOW-UP VISIT: CPT | Performed by: AUDIOLOGIST

## 2025-03-06 NOTE — PROGRESS NOTES
Fit with binaural  [employer]  hearing aids. Instructed in use and care. Gave  battery charger, warranty information and scheduled  2 week check for 3/20/25.  Made following adjustments: feedback manager/global gain 95%.     Patient was satisfied and will follow up on above date, unless problems arise.     No charge visit today. Will bill at 30 day follow up per [place] Medicaid guidelines.     Sohail Gonzalez CCC/DORY  Audiologist  [id number]  NPI#:  [id number]      Electronically signed by Korin Nuñez on 3/6/2025 at 1:06 PM

## 2025-03-18 ENCOUNTER — TELEPHONE (OUTPATIENT)
Dept: AUDIOLOGY | Age: 80
End: 2025-03-18

## 2025-03-18 NOTE — TELEPHONE ENCOUNTER
Patient left voice mail to reschedule 3/20/25 audiology appointment, to 3/19/25.    New appointment date and time left on voice mail, for patient.    Sohail Gonzalez CCC/A  Audiologist  [id number]  NPI#:  [id number]

## 2025-03-19 ENCOUNTER — PROCEDURE VISIT (OUTPATIENT)
Dept: AUDIOLOGY | Age: 80
End: 2025-03-19

## 2025-03-19 DIAGNOSIS — H90.3 SENSORINEURAL HEARING LOSS, BILATERAL: Primary | ICD-10-CM

## 2025-03-19 PROCEDURE — 99024 POSTOP FOLLOW-UP VISIT: CPT | Performed by: AUDIOLOGIST

## 2025-03-19 NOTE — PROGRESS NOTES
Saw patient for 2-week hearing aid check.  Only issue is with streaming.  Contacted [employer] tech support to address streaming issue.    Patient's domes changed to vented, small due to retention issues.    Patient scheduled for 30-day hearing aid check/billing.    Sohail Gonzalez CCC/A  Audiologist  [id number]  NPI#:  [id number]

## 2025-04-09 ENCOUNTER — PROCEDURE VISIT (OUTPATIENT)
Dept: AUDIOLOGY | Age: 80
End: 2025-04-09

## 2025-04-09 DIAGNOSIS — H90.3 SENSORINEURAL HEARING LOSS, BILATERAL: Primary | ICD-10-CM

## 2025-04-09 NOTE — PROGRESS NOTES
The patient came in for a 30 day hearing aid follow up with billing, per [place] Medicaid Guidelines.  Patient reported they are doing well with the hearing aids.   Changes to hearing aids today: N/A. Counseled patient on: time of streaming affect on recharge-ability.    . Patient is satisfied with the hearing aids and therefore billing to be done today per medicaid guidelines.     Approval number: [id number].      Patient to return as needed.    Sohail Gonzalez CCC/DORY  Audiologist  [id number]  NPI#:  [id number]      Electronically signed by Korin Nuñez on 4/9/2025 at 11:12 AM

## 2025-05-18 ENCOUNTER — APPOINTMENT (OUTPATIENT)
Dept: GENERAL RADIOLOGY | Age: 80
End: 2025-05-18
Payer: COMMERCIAL

## 2025-05-18 ENCOUNTER — HOSPITAL ENCOUNTER (EMERGENCY)
Age: 80
Discharge: HOME | End: 2025-05-18
Attending: FAMILY MEDICINE
Payer: COMMERCIAL

## 2025-05-18 VITALS
HEART RATE: 70 BPM | OXYGEN SATURATION: 97 % | WEIGHT: 245 LBS | BODY MASS INDEX: 36.18 KG/M2 | SYSTOLIC BLOOD PRESSURE: 148 MMHG | DIASTOLIC BLOOD PRESSURE: 75 MMHG | TEMPERATURE: 97.9 F | RESPIRATION RATE: 16 BRPM

## 2025-05-18 DIAGNOSIS — M79.672 LEFT FOOT PAIN: Primary | ICD-10-CM

## 2025-05-18 PROCEDURE — 73630 X-RAY EXAM OF FOOT: CPT

## 2025-05-18 PROCEDURE — 99283 EMERGENCY DEPT VISIT LOW MDM: CPT

## 2025-05-18 ASSESSMENT — PAIN DESCRIPTION - DESCRIPTORS: DESCRIPTORS: BURNING

## 2025-05-18 ASSESSMENT — PAIN - FUNCTIONAL ASSESSMENT: PAIN_FUNCTIONAL_ASSESSMENT: 0-10

## 2025-05-18 ASSESSMENT — PAIN DESCRIPTION - ONSET: ONSET: SUDDEN

## 2025-05-18 ASSESSMENT — PAIN DESCRIPTION - ORIENTATION: ORIENTATION: LEFT

## 2025-05-18 ASSESSMENT — PAIN DESCRIPTION - PAIN TYPE: TYPE: ACUTE PAIN

## 2025-05-18 ASSESSMENT — PAIN SCALES - GENERAL: PAINLEVEL_OUTOF10: 10

## 2025-05-18 ASSESSMENT — PAIN DESCRIPTION - LOCATION: LOCATION: FOOT

## 2025-05-18 NOTE — ED PROVIDER NOTES
Urgent Care Encounter       Patient: Helene W Schwab  MRN: [id number]  : 1945  Date of Evaluation: 2025  ED Provider: Rema Diane MD    Chief Complaint       Chief Complaint   Patient presents with    Foot Injury     Pt states her phone fell on her left foot yesterday States there is now a \"big knot on the top of my foot\"     [place]     Helene W Schwab is a 80 y.o. female who presents to the [facility] [place] (Urgent care Facility)    Patient presents with injury to her left foot, states that she dropped her phone on her left foot at the dorsal lateral aspect of the foot along the fourth and the fifth digit and that injury has caused her direct trauma with appreciable swelling and some bruising that can be appreciated on the dorsal lateral aspect of the left foot.  Range of motion of her toes is intact however she does experience some discomfort and has not been able to put full weight on the left foot, she does use a wheeled walker for ambulation chronically.  I did advise that we will get imaging to evaluate for any abnormalities.  Imaging does not reveal a fracture and likely the patient has a contusion.  I did advise rest elevation and icing.    ROS:     At least 6 systems reviewed and otherwise acutely negative except as in the [place].  Review of Systems      Past History     Past Medical History:   Diagnosis Date    Arthritis     Atrial fibrillation (HCC)     GERD (gastroesophageal reflux disease)     History of blood transfusion     MI, old     Neuropathy     Renal insufficiency     Sleep difficulties     Thyroid disease      Past Surgical History:   Procedure Laterality Date    ABDOMEN SURGERY      APPENDECTOMY      CHOLECYSTECTOMY      FRACTURE SURGERY Right     femur    GASTRIC BYPASS SURGERY      JOINT REPLACEMENT Bilateral     knee    PACEMAKER PLACEMENT      Dr. Perea-2018    SKIN GRAFT Left 2025    Excision of Squamous Cell Carcinoma of Left Wrist

## 2025-05-27 ENCOUNTER — APPOINTMENT (OUTPATIENT)
Dept: GENERAL RADIOLOGY | Age: 80
End: 2025-05-27
Payer: COMMERCIAL

## 2025-05-27 ENCOUNTER — APPOINTMENT (OUTPATIENT)
Dept: ULTRASOUND IMAGING | Age: 80
End: 2025-05-27
Payer: COMMERCIAL

## 2025-05-27 ENCOUNTER — HOSPITAL ENCOUNTER (EMERGENCY)
Age: 80
Discharge: HOME | End: 2025-05-27
Payer: COMMERCIAL

## 2025-05-27 VITALS
TEMPERATURE: 97.8 F | RESPIRATION RATE: 16 BRPM | SYSTOLIC BLOOD PRESSURE: 134 MMHG | OXYGEN SATURATION: 92 % | HEART RATE: 98 BPM | DIASTOLIC BLOOD PRESSURE: 71 MMHG

## 2025-05-27 DIAGNOSIS — S90.32XA CONTUSION OF LEFT FOOT, INITIAL ENCOUNTER: Primary | ICD-10-CM

## 2025-05-27 LAB
ALBUMIN SERPL-MCNC: 4.2 G/DL (ref 3.5–5.2)
ALP SERPL-CCNC: 131 U/L (ref 35–104)
ALT SERPL-CCNC: 8 U/L (ref 0–32)
ANION GAP SERPL CALCULATED.3IONS-SCNC: 15 MMOL/L (ref 7–16)
AST SERPL-CCNC: 18 U/L (ref 0–31)
BASOPHILS # BLD: 0.06 K/UL (ref 0–0.2)
BASOPHILS NFR BLD: 1 % (ref 0–2)
BILIRUB SERPL-MCNC: 0.8 MG/DL (ref 0–1.2)
BUN SERPL-MCNC: 21 MG/DL (ref 6–23)
CALCIUM SERPL-MCNC: 10.3 MG/DL (ref 8.6–10.2)
CHLORIDE SERPL-SCNC: 102 MMOL/L (ref 98–107)
CO2 SERPL-SCNC: 21 MMOL/L (ref 22–29)
CREAT SERPL-MCNC: 1.4 MG/DL (ref 0.5–1)
EOSINOPHIL # BLD: 0.23 K/UL (ref 0.05–0.5)
EOSINOPHILS RELATIVE PERCENT: 4 % (ref 0–6)
ERYTHROCYTE [DISTWIDTH] IN BLOOD BY AUTOMATED COUNT: 13.2 % (ref 11.5–15)
GFR, ESTIMATED: 38 ML/MIN/1.73M2
GLUCOSE SERPL-MCNC: 87 MG/DL (ref 74–99)
HCT VFR BLD AUTO: 40.5 % (ref 34–48)
HGB BLD-MCNC: 12.4 G/DL (ref 11.5–15.5)
IMM GRANULOCYTES # BLD AUTO: <0.03 K/UL (ref 0–0.58)
IMM GRANULOCYTES NFR BLD: 0 % (ref 0–5)
LACTATE BLDV-SCNC: 1.1 MMOL/L (ref 0.5–2.2)
LYMPHOCYTES NFR BLD: 1.04 K/UL (ref 1.5–4)
LYMPHOCYTES RELATIVE PERCENT: 17 % (ref 20–42)
MCH RBC QN AUTO: 28.4 PG (ref 26–35)
MCHC RBC AUTO-ENTMCNC: 30.6 G/DL (ref 32–34.5)
MCV RBC AUTO: 92.7 FL (ref 80–99.9)
MONOCYTES NFR BLD: 0.47 K/UL (ref 0.1–0.95)
MONOCYTES NFR BLD: 8 % (ref 2–12)
NEUTROPHILS NFR BLD: 71 % (ref 43–80)
NEUTS SEG NFR BLD: 4.4 K/UL (ref 1.8–7.3)
PLATELET # BLD AUTO: 205 K/UL (ref 130–450)
PMV BLD AUTO: 10.7 FL (ref 7–12)
POTASSIUM SERPL-SCNC: 3.9 MMOL/L (ref 3.5–5)
PROT SERPL-MCNC: 7.8 G/DL (ref 6.4–8.3)
RBC # BLD AUTO: 4.37 M/UL (ref 3.5–5.5)
SODIUM SERPL-SCNC: 138 MMOL/L (ref 132–146)
WBC OTHER # BLD: 6.2 K/UL (ref 4.5–11.5)

## 2025-05-27 PROCEDURE — 6370000000 HC RX 637 (ALT 250 FOR IP): Performed by: PHYSICIAN ASSISTANT

## 2025-05-27 PROCEDURE — 80053 COMPREHEN METABOLIC PANEL: CPT

## 2025-05-27 PROCEDURE — 93971 EXTREMITY STUDY: CPT

## 2025-05-27 PROCEDURE — 99284 EMERGENCY DEPT VISIT MOD MDM: CPT

## 2025-05-27 PROCEDURE — 73630 X-RAY EXAM OF FOOT: CPT

## 2025-05-27 PROCEDURE — 83605 ASSAY OF LACTIC ACID: CPT

## 2025-05-27 PROCEDURE — 85025 COMPLETE CBC W/AUTO DIFF WBC: CPT

## 2025-05-27 RX ORDER — ACETAMINOPHEN 325 MG/1
650 TABLET ORAL ONCE
Status: COMPLETED | OUTPATIENT
Start: 2025-05-27 | End: 2025-05-27

## 2025-05-27 RX ADMIN — ACETAMINOPHEN 650 MG: 325 TABLET ORAL at 17:51

## 2025-05-27 ASSESSMENT — LIFESTYLE VARIABLES
HOW MANY STANDARD DRINKS CONTAINING ALCOHOL DO YOU HAVE ON A TYPICAL DAY: 1 OR 2
HOW OFTEN DO YOU HAVE A DRINK CONTAINING ALCOHOL: MONTHLY OR LESS

## 2025-05-27 NOTE — ED PROVIDER NOTES
left lower extremity.         XR FOOT LEFT (MIN 3 VIEWS)   Final Result   No acute osseous abnormality.      Dorsal and plantar calcaneal spurs.             ------------------------- NURSING NOTES AND VITALS REVIEWED ---------------------------   The nursing notes within the ED encounter and vital signs as below have been reviewed.   /71   Pulse 98   Temp 97.8 °F (36.6 °C)   Resp 16   SpO2 92%   Oxygen Saturation Interpretation: Normal      ---------------------------------------------------PHYSICAL EXAM--------------------------------------    Constitutional/General: Alert and oriented x3, well appearing, non toxic in NAD  Head: NC/AT  Eyes: PERRL, EOMI  Mouth: Oropharynx clear, handling secretions, no trismus  Neck: Supple, full ROM, no meningeal signs  Pulmonary: Lungs clear to auscultation bilaterally, no wheezes, rales, or rhonchi. Not in respiratory distress  Cardiovascular:  Regular rate and rhythm, no murmurs, gallops, or rubs. 2+ distal pulses  Abdomen: Soft, non tender, non distended,   Extremities: Moves all extremities x 4. Warm and well perfused left foot with generalized tenderness.  Pulse normal.  There is generalized erythema with scattered ecchymosis of the foot toes and ankle area.  Slight decreased sensation left third toe patient with full flexion extension of the toes present.  Compartment is soft.  There is generalized swelling of the left lower leg no calf tenderness elicited.  Minimal swelling of the right leg no erythema pulse normal   Skin: warm and dry without rash  Neurologic: GCS 15,  Psych: Normal Affect      ------------------------------ ED COURSE/MEDICAL DECISION MAKING----------------------  Medications   acetaminophen (TYLENOL) tablet 650 mg (650 mg Oral Given 5/27/25 5433)         Medical Decision Making    Patient presents to the ER for foot injury .   The historian that provided information patient  Social Determinants include   Social Connections: Not on file

## 2025-06-02 ENCOUNTER — OFFICE VISIT (OUTPATIENT)
Age: 80
End: 2025-06-02
Payer: COMMERCIAL

## 2025-06-02 VITALS
HEIGHT: 69 IN | DIASTOLIC BLOOD PRESSURE: 70 MMHG | SYSTOLIC BLOOD PRESSURE: 130 MMHG | WEIGHT: 247.6 LBS | BODY MASS INDEX: 36.67 KG/M2

## 2025-06-02 DIAGNOSIS — G63 VITAMIN B12 DEFICIENCY NEUROPATHY: Primary | ICD-10-CM

## 2025-06-02 DIAGNOSIS — G63 VITAMIN B12 DEFICIENCY NEUROPATHY: ICD-10-CM

## 2025-06-02 DIAGNOSIS — E53.8 VITAMIN B12 DEFICIENCY NEUROPATHY: Primary | ICD-10-CM

## 2025-06-02 DIAGNOSIS — M35.06 NEUROPATHY DUE TO SJOGREN'S SYNDROME: ICD-10-CM

## 2025-06-02 DIAGNOSIS — E53.8 VITAMIN B12 DEFICIENCY NEUROPATHY: ICD-10-CM

## 2025-06-02 DIAGNOSIS — M25.551 RIGHT HIP PAIN: ICD-10-CM

## 2025-06-02 DIAGNOSIS — M48.061 SPINAL STENOSIS OF LUMBAR REGION, UNSPECIFIED WHETHER NEUROGENIC CLAUDICATION PRESENT: ICD-10-CM

## 2025-06-02 DIAGNOSIS — M54.12 CERVICAL RADICULOPATHY AT C7: ICD-10-CM

## 2025-06-02 DIAGNOSIS — G63 NEUROPATHY DUE TO SJOGREN'S SYNDROME: ICD-10-CM

## 2025-06-02 PROCEDURE — 1123F ACP DISCUSS/DSCN MKR DOCD: CPT | Performed by: PSYCHIATRY & NEUROLOGY

## 2025-06-02 PROCEDURE — 1036F TOBACCO NON-USER: CPT | Performed by: PSYCHIATRY & NEUROLOGY

## 2025-06-02 PROCEDURE — G8427 DOCREV CUR MEDS BY ELIG CLIN: HCPCS | Performed by: PSYCHIATRY & NEUROLOGY

## 2025-06-02 PROCEDURE — G8400 PT W/DXA NO RESULTS DOC: HCPCS | Performed by: PSYCHIATRY & NEUROLOGY

## 2025-06-02 PROCEDURE — 1090F PRES/ABSN URINE INCON ASSESS: CPT | Performed by: PSYCHIATRY & NEUROLOGY

## 2025-06-02 PROCEDURE — 99214 OFFICE O/P EST MOD 30 MIN: CPT | Performed by: PSYCHIATRY & NEUROLOGY

## 2025-06-02 PROCEDURE — G8417 CALC BMI ABV UP PARAM F/U: HCPCS | Performed by: PSYCHIATRY & NEUROLOGY

## 2025-06-02 RX ORDER — GABAPENTIN 300 MG/1
300 CAPSULE ORAL 2 TIMES DAILY
Qty: 180 CAPSULE | Refills: 1 | Status: SHIPPED | OUTPATIENT
Start: 2025-06-02 | End: 2025-11-29

## 2025-06-02 NOTE — PROGRESS NOTES
Kyung Ellington MD       Helene W Schwab is a 80 y.o. female presenting as a follow patient for a   Chief Complaint   Patient presents with    Follow-up    Peripheral Neuropathy      Had the sural nerve biopsy.  Persistent numbness in right lateral foot.   Problems with driving         Chronicity: > 1 year  Onset: several months    Progression since onset: balance still an issue   Tried multiple courses of PT.   Not helping    Interim hx:   Iphone dropped in left foot- contusion.     Cannot lift her right hip up.  Right hip pain.         Emg:  Electrodiagnosis: Extensive electrodiagnostic examination of left upper and lower extremities reveals the following:     Predominantly chronic generalized sensorimotor peripheral neuropathy of the large fiber type, axon loss in type with secondary demyelination features, very severe in degree based on the degree of sensory and motor fiber loss in the lower extremities. It is mild in degree in the upper extremities.   There is evidence of a superimposed chronic left C7/8 motor radiculopathy, axon loss in type, at least moderate in degree electrically based on the degree of motor fiber loss.   Active on chronic motor axon loss changes in left AH and medial gastrocnemius muscles with chronic motor axon loss change in left short head of biceps femoris muscle most likely is secondary to above mentioned very severe peripheral neuropathy but a superimposed left active on chronic left S1 motor radiculopathy cannot be excluded.      Eeg: normal      Component      Latest Ref Rng 2/9/2024   Total Protein      6.4 - 8.3 g/dL 7.1    Albumin (calculated)      3.5 - 4.7 g/dL 3.6    Alpha-1-Globulin      0.2 - 0.4 g/dL 0.3    Alpha-2-Globulin      0.5 - 1.0 g/dL 0.7    Beta Globulin      0.8 - 1.3 g/dL 1.1    Gamma Globulin      0.7 - 1.6 g/dL 1.4    Protein Electrophoresis, Serum Normal serum protein electrophoresis. No monoclonal protein identified.    Pathologist Reviewed by:

## 2025-06-03 LAB
FOLATE: 17 NG/ML (ref 4.6–34.8)
VITAMIN B-12: 825 PG/ML (ref 232–1245)

## 2025-06-17 ENCOUNTER — RESULTS FOLLOW-UP (OUTPATIENT)
Age: 80
End: 2025-06-17

## 2025-08-25 ENCOUNTER — HOSPITAL ENCOUNTER (OUTPATIENT)
Dept: CT IMAGING | Age: 80
Discharge: HOME | End: 2025-08-25
Attending: PSYCHIATRY & NEUROLOGY
Payer: COMMERCIAL

## 2025-08-25 DIAGNOSIS — M48.061 SPINAL STENOSIS OF LUMBAR REGION, UNSPECIFIED WHETHER NEUROGENIC CLAUDICATION PRESENT: ICD-10-CM

## 2025-08-25 DIAGNOSIS — M54.12 CERVICAL RADICULOPATHY AT C7: ICD-10-CM

## 2025-08-25 PROCEDURE — 72125 CT NECK SPINE W/O DYE: CPT

## 2025-08-25 PROCEDURE — 72131 CT LUMBAR SPINE W/O DYE: CPT

## (undated) DEVICE — ELECTRODE PT RET AD L9FT HI MOIST COND ADH HYDRGEL CORDED

## (undated) DEVICE — BANDAGE,GAUZE,4.5"X4.1YD,STERILE,LF: Brand: MEDLINE

## (undated) DEVICE — BLADE,STAINLESS-STEEL,10,STRL,DISPOSABLE: Brand: MEDLINE

## (undated) DEVICE — STERILE POLYISOPRENE POWDER-FREE SURGICAL GLOVES: Brand: PROTEXIS

## (undated) DEVICE — BLADE,STAINLESS-STEEL,15,STRL,DISPOSABLE: Brand: MEDLINE

## (undated) DEVICE — SYRINGE MED 10ML TRNSLUC BRL PLUNG BLK MRK POLYPR CTRL

## (undated) DEVICE — UNIVERSAL DRAPE: Brand: MEDLINE INDUSTRIES, INC.

## (undated) DEVICE — STRAP POS MP 30X3 IN HK LOOP CLOSURE FOAM DISP

## (undated) DEVICE — NEEDLE HYPO 27GA L15IN REG BVL W O SFTY FOR SYR DISPOSABLE

## (undated) DEVICE — Device